# Patient Record
Sex: FEMALE | Race: WHITE | NOT HISPANIC OR LATINO | Employment: OTHER | URBAN - METROPOLITAN AREA
[De-identification: names, ages, dates, MRNs, and addresses within clinical notes are randomized per-mention and may not be internally consistent; named-entity substitution may affect disease eponyms.]

---

## 2017-04-04 ENCOUNTER — OFFICE VISIT (OUTPATIENT)
Dept: MEDICAL GROUP | Facility: PHYSICIAN GROUP | Age: 72
End: 2017-04-04
Payer: MEDICARE

## 2017-04-04 VITALS
TEMPERATURE: 97.7 F | DIASTOLIC BLOOD PRESSURE: 80 MMHG | OXYGEN SATURATION: 94 % | HEART RATE: 62 BPM | WEIGHT: 132 LBS | HEIGHT: 62 IN | RESPIRATION RATE: 18 BRPM | SYSTOLIC BLOOD PRESSURE: 140 MMHG | BODY MASS INDEX: 24.29 KG/M2

## 2017-04-04 DIAGNOSIS — E78.5 DYSLIPIDEMIA: ICD-10-CM

## 2017-04-04 DIAGNOSIS — F17.200 TOBACCO DEPENDENCE: ICD-10-CM

## 2017-04-04 DIAGNOSIS — R73.01 IMPAIRED FASTING BLOOD SUGAR: ICD-10-CM

## 2017-04-04 DIAGNOSIS — M81.0 OSTEOPOROSIS: ICD-10-CM

## 2017-04-04 DIAGNOSIS — E55.9 VITAMIN D DEFICIENCY: ICD-10-CM

## 2017-04-04 PROCEDURE — G8432 DEP SCR NOT DOC, RNG: HCPCS | Performed by: FAMILY MEDICINE

## 2017-04-04 PROCEDURE — 4004F PT TOBACCO SCREEN RCVD TLK: CPT | Performed by: FAMILY MEDICINE

## 2017-04-04 PROCEDURE — 4040F PNEUMOC VAC/ADMIN/RCVD: CPT | Performed by: FAMILY MEDICINE

## 2017-04-04 PROCEDURE — 99214 OFFICE O/P EST MOD 30 MIN: CPT | Performed by: FAMILY MEDICINE

## 2017-04-04 PROCEDURE — G8420 CALC BMI NORM PARAMETERS: HCPCS | Performed by: FAMILY MEDICINE

## 2017-04-04 PROCEDURE — 3014F SCREEN MAMMO DOC REV: CPT | Performed by: FAMILY MEDICINE

## 2017-04-04 PROCEDURE — 1101F PT FALLS ASSESS-DOCD LE1/YR: CPT | Performed by: FAMILY MEDICINE

## 2017-04-04 NOTE — PROGRESS NOTES
"Subjective:      Karla Prince is a 71 y.o. female who presents with Follow-Up            HPI     Patient returns for follow-up of her medical problems.    In terms of her dyslipidemia, she continues to manage this with her own efforts. The last blood work was in September 2016 with triglycerides of 360, HDL 50 and . She was supposed to have follow-up blood work before this visit but she was not able to get this done.    We also follow her for impaired fasting blood sugar and she manages this by watching her diet. The last blood work came back normal fasting blood sugar but her hemoglobin A1c was 5.8 in March 2016.    For her osteoporosis, she takes calcium and vitamin D supplementation only. She refused biphosphonates because of concern for side effects. Her last bone density scan was in 2016.    As mentioned above she takes vitamin D supplementation 2000 international units daily for osteoporosis as well as for vitamin D deficiency. Last vitamin D level was 27 in December 2016.    She continues to smoke about quarter of a pack per day.    Past medical history, past surgical history, family history reviewed-no changes    Social history reviewed-no changes    Allergies reviewed-no changes    Medications reviewed-no changes    ROS     Review of systems as per history of present illness, the rest are negative.       Objective:     /80 mmHg  Pulse 62  Temp(Src) 36.5 °C (97.7 °F)  Resp 18  Ht 1.575 m (5' 2.01\")  Wt 59.875 kg (132 lb)  BMI 24.14 kg/m2  SpO2 94%     Physical Exam     Examined alert, awake, oriented, not in distress    Neck-supple, no lymphadenopathy, no thyromegaly  Lungs-clear to auscultation, no rales, no wheezes  Heart-regular rate and rhythm, no murmur  Extremities-no edema, clubbing, cyanosis            Assessment/Plan:     1. Dyslipidemia  She will do follow-up blood work as previously ordered. She was made aware that smoking cigarettes is a risk factor for CAD. We will calculate " her 10 year cardiovascular disease after we get her lab results and consider statin depending on her risk. Was advised to stop cigarette use to decrease her cardiovascular disease risk.    2. Impaired fasting blood sugar  Advised to watch her diet in terms of carbohydrates and sweets, regular exercises. We will do blood work as previously ordered to follow-up on the blood sugar as well as hemoglobin A1c.    3. Osteoporosis  She continues to refuse biphosphonates. She will continue calcium and vitamin D supplementation. We will check vitamin D level.    4. Vitamin D deficiency  Continue vitamin D supplementation and we will check vitamin D level.    5. Tobacco dependence  Discussed the need to stop cigarette use because of increased risk for cardiovascular disease.     6. Need for Tdap vaccination  Tdap was given.  - TDAP VACCINE =>8YO IM      Please note that this dictation was created using voice recognition software. I have worked with consultants from the vendor as well as technical experts from [facility]  [employer] to optimize the interface. I have made every reasonable attempt to correct obvious errors, but I expect that there are errors of grammar and possibly content I did not discover before finalizing the note.

## 2017-04-04 NOTE — MR AVS SNAPSHOT
"        Karla Prince   2017 1:40 PM   Office Visit   MRN: [id number]    Department:  [facility]   Dept Phone:  [phone]    Description:  Female : 1945   Provider:  Yas Phelps M.D.           Reason for Visit     Follow-Up 6 month follow up       Allergies as of 2017     No Known Allergies      You were diagnosed with     Dyslipidemia   [871077]       Impaired fasting blood sugar   [637371]       Osteoporosis   [6177752]       Vitamin D deficiency   [8192690]       Tobacco dependence   [033428]       Need for Tdap vaccination   [542042]         Vital Signs     Blood Pressure Pulse Temperature Respirations Height Weight    140/80 mmHg 62 36.5 °C (97.7 °F) 18 1.575 m (5' 2.01\") 59.875 kg (132 lb)    Body Mass Index Oxygen Saturation Smoking Status             24.14 kg/m2 94% Current Every Day Smoker         Basic Information     Date Of Birth Sex Race Ethnicity Preferred Language    1945 Female White Non- English      Your appointments     Oct 10, 2017 10:00 AM   Established Patient with Yas Phelps M.D.   [facility] (--)    [address]  [address]  [facility] [id number] [phone]           You will be receiving a confirmation call a few days before your appointment from our automated call confirmation system.              Problem List              ICD-10-CM Priority Class Noted - Resolved    Osteoporosis M81.0   3/4/2016 - Present    Dyslipidemia E78.5   2016 - Present    Impaired fasting blood sugar R73.01   2016 - Present      Health Maintenance        Date Due Completion Dates    IMM DTaP/Tdap/Td Vaccine (1 - Tdap) 1966 ---    PAP SMEAR 1968 ---    COLONOSCOPY 1997 ---    IMM PNEUMOCOCCAL 65+ (ADULT) LOW/MEDIUM RISK SERIES (2 of 2 - PPSV23) 2017, 10/10/2011    MAMMOGRAM 3/3/2017 3/3/2016, 2016    BONE DENSITY 3/3/2021 3/3/2016            Current Immunizations     13-VALENT PCV PREVNAR 2016    Influenza " Vaccine Adult HD 10/3/2016, 10/12/2015    Pneumococcal polysaccharide vaccine (PPSV-23) 10/10/2011    SHINGLES VACCINE 10/12/2009    Tdap Vaccine  Incomplete      Below and/or attached are the medications your provider expects you to take. Review all of your home medications and newly ordered medications with your provider and/or pharmacist. Follow medication instructions as directed by your provider and/or pharmacist. Please keep your medication list with you and share with your provider. Update the information when medications are discontinued, doses are changed, or new medications (including over-the-counter products) are added; and carry medication information at all times in the event of emergency situations     Allergies:  No Known Allergies          Medications  Valid as of: April 04, 2017 -  2:03 PM    Generic Name Brand Name Tablet Size Instructions for use    Acetaminophen   Take  by mouth.        Alendronate Sodium (Tab) FOSAMAX 70 MG Take 1 Tab by mouth every 7 days.        Aspirin (Chew Tab) ASA 81 MG Take 81 mg by mouth every day.        Calcium Citrate   Take  by mouth.        Cholecalciferol (Tab) cholecalciferol 1000 UNIT Take 1,000 Units by mouth every day.        Clobetasol Propionate (Cream) TEMOVATE 0.05 % Apply  to affected area(s) 2 times a day.        Phenazopyridine HCl (Tab) PYRIDIUM 200 MG Take 1 Tab by mouth 3 times a day as needed.        .                 Medicines prescribed today were sent to:     [facility]  [place], [place] - [address]    [address] [facility] [address]    Phone: [phone] Fax: [phone]    Open 24 Hours?: No      Medication refill instructions:       If your prescription bottle indicates you have medication refills left, it is not necessary to call your provider’s office. Please contact your pharmacy and they will refill your medication.    If your prescription bottle indicates you do not have any refills left, you may request refills at any time  through one of the following ways: The online Lezu365 system (except Urgent Care), by calling your provider’s office, or by asking your pharmacy to contact your provider’s office with a refill request. Medication refills are processed only during regular business hours and may not be available until the next business day. Your provider may request additional information or to have a follow-up visit with you prior to refilling your medication.   *Please Note: Medication refills are assigned a new Rx number when refilled electronically. Your pharmacy may indicate that no refills were authorized even though a new prescription for the same medication is available at the pharmacy. Please request the medicine by name with the pharmacy before contacting your provider for a refill.           Lezu365 Access Code: [email or web address]  Expires: 5/4/2017  1:03 PM    Lezu365  A secure, online tool to manage your health information     [employer]’s Lezu365® is a secure, online tool that connects you to your personalized health information from the privacy of your home -- day or night - making it very easy for you to manage your healthcare. Once the activation process is completed, you can even access your medical information using the Lezu365 cherelle, which is available for free in the Apple Cherelle store or Google Play store.     Lezu365 provides the following levels of access (as shown below):   My Chart Features   [facility] Doctor [facility]  Urgent  Care Non-[facility]  Primary Care  Doctor   Email your healthcare team securely and privately 24/7 X X X    Manage appointments: schedule your next appointment; view details of past/upcoming appointments X      Request prescription refills. X      View recent personal medical records, including lab and immunizations X X X X   View health record, including health history, allergies, medications X X X X   Read reports about your outpatient visits, procedures, consult  and ER notes X X X X   See your discharge summary, which is a recap of your hospital and/or ER visit that includes your diagnosis, lab results, and care plan. X X       How to register for Onyu:  1. Go to  [email or web address].  2. Click on the Sign Up Now box, which takes you to the New Member Sign Up page. You will need to provide the following information:  a. Enter your Onyu Access Code exactly as it appears at the top of this page. (You will not need to use this code after you’ve completed the sign-up process. If you do not sign up before the expiration date, you must request a new code.)   b. Enter your date of birth.   c. Enter your home email address.   d. Click Submit, and follow the next screen’s instructions.  3. Create a Onyu ID. This will be your Onyu login ID and cannot be changed, so think of one that is secure and easy to remember.  4. Create a Onyu password. You can change your password at any time.  5. Enter your Password Reset Question and Answer. This can be used at a later time if you forget your password.   6. Enter your e-mail address. This allows you to receive e-mail notifications when new information is available in Onyu.  7. Click Sign Up. You can now view your health information.    For assistance activating your Onyu account, call [phone]        Quit Tobacco Information     Do you want to quit using tobacco?    Quitting tobacco decreases risks of cancer, heart and lung disease, increases life expectancy, improves sense of taste and smell, and increases spending money, among other benefits.    If you are thinking about quitting, we can help.  • [employer] Quit Tobacco Program: 982.355.2162  o Program occurs weekly for four weeks and includes pharmacist consultation on products to support quitting smoking or chewing tobacco. A provider referral is needed for pharmacist consultation.  • Tobacco Users Help Hotline: 4-058-QUIT-NOW (552-3256) or  https://[place].quitlogix.org/  o Free, confidential telephone and online coaching for [place] residents. Sessions are designed on a schedule that is convenient for you. Eligible clients receive free nicotine replacement therapy.  • Nationally: www.smokefree.gov  o Information and professional assistance to support both immediate and long-term needs as you become, and remain, a non-smoker. Smokefree.gov allows you to choose the help that best fits your needs.

## 2017-04-14 ENCOUNTER — HOSPITAL ENCOUNTER (OUTPATIENT)
Dept: LAB | Facility: MEDICAL CENTER | Age: 72
End: 2017-04-14
Attending: FAMILY MEDICINE
Payer: MEDICARE

## 2017-04-14 DIAGNOSIS — M81.0 OSTEOPOROSIS: ICD-10-CM

## 2017-04-14 DIAGNOSIS — R73.01 IMPAIRED FASTING BLOOD SUGAR: ICD-10-CM

## 2017-04-14 DIAGNOSIS — E78.5 DYSLIPIDEMIA: ICD-10-CM

## 2017-04-14 DIAGNOSIS — E55.9 VITAMIN D INSUFFICIENCY: ICD-10-CM

## 2017-04-14 DIAGNOSIS — R03.0 ELEVATED BLOOD PRESSURE READING: ICD-10-CM

## 2017-04-14 LAB
25(OH)D3 SERPL-MCNC: 23 NG/ML (ref 30–100)
ALBUMIN SERPL BCP-MCNC: 4 G/DL (ref 3.2–4.9)
ALBUMIN/GLOB SERPL: 1.5 G/DL
ALP SERPL-CCNC: 56 U/L (ref 30–99)
ALT SERPL-CCNC: 16 U/L (ref 2–50)
ANION GAP SERPL CALC-SCNC: 6 MMOL/L (ref 0–11.9)
AST SERPL-CCNC: 18 U/L (ref 12–45)
BASOPHILS # BLD AUTO: 0.8 % (ref 0–1.8)
BASOPHILS # BLD: 0.06 K/UL (ref 0–0.12)
BILIRUB SERPL-MCNC: 0.6 MG/DL (ref 0.1–1.5)
BUN SERPL-MCNC: 16 MG/DL (ref 8–22)
CALCIUM SERPL-MCNC: 9.1 MG/DL (ref 8.5–10.5)
CHLORIDE SERPL-SCNC: 107 MMOL/L (ref 96–112)
CHOLEST SERPL-MCNC: 202 MG/DL (ref 100–199)
CO2 SERPL-SCNC: 27 MMOL/L (ref 20–33)
CREAT SERPL-MCNC: 0.77 MG/DL (ref 0.5–1.4)
EOSINOPHIL # BLD AUTO: 0.11 K/UL (ref 0–0.51)
EOSINOPHIL NFR BLD: 1.5 % (ref 0–6.9)
ERYTHROCYTE [DISTWIDTH] IN BLOOD BY AUTOMATED COUNT: 44.1 FL (ref 35.9–50)
GFR SERPL CREATININE-BSD FRML MDRD: >60 ML/MIN/1.73 M 2
GLOBULIN SER CALC-MCNC: 2.7 G/DL (ref 1.9–3.5)
GLUCOSE SERPL-MCNC: 92 MG/DL (ref 65–99)
HCT VFR BLD AUTO: 43.9 % (ref 37–47)
HDLC SERPL-MCNC: 55 MG/DL
HGB BLD-MCNC: 14.1 G/DL (ref 12–16)
IMM GRANULOCYTES # BLD AUTO: 0.02 K/UL (ref 0–0.11)
IMM GRANULOCYTES NFR BLD AUTO: 0.3 % (ref 0–0.9)
LDLC SERPL CALC-MCNC: 95 MG/DL
LYMPHOCYTES # BLD AUTO: 2.1 K/UL (ref 1–4.8)
LYMPHOCYTES NFR BLD: 27.7 % (ref 22–41)
MCH RBC QN AUTO: 31.5 PG (ref 27–33)
MCHC RBC AUTO-ENTMCNC: 32.1 G/DL (ref 33.6–35)
MCV RBC AUTO: 98 FL (ref 81.4–97.8)
MONOCYTES # BLD AUTO: 0.52 K/UL (ref 0–0.85)
MONOCYTES NFR BLD AUTO: 6.9 % (ref 0–13.4)
NEUTROPHILS # BLD AUTO: 4.77 K/UL (ref 2–7.15)
NEUTROPHILS NFR BLD: 62.8 % (ref 44–72)
NRBC # BLD AUTO: 0 K/UL
NRBC BLD AUTO-RTO: 0 /100 WBC
PLATELET # BLD AUTO: 210 K/UL (ref 164–446)
PMV BLD AUTO: 12.1 FL (ref 9–12.9)
POTASSIUM SERPL-SCNC: 3.9 MMOL/L (ref 3.6–5.5)
PROT SERPL-MCNC: 6.7 G/DL (ref 6–8.2)
RBC # BLD AUTO: 4.48 M/UL (ref 4.2–5.4)
SODIUM SERPL-SCNC: 140 MMOL/L (ref 135–145)
TRIGL SERPL-MCNC: 258 MG/DL (ref 0–149)
WBC # BLD AUTO: 7.6 K/UL (ref 4.8–10.8)

## 2017-04-14 PROCEDURE — 82306 VITAMIN D 25 HYDROXY: CPT

## 2017-04-14 PROCEDURE — 80061 LIPID PANEL: CPT

## 2017-04-14 PROCEDURE — 85025 COMPLETE CBC W/AUTO DIFF WBC: CPT

## 2017-04-14 PROCEDURE — 80053 COMPREHEN METABOLIC PANEL: CPT

## 2017-04-14 PROCEDURE — 36415 COLL VENOUS BLD VENIPUNCTURE: CPT

## 2017-04-16 DIAGNOSIS — E55.9 VITAMIN D DEFICIENCY: ICD-10-CM

## 2017-04-16 DIAGNOSIS — R73.01 IMPAIRED FASTING BLOOD SUGAR: ICD-10-CM

## 2017-04-16 DIAGNOSIS — E78.5 DYSLIPIDEMIA: ICD-10-CM

## 2017-04-17 ENCOUNTER — TELEPHONE (OUTPATIENT)
Dept: MEDICAL GROUP | Facility: PHYSICIAN GROUP | Age: 72
End: 2017-04-17

## 2017-04-17 NOTE — TELEPHONE ENCOUNTER
1. Caller Name: Karla Prince                      Call Back Number: [phone] (home)     2. Message: Unable to reach pt left vm to call back.     3. Patient approves office to leave a detailed voicemail/MyChart message: N\A

## 2017-04-17 NOTE — TELEPHONE ENCOUNTER
----- Message from Yas Phelps M.D. sent at 4/16/2017  5:06 PM PDT -----  Cholesterol panel came back triglycerides went down from 360-258. We still need them below 150. Continue to watch the sweets and carbohydrates. Exercise regularly.  HDL or good cholesterol is still very good at 55. This needs to be 40 or higher.  LDL or bad cholesterol improved and decreased from 118-95. We need to keep this below 100. Continue to avoid fatty foods, eat more fruits and vegetables and fish.  No diabetes. Liver and kidney functions are normal.  Vitamin D decreased from 27-23. Normal range is . Add 1000 international units of vitamin D to current dose.  No anemia.  We will do follow-up few days before her visit in October. This is a fasting blood work. Orders are in the computer.

## 2017-04-18 NOTE — TELEPHONE ENCOUNTER
1. Caller Name: Karla Prince                      Call Back Number: [phone] (home)     2. Message: Pt notified of the results below.     3. Patient approves office to leave a detailed voicemail/MyChart message: N\A

## 2017-06-13 ENCOUNTER — OFFICE VISIT (OUTPATIENT)
Dept: URGENT CARE | Facility: PHYSICIAN GROUP | Age: 72
End: 2017-06-13
Payer: MEDICARE

## 2017-06-13 VITALS
HEIGHT: 62 IN | DIASTOLIC BLOOD PRESSURE: 72 MMHG | BODY MASS INDEX: 24.29 KG/M2 | TEMPERATURE: 98.3 F | WEIGHT: 132 LBS | OXYGEN SATURATION: 98 % | HEART RATE: 90 BPM | SYSTOLIC BLOOD PRESSURE: 128 MMHG

## 2017-06-13 DIAGNOSIS — H65.06 RECURRENT ACUTE SEROUS OTITIS MEDIA OF BOTH EARS: ICD-10-CM

## 2017-06-13 DIAGNOSIS — Z72.0 TOBACCO ABUSE: ICD-10-CM

## 2017-06-13 PROCEDURE — 99213 OFFICE O/P EST LOW 20 MIN: CPT | Performed by: EMERGENCY MEDICINE

## 2017-06-13 ASSESSMENT — ENCOUNTER SYMPTOMS
HEADACHES: 0
FEVER: 0
VERTIGO: 0
SEIZURES: 0
NERVOUS/ANXIOUS: 0
EYE REDNESS: 0
FOCAL WEAKNESS: 0
SORE THROAT: 0
NAUSEA: 1
INSOMNIA: 0
MEMORY LOSS: 0
COUGH: 0
CHILLS: 0
EYE DISCHARGE: 0
ABDOMINAL PAIN: 0
VOMITING: 0
NECK PAIN: 0

## 2017-06-13 NOTE — MR AVS SNAPSHOT
"        Karla Prince   2017 12:55 PM   Office Visit   MRN: [id number]    Department:  [facility]   Dept Phone:  [phone]    Description:  Female : 1945   Provider:  GALILEO Hodge M.D.           Reason for Visit     Ear Fullness bilat ear fullness x 5 days      Allergies as of 2017     No Known Allergies      You were diagnosed with     Tobacco abuse   [570393]       Recurrent acute serous otitis media of both ears   [768419]         Vital Signs     Blood Pressure Pulse Temperature Height Weight Body Mass Index    128/72 mmHg 90 36.8 °C (98.3 °F) 1.575 m (5' 2\") 59.875 kg (132 lb) 24.14 kg/m2    Oxygen Saturation Smoking Status                98% Current Every Day Smoker          Basic Information     Date Of Birth Sex Race Ethnicity Preferred Language    1945 Female White Non- English      Your appointments     Oct 10, 2017 10:00 AM   Established Patient with Yas Phelps M.D.   [facility] (--)    [address]  [address]  [facility] [id number] [phone]           You will be receiving a confirmation call a few days before your appointment from our automated call confirmation system.              Problem List              ICD-10-CM Priority Class Noted - Resolved    Osteoporosis M81.0   3/4/2016 - Present    Dyslipidemia E78.5   2016 - Present    Impaired fasting blood sugar R73.01   2016 - Present      Health Maintenance        Date Due Completion Dates    PAP SMEAR 1968 ---    COLONOSCOPY 1997 ---    IMM PNEUMOCOCCAL 65+ (ADULT) LOW/MEDIUM RISK SERIES (2 of 2 - PPSV23) 2017, 10/10/2011    MAMMOGRAM 3/3/2017 3/3/2016    BONE DENSITY 3/3/2021 3/3/2016    IMM DTaP/Tdap/Td Vaccine (2 - Td) 10/17/2026 10/17/2016            Current Immunizations     13-VALENT PCV PREVNAR 2016    Influenza Vaccine Adult HD 10/17/2016, 10/3/2016, 10/12/2015    Pneumococcal polysaccharide vaccine (PPSV-23) 10/10/2011    SHINGLES VACCINE " 10/12/2009    Tdap Vaccine 10/17/2016      Below and/or attached are the medications your provider expects you to take. Review all of your home medications and newly ordered medications with your provider and/or pharmacist. Follow medication instructions as directed by your provider and/or pharmacist. Please keep your medication list with you and share with your provider. Update the information when medications are discontinued, doses are changed, or new medications (including over-the-counter products) are added; and carry medication information at all times in the event of emergency situations     Allergies:  No Known Allergies          Medications  Valid as of: June 13, 2017 -  6:47 PM    Generic Name Brand Name Tablet Size Instructions for use    Acetaminophen   Take  by mouth.        Alendronate Sodium (Tab) FOSAMAX 70 MG Take 1 Tab by mouth every 7 days.        Aspirin (Chew Tab) ASA 81 MG Take 81 mg by mouth every day.        Calcium Citrate   Take  by mouth.        Cholecalciferol (Tab) cholecalciferol 1000 UNIT Take 1,000 Units by mouth every day.        Clobetasol Propionate (Cream) TEMOVATE 0.05 % Apply  to affected area(s) 2 times a day.        Phenazopyridine HCl (Tab) PYRIDIUM 200 MG Take 1 Tab by mouth 3 times a day as needed.        .                 Medicines prescribed today were sent to:     [facility] - [place], [place] - [address]    [facility] [address]    Phone: [phone] Fax: [phone]    Open 24 Hours?: No      Medication refill instructions:       If your prescription bottle indicates you have medication refills left, it is not necessary to call your provider’s office. Please contact your pharmacy and they will refill your medication.    If your prescription bottle indicates you do not have any refills left, you may request refills at any time through one of the following ways: The online Orecon system (except Urgent Care), by calling your provider’s office, or by  asking your pharmacy to contact your provider’s office with a refill request. Medication refills are processed only during regular business hours and may not be available until the next business day. Your provider may request additional information or to have a follow-up visit with you prior to refilling your medication.   *Please Note: Medication refills are assigned a new Rx number when refilled electronically. Your pharmacy may indicate that no refills were authorized even though a new prescription for the same medication is available at the pharmacy. Please request the medicine by name with the pharmacy before contacting your provider for a refill.           BestBoy Keyboard Access Code: [email or web address]  Expires: 7/13/2017  6:47 PM    BestBoy Keyboard  A secure, online tool to manage your health information     [employer]’s BestBoy Keyboard® is a secure, online tool that connects you to your personalized health information from the privacy of your home -- day or night - making it very easy for you to manage your healthcare. Once the activation process is completed, you can even access your medical information using the BestBoy Keyboard cherelle, which is available for free in the Apple Cherelle store or Google Play store.     BestBoy Keyboard provides the following levels of access (as shown below):   My Chart Features   [facility] Doctor [facility]  Urgent  Care Non-[facility]  Primary Care  Doctor   Email your healthcare team securely and privately 24/7 X X X    Manage appointments: schedule your next appointment; view details of past/upcoming appointments X      Request prescription refills. X      View recent personal medical records, including lab and immunizations X X X X   View health record, including health history, allergies, medications X X X X   Read reports about your outpatient visits, procedures, consult and ER notes X X X X   See your discharge summary, which is a recap of your hospital and/or ER visit that includes your  diagnosis, lab results, and care plan. X X       How to register for Bunndle:  1. Go to  [email or web address].  2. Click on the Sign Up Now box, which takes you to the New Member Sign Up page. You will need to provide the following information:  a. Enter your Bunndle Access Code exactly as it appears at the top of this page. (You will not need to use this code after you’ve completed the sign-up process. If you do not sign up before the expiration date, you must request a new code.)   b. Enter your date of birth.   c. Enter your home email address.   d. Click Submit, and follow the next screen’s instructions.  3. Create a Bunndle ID. This will be your Bunndle login ID and cannot be changed, so think of one that is secure and easy to remember.  4. Create a FarmLinkt password. You can change your password at any time.  5. Enter your Password Reset Question and Answer. This can be used at a later time if you forget your password.   6. Enter your e-mail address. This allows you to receive e-mail notifications when new information is available in Bunndle.  7. Click Sign Up. You can now view your health information.    For assistance activating your Bunndle account, call [phone]        Quit Tobacco Information     Do you want to quit using tobacco?    Quitting tobacco decreases risks of cancer, heart and lung disease, increases life expectancy, improves sense of taste and smell, and increases spending money, among other benefits.    If you are thinking about quitting, we can help.  • [facility] [place] [facility] Quit Tobacco Program: 243.508.1915  o Program occurs weekly for four weeks and includes pharmacist consultation on products to support quitting smoking or chewing tobacco. A provider referral is needed for pharmacist consultation.  • Tobacco Users Help Hotline: 6-509-QUIT-NOW (069-5500) or https://[place].quitlogix.org/  o Free, confidential telephone and online coaching for [place] residents. Sessions are designed on a  schedule that is convenient for you. Eligible clients receive free nicotine replacement therapy.  • Nationally: www.smokefree.gov  o Information and professional assistance to support both immediate and long-term needs as you become, and remain, a non-smoker. Smokefree.gov allows you to choose the help that best fits your needs.

## 2017-06-13 NOTE — PROGRESS NOTES
Subjective:      Karla Prince is a 71 y.o. female who presents with Ear Fullness            Ear Fullness  This is a new problem. The current episode started in the past 7 days. The problem occurs constantly. The problem has been waxing and waning. Associated symptoms include congestion and nausea. Pertinent negatives include no abdominal pain, chest pain, chills, coughing, fever, headaches (headaches have resolved were present for 1-2 days.), neck pain, rash, sore throat, vertigo or vomiting.   No Known Allergies   Social History     Social History   • Marital Status:      Spouse Name: N/A   • Number of Children: 1   • Years of Education: N/A     Occupational History   • retired - front office neurosurgeon's office      Social History Main Topics   • Smoking status: Current Every Day Smoker -- 0.25 packs/day for 52 years     Types: Cigarettes   • Smokeless tobacco: Never Used      Comment: dec from 1/2 ppd to 1/4 ppd since 2/16   • Alcohol Use: 0.0 oz/week     0 Standard drinks or equivalent per week      Comment: glass of wine with dinner   • Drug Use: No   • Sexual Activity:     Partners: Male     Other Topics Concern   • Not on file     Social History Narrative     Past Medical History   Diagnosis Date   • Osteoporosis 3/16     DEXA scan-3/16   • Osteoporosis    • Dyslipidemia    • Impaired fasting blood sugar      Current Outpatient Prescriptions on File Prior to Visit   Medication Sig Dispense Refill   • vitamin D (CHOLECALCIFEROL) 1000 UNIT Tab Take 1,000 Units by mouth every day.     • alendronate (FOSAMAX) 70 MG Tab Take 1 Tab by mouth every 7 days. 12 Tab 3   • clobetasol (TEMOVATE) 0.05 % Cream Apply  to affected area(s) 2 times a day.     • phenazopyridine (PYRIDIUM) 200 MG TABS Take 1 Tab by mouth 3 times a day as needed. 6 Tab 0   • Calcium Citrate (CITRACAL PO) Take  by mouth.     • aspirin (ASA) 81 MG CHEW chewable tablet Take 81 mg by mouth every day.     • Acetaminophen (TYLENOL PO) Take  by  "mouth.       No current facility-administered medications on file prior to visit.     Family History   Problem Relation Age of Onset   • Dementia Mother    • Stroke Mother    • Diabetes Father    • Hyperlipidemia Sister    • Hyperlipidemia Brother        Review of Systems   Constitutional: Negative for fever and chills.   HENT: Positive for congestion and ear pain. Negative for ear discharge and sore throat.    Eyes: Negative for discharge and redness.   Respiratory: Negative for cough.    Cardiovascular: Negative for chest pain.   Gastrointestinal: Positive for nausea. Negative for vomiting and abdominal pain.   Musculoskeletal: Negative for neck pain.   Skin: Negative for rash.   Neurological: Negative for vertigo, focal weakness, seizures and headaches (headaches have resolved were present for 1-2 days.).   Psychiatric/Behavioral: Negative for memory loss. The patient is not nervous/anxious and does not have insomnia.           Objective:     /72 mmHg  Pulse 90  Temp(Src) 36.8 °C (98.3 °F)  Ht 1.575 m (5' 2\")  Wt 59.875 kg (132 lb)  BMI 24.14 kg/m2  SpO2 98%     Physical Exam   Constitutional: She appears well-developed and well-nourished. No distress.   HENT:   Head: Normocephalic and atraumatic.   Right Ear: External ear normal.   Left Ear: External ear normal.   TMs with clear fluid behind her TMs, no erythema no dullness canals clear. Patient's pharynx is benign without any exudates.   Eyes: Conjunctivae are normal. Right eye exhibits no discharge. Left eye exhibits no discharge.   Neck: Normal range of motion.   Cardiovascular: Normal rate and regular rhythm.    Pulmonary/Chest: Effort normal and breath sounds normal. No stridor.   Abdominal: She exhibits no distension. There is no tenderness.   Musculoskeletal: Normal range of motion.   Neurological: She is alert.   Skin: Skin is warm and dry. She is not diaphoretic. No erythema.   Psychiatric: She has a normal mood and affect.   Vitals " reviewed.              Assessment/Plan:     DX Otalgia (Serous Otitis)          Tobacco abuse      I am recommending the patient initiate/ continue hydration efforts including the use of a vaporizer/humidifier/ netti pot. I also recommend the pt, initiate Mucinex D, Sudafed during the day. If the patient's condition exacerbates with worsening dysphagia, shortness of breath, uncontrolled fever, headache or chest pressure he/she will return immediately to the urgent care or go to  the emergency department for further evaluation.    I recommended the patient cut back on her smoking    GALILEO Hodge

## 2017-07-19 ENCOUNTER — TELEPHONE (OUTPATIENT)
Dept: MEDICAL GROUP | Facility: PHYSICIAN GROUP | Age: 72
End: 2017-07-19

## 2017-07-19 DIAGNOSIS — H65.93 MIDDLE EAR EFFUSION, BILATERAL: ICD-10-CM

## 2017-07-19 NOTE — TELEPHONE ENCOUNTER
Patient called stating she went to  for plugged ears and they suggested Sudafed however this has not been effective.  She is requesting an ENT referral.

## 2017-08-14 ENCOUNTER — HOSPITAL ENCOUNTER (EMERGENCY)
Facility: MEDICAL CENTER | Age: 72
End: 2017-08-14
Attending: EMERGENCY MEDICINE
Payer: MEDICARE

## 2017-08-14 ENCOUNTER — APPOINTMENT (OUTPATIENT)
Dept: RADIOLOGY | Facility: MEDICAL CENTER | Age: 72
End: 2017-08-14
Attending: EMERGENCY MEDICINE
Payer: MEDICARE

## 2017-08-14 VITALS
RESPIRATION RATE: 18 BRPM | TEMPERATURE: 97.9 F | OXYGEN SATURATION: 92 % | WEIGHT: 133.6 LBS | SYSTOLIC BLOOD PRESSURE: 175 MMHG | BODY MASS INDEX: 24.59 KG/M2 | HEIGHT: 62 IN | HEART RATE: 63 BPM | DIASTOLIC BLOOD PRESSURE: 75 MMHG

## 2017-08-14 DIAGNOSIS — M62.830 SPASM OF LUMBAR PARASPINOUS MUSCLE: ICD-10-CM

## 2017-08-14 PROCEDURE — 99283 EMERGENCY DEPT VISIT LOW MDM: CPT

## 2017-08-14 PROCEDURE — 74176 CT ABD & PELVIS W/O CONTRAST: CPT

## 2017-08-14 RX ORDER — CYCLOBENZAPRINE HCL 10 MG
10 TABLET ORAL 3 TIMES DAILY PRN
Qty: 20 TAB | Refills: 0 | Status: SHIPPED | OUTPATIENT
Start: 2017-08-14 | End: 2018-04-12

## 2017-08-14 RX ORDER — IBUPROFEN 600 MG/1
600 TABLET ORAL EVERY 6 HOURS PRN
Qty: 30 TAB | Refills: 0 | Status: SHIPPED | OUTPATIENT
Start: 2017-08-14 | End: 2018-06-13

## 2017-08-14 RX ORDER — CYCLOBENZAPRINE HCL 10 MG
10 TABLET ORAL 3 TIMES DAILY PRN
Qty: 20 TAB | Refills: 0 | Status: SHIPPED | OUTPATIENT
Start: 2017-08-14 | End: 2017-08-14

## 2017-08-14 ASSESSMENT — PAIN SCALES - GENERAL: PAINLEVEL_OUTOF10: 3

## 2017-08-14 NOTE — ED NOTES
"Per er tech, pt amb to er with family with c/o left sided back pain \"few weeks \" with radiation to left side and buttock. Denies dysuria of difficulty with urination, states fell in \"snow hole\" in April and now family states \"bulge\" to area of pain.  "

## 2017-08-14 NOTE — ED NOTES
"Chief Complaint   Patient presents with   • Back Pain     Pt BIB family, c/o left sided back pain that radiate down to lower back, buttock region. Pt states that family noticed \"a buldge\" where back pain is. Pt denies dsyuria and trauma.      /80 mmHg  Pulse 85  Temp(Src) 36.6 °C (97.9 °F)  Resp 18  Ht 1.575 m (5' 2\")  Wt 60.6 kg (133 lb 9.6 oz)  BMI 24.43 kg/m2  SpO2 92%    "

## 2017-08-14 NOTE — ED AVS SNAPSHOT
8/14/2017    Karla Prince  [address] [address]  [facility] [address]    Dear Karla:    [facility] wants to ensure your discharge home is safe and you or your loved ones have had all of your questions answered regarding your care after you leave the hospital.    Below is a list of resources and contact information should you have any questions regarding your hospital stay, follow-up instructions, or active medical symptoms.    Questions or Concerns Regarding… Contact   Medical Questions Related to Your Discharge  (7 days a week, 8am-5pm) Contact a Nurse Care Coordinator   [phone]   Medical Questions Not Related to Your Discharge  (24 hours a day / 7 days a week)  Contact the Nurse Health Line   [phone]    Medications or Discharge Instructions Refer to your discharge packet   or contact your [facility] Primary Care Provider   [phone]   Follow-up Appointment(s) Schedule your appointment via [employer]   or contact Scheduling [phone]   Billing Review your statement via [employer]  or contact Billing [phone]   Medical Records Review your records via [employer]   or contact Medical Records [phone]     You may receive a telephone call within two days of discharge. This call is to make certain you understand your discharge instructions and have the opportunity to have any questions answered. You can also easily access your medical information, test results and upcoming appointments via the [employer] free online health management tool. You can learn more and sign up at [employer]. For assistance setting up your [employer] account, please call [phone].    Once again, we want to ensure your discharge home is safe and that you have a clear understanding of any next steps in your care. If you have any questions or concerns, please do not hesitate to contact us, we are here for you. Thank you for choosing [facility] for your healthcare needs.    Sincerely,    Your [facility] Healthcare Team

## 2017-08-14 NOTE — ED AVS SNAPSHOT
Home Care Instructions                                                                                                                Karla Prince   MRN: [id number]    Department:  [facility], Emergency Dept   Date of Visit:  8/14/2017            [facility], Emergency Dept    [address]    [place] [address]    Phone:  [phone]      You were seen by     Stefan Concepcion M.D.      Your Diagnosis Was     Spasm of lumbar paraspinous muscle     M62.830       Follow-up Information     1. Follow up with Yas Phelps M.D. In 1 day.    Specialty:  Family Medicine    Contact information    [address]  [place] [address] [phone]        Medication Information     Review all of your home medications and newly ordered medications with your primary doctor and/or pharmacist as soon as possible. Follow medication instructions as directed by your doctor and/or pharmacist.     Please keep your complete medication list with you and share with your physician. Update the information when medications are discontinued, doses are changed, or new medications (including over-the-counter products) are added; and carry medication information at all times in the event of emergency situations.               Medication List      START taking these medications        Instructions    Morning Afternoon Evening Bedtime    cyclobenzaprine 10 MG Tabs   Commonly known as:  FLEXERIL        Take 1 Tab by mouth 3 times a day as needed.   Dose:  10 mg                        ibuprofen 600 MG Tabs   Commonly known as:  MOTRIN        Take 1 Tab by mouth every 6 hours as needed.   Dose:  600 mg                          ASK your doctor about these medications        Instructions    Morning Afternoon Evening Bedtime    alendronate 70 MG Tabs   Commonly known as:  FOSAMAX        Take 1 Tab by mouth every 7 days.   Dose:  70 mg                        aspirin 81 MG Chew chewable tablet      Commonly known as:  ASA        Take 81 mg by mouth every day.   Dose:  81 mg                        CITRACAL PO        Take  by mouth.                        clobetasol 0.05 % Crea   Commonly known as:  TEMOVATE        Apply  to affected area(s) 2 times a day.                        phenazopyridine 200 MG Tabs   Commonly known as:  PYRIDIUM        Take 1 Tab by mouth 3 times a day as needed.   Dose:  200 mg                        TYLENOL PO        Take  by mouth.                        vitamin D 1000 UNIT Tabs   Commonly known as:  cholecalciferol        Take 1,000 Units by mouth every day.   Dose:  1000 Units                             Where to Get Your Medications      These medications were sent to [facility] - [place], [place] - [address] [facility] [address] [facility]  [address] [place] [address]     Phone:  [phone]    - cyclobenzaprine 10 MG Tabs  - ibuprofen 600 MG Tabs            Procedures and tests performed during your visit     CT-RENAL COLIC EVALUATION(A/P W/O)        Discharge Instructions       Return immediately to the Emergency Department if you experience continuing or worsening discomfort in your back, any numbness/weakness/tingling, abdominal pain, fever, chest pain, difficulty breathing or any other new or worsening symptoms.      Muscle Cramps and Spasms  Muscle cramps and spasms occur when a muscle or muscles tighten and you have no control over this tightening (involuntary muscle contraction). They are a common problem and can develop in any muscle. The most common place is in the calf muscles of the leg. Both muscle cramps and muscle spasms are involuntary muscle contractions, but they also have differences:   · Muscle cramps are sporadic and painful. They may last a few seconds to a quarter of an hour. Muscle cramps are often more forceful and last longer than muscle spasms.  · Muscle spasms may or may not be painful. They may also last just a few seconds or much longer.  CAUSES   It is uncommon for  cramps or spasms to be due to a serious underlying problem. In many cases, the cause of cramps or spasms is unknown. Some common causes are:   · Overexertion.    · Overuse from repetitive motions (doing the same thing over and over).    · Remaining in a certain position for a long period of time.    · Improper preparation, form, or technique while performing a sport or activity.    · Dehydration.    · Injury.    · Side effects of some medicines.    · Abnormally low levels of the salts and ions in your blood (electrolytes), especially potassium and calcium. This could happen if you are taking water pills (diuretics) or you are pregnant.    Some underlying medical problems can make it more likely to develop cramps or spasms. These include, but are not limited to:   · Diabetes.    · Parkinson disease.    · Hormone disorders, such as thyroid problems.    · Alcohol abuse.    · Diseases specific to muscles, joints, and bones.    · Blood vessel disease where not enough blood is getting to the muscles.    HOME CARE INSTRUCTIONS   · Stay well hydrated. Drink enough water and fluids to keep your urine clear or pale yellow.  · It may be helpful to massage, stretch, and relax the affected muscle.  · For tight or tense muscles, use a warm towel, heating pad, or hot shower water directed to the affected area.  · If you are sore or have pain after a cramp or spasm, applying ice to the affected area may relieve discomfort.  ¨ Put ice in a plastic bag.  ¨ Place a towel between your skin and the bag.  ¨ Leave the ice on for 15-20 minutes, 03-04 times a day.  · Medicines used to treat a known cause of cramps or spasms may help reduce their frequency or severity. Only take over-the-counter or prescription medicines as directed by your caregiver.  SEEK MEDICAL CARE IF:   Your cramps or spasms get more severe, more frequent, or do not improve over time.   MAKE SURE YOU:   · Understand these instructions.  · Will watch your  condition.  · Will get help right away if you are not doing well or get worse.     This information is not intended to replace advice given to you by your health care provider. Make sure you discuss any questions you have with your health care provider.     Document Released: 06/09/2003 Document Revised: 04/14/2014 Document Reviewed: 12/04/2013  [employer] Interactive Patient Education ©2016 [employer]            Patient Information     Patient Information    Following emergency treatment: all patient requiring follow-up care must return either to a private physician or a clinic if your condition worsens before you are able to obtain further medical attention, please return to the emergency room.     Billing Information    At [facility], we work to make the billing process streamlined for our patients.  Our Representatives are here to answer any questions you may have regarding your hospital bill.  If you have insurance coverage and have supplied your insurance information to us, we will submit a claim to your insurer on your behalf.  Should you have any questions regarding your bill, we can be reached online or by phone as follows:  Online: You are able pay your bills online or live chat with our representatives about any billing questions you may have. We are here to help Monday - Friday from 8:00am to 7:30pm and 9:00am - 12:00pm on Saturdays.  Please visit [email or web address]  for more information.   Phone:  [phone] or [phone]    Please note that your emergency physician, surgeon, pathologist, radiologist, anesthesiologist, and other specialists are not employed by [facility] and will therefore bill separately for their services.  Please contact them directly for any questions concerning their bills at the numbers below:     Emergency Physician Services:  [phone]  [facility]:  [phone]  [facility]:  [phone]  [facility]:  [phone]    1. Your final bill may vary from the amount quoted upon discharge if all procedures are not complete at that time, or if your doctor has additional procedures of which we are not aware. You will receive an additional bill if you return to the Emergency Department at [facility] for suture removal regardless of the facility of which the sutures were placed.     2. Please arrange for settlement of this account at the emergency registration.    3. All self-pay accounts are due in full at the time of treatment.  If you are unable to meet this obligation then payment is expected within 4-5 days.     4. If you have had radiology studies (CT, X-ray, Ultrasound, MRI), you have received a preliminary result during your emergency department visit. Please contact the radiology department [phone] to receive a copy of your final result. Please discuss the Final result with your primary physician or with the follow up physician provided.     Crisis Hotline:  [place] Crisis Hotline:  8-746-BTBAZMJ or 1-370.571.8622  [place] Crisis Hotline:    1-968.452.7645 or 166-413-5163         ED Discharge Follow Up Questions    1. In order to provide you with very good care, we would like to follow up with a phone call in the next few days.  May we have your permission to contact you?     YES /  NO    2. What is the best phone number to call you? (       )_____-__________    3. What is the best time to call you?      Morning  /  Afternoon  /  Evening                   Patient Signature:  ____________________________________________________________    Date:  ____________________________________________________________      Your appointments     Oct 10, 2017 10:00 AM   Established Patient with Yas Phelps M.D.   [facility] (--)    [address]  [address]  [place] [address]   [phone]           You will be receiving a confirmation call a few days before your appointment  from our automated call confirmation system.

## 2017-08-14 NOTE — ED PROVIDER NOTES
"ED Provider Note    CHIEF COMPLAINT  Chief Complaint   Patient presents with   • Back Pain     Pt BIB family, c/o left sided back pain that radiate down to lower back, buttock region. Pt states that family noticed \"a buldge\" where back pain is. Pt denies dsyuria and trauma.        HPI  Karla Prince is a 72 y.o. female who presents for evaluation of left-sided low back pain that has been present for a couple weeks, she describes intermittent sharp pains with some radiation toward the lower back. No weakness or numbness or tingling. No dysuria hematuria, no recent trauma. She did have an injury about 4 months ago but had no complaints at that time. No abdominal pain, no chest pain, no upper back pain. No other complaints.    REVIEW OF SYSTEMS  Negative for fever, rash, chest pain, dyspnea, abdominal pain, weakness, numbness.    PAST MEDICAL HISTORY   has a past medical history of Osteoporosis (3/16); Osteoporosis; Dyslipidemia; and Impaired fasting blood sugar.    SOCIAL HISTORY  Social History     Social History Main Topics   • Smoking status: Current Every Day Smoker -- 0.25 packs/day for 52 years     Types: Cigarettes   • Smokeless tobacco: Never Used      Comment: dec from 1/2 ppd to 1/4 ppd since 2/16   • Alcohol Use: 0.0 oz/week     0 Standard drinks or equivalent per week      Comment: glass of wine with dinner   • Drug Use: No   • Sexual Activity:     Partners: Male       SURGICAL HISTORY   has past surgical history that includes hysterectomy, total abdominal.    CURRENT MEDICATIONS  I personally reviewed the medication list in the charting documentation.     ALLERGIES  No Known Allergies    PHYSICAL EXAM  VITAL SIGNS: /80 mmHg  Pulse 85  Temp(Src) 36.6 °C (97.9 °F)  Resp 18  Ht 1.575 m (5' 2\")  Wt 60.6 kg (133 lb 9.6 oz)  BMI 24.43 kg/m2  SpO2 92%  Constitutional: Alert in no apparent distress.  HENT: No signs of trauma.   Eyes: Conjunctiva normal, Non-icteric.   Chest: Normal nonlabored " respirations  Skin: No erythema, No rash.   Musculoskeletal: Examination of the left paraspinal musculature reveals a firm palpable deformity with minimal tenderness, no midline tenderness, no obvious midline deformities.  Neurologic: Alert, No focal deficits noted.   Psychiatric: Affect normal, Judgment normal.    DIAGNOSTIC STUDIES / PROCEDURES    RADIOLOGY  CT-RENAL COLIC EVALUATION(A/P W/O)   Final Result      1.  Negative for renal or ureteral calculi. No left flank mass or hernia      2.  Prior hysterectomy      3.  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 (See chart for details)    Encounter Summary: This is a 72 y.o. female with basically atraumatic low back pain and no focal neurologic complaints or findings on exam, although on exam she does have abnormality of her paraspinal musculature which might very well represent extreme spasm although clinically it seems unlikely. No midline findings. We'll obtain a CT scan of her abdomen and pelvis without IV contrast to further characterize this region and then reevaluate ----- CT scan does not reveal any obvious etiologies of the patient's findings, do at this point suspect it is a muscle spasm, Flexeril will be prescribed as well as ibuprofen, I recommended heat as well as follow-up with the primary care physician for discussion about PT and massage. Strict return instructions discussed      DISPOSITION: Discharge Home      FINAL IMPRESSION  1. Spasm of lumbar paraspinous muscle        This dictation was created using voice recognition software. The accuracy of the dictation is limited to the abilities of the software. I expect there may be some errors of grammar and possibly content. The nursing notes were reviewed and certain aspects of this information were incorporated into this note.    Electronically signed by: Stefna Concepcion, 8/14/2017 2:11  PM

## 2017-08-14 NOTE — ED AVS SNAPSHOT
Prevently Access Code: [email or web address]  Expires: 9/13/2017  3:39 PM    Prevently  A secure, online tool to manage your health information     [employer]’s Prevently® is a secure, online tool that connects you to your personalized health information from the privacy of your home -- day or night - making it very easy for you to manage your healthcare. Once the activation process is completed, you can even access your medical information using the Prevently cherelle, which is available for free in the Apple Cherelle store or Google Play store.     Prevently provides the following levels of access (as shown below):   My Chart Features   [facility] Primary Care Doctor [facility]  Specialists [facility]  Urgent  Care Non-[facility]  Primary Care  Doctor   Email your healthcare team securely and privately 24/7 X X X X   Manage appointments: schedule your next appointment; view details of past/upcoming appointments X      Request prescription refills. X      View recent personal medical records, including lab and immunizations X X X X   View health record, including health history, allergies, medications X X X X   Read reports about your outpatient visits, procedures, consult and ER notes X X X X   See your discharge summary, which is a recap of your hospital and/or ER visit that includes your diagnosis, lab results, and care plan. X X       How to register for Prevently:  1. Go to  [email or web address].  2. Click on the Sign Up Now box, which takes you to the New Member Sign Up page. You will need to provide the following information:  a. Enter your Prevently Access Code exactly as it appears at the top of this page. (You will not need to use this code after you’ve completed the sign-up process. If you do not sign up before the expiration date, you must request a new code.)   b. Enter your date of birth.   c. Enter your home email address.   d. Click Submit, and follow the next screen’s instructions.  3. Create a Prevently ID. This will be your Prevently  login ID and cannot be changed, so think of one that is secure and easy to remember.  4. Create a [employer] password. You can change your password at any time.  5. Enter your Password Reset Question and Answer. This can be used at a later time if you forget your password.   6. Enter your e-mail address. This allows you to receive e-mail notifications when new information is available in [employer].  7. Click Sign Up. You can now view your health information.    For assistance activating your [employer] account, call [phone]

## 2017-08-14 NOTE — DISCHARGE INSTRUCTIONS
Return immediately to the Emergency Department if you experience continuing or worsening discomfort in your back, any numbness/weakness/tingling, abdominal pain, fever, chest pain, difficulty breathing or any other new or worsening symptoms.      Muscle Cramps and Spasms  Muscle cramps and spasms occur when a muscle or muscles tighten and you have no control over this tightening (involuntary muscle contraction). They are a common problem and can develop in any muscle. The most common place is in the calf muscles of the leg. Both muscle cramps and muscle spasms are involuntary muscle contractions, but they also have differences:   · Muscle cramps are sporadic and painful. They may last a few seconds to a quarter of an hour. Muscle cramps are often more forceful and last longer than muscle spasms.  · Muscle spasms may or may not be painful. They may also last just a few seconds or much longer.  CAUSES   It is uncommon for cramps or spasms to be due to a serious underlying problem. In many cases, the cause of cramps or spasms is unknown. Some common causes are:   · Overexertion.    · Overuse from repetitive motions (doing the same thing over and over).    · Remaining in a certain position for a long period of time.    · Improper preparation, form, or technique while performing a sport or activity.    · Dehydration.    · Injury.    · Side effects of some medicines.    · Abnormally low levels of the salts and ions in your blood (electrolytes), especially potassium and calcium. This could happen if you are taking water pills (diuretics) or you are pregnant.    Some underlying medical problems can make it more likely to develop cramps or spasms. These include, but are not limited to:   · Diabetes.    · Parkinson disease.    · Hormone disorders, such as thyroid problems.    · Alcohol abuse.    · Diseases specific to muscles, joints, and bones.    · Blood vessel disease where not enough blood is getting to the muscles.     HOME CARE INSTRUCTIONS   · Stay well hydrated. Drink enough water and fluids to keep your urine clear or pale yellow.  · It may be helpful to massage, stretch, and relax the affected muscle.  · For tight or tense muscles, use a warm towel, heating pad, or hot shower water directed to the affected area.  · If you are sore or have pain after a cramp or spasm, applying ice to the affected area may relieve discomfort.  ¨ Put ice in a plastic bag.  ¨ Place a towel between your skin and the bag.  ¨ Leave the ice on for 15-20 minutes, 03-04 times a day.  · Medicines used to treat a known cause of cramps or spasms may help reduce their frequency or severity. Only take over-the-counter or prescription medicines as directed by your caregiver.  SEEK MEDICAL CARE IF:   Your cramps or spasms get more severe, more frequent, or do not improve over time.   MAKE SURE YOU:   · Understand these instructions.  · Will watch your condition.  · Will get help right away if you are not doing well or get worse.     This information is not intended to replace advice given to you by your health care provider. Make sure you discuss any questions you have with your health care provider.     Document Released: 06/09/2003 Document Revised: 04/14/2014 Document Reviewed: 12/04/2013  [employer] Interactive Patient Education ©2016 [employer]

## 2017-08-17 ENCOUNTER — TELEPHONE (OUTPATIENT)
Dept: HEALTH INFORMATION MANAGEMENT | Facility: OTHER | Age: 72
End: 2017-08-17

## 2017-08-17 ENCOUNTER — PATIENT OUTREACH (OUTPATIENT)
Dept: HEALTH INFORMATION MANAGEMENT | Facility: OTHER | Age: 72
End: 2017-08-17

## 2017-08-17 NOTE — TELEPHONE ENCOUNTER
Patient is over the recommended age and is showing overdue for PAP SMEAR in Health Maintenance. Pt stated she no longer gets pap smears    Please reply to this message as to whether these tests are appropriate and I will update the Health Maintenance Topic or contact the patient to schedule.

## 2017-08-17 NOTE — PROGRESS NOTES
Attempt #:1    WebIZ Checked & Epic Updated: yes  HealthConnect Verified: no  Verify PCP: yes    Communication Preference Obtained: yes     Review Care Team: yes    Annual Wellness Visit Scheduling  1. Scheduling Status:Scheduled        Care Gap Scheduling (Attempt to Schedule EACH Overdue Care Gap!)     Health Maintenance Due   Topic Date Due   • Annual Wellness Visit  SCHEDULED   • PAP SMEAR  65+   • COLONOSCOPY  WILL DISCUSS WITH PCP   • MAMMOGRAM  SCHEDULED         MyChart Activation: declined  MyChart Cherelle: no  Virtual Visits: no  Opt In to Text Messages: no

## 2017-10-09 ENCOUNTER — TELEPHONE (OUTPATIENT)
Dept: MEDICAL GROUP | Facility: PHYSICIAN GROUP | Age: 72
End: 2017-10-09

## 2017-10-09 NOTE — TELEPHONE ENCOUNTER
ESTABLISHED PATIENT PRE-VISIT PLANNING     Note: Patient will not be contacted if there is no indication to call.     1.  Reviewed notes from the last few office visits within the medical group: Yes    2.  If any orders were placed at last visit or intended to be done for this visit (i.e. 6 mos follow-up), do we have Results/Consult Notes?        •  Labs - Labs were not ordered at last office visit.   Note: If patient appointment is for lab review and patient did not complete labs,                check with provider if OK to reschedule patient until labs completed.       •  Imaging - Imaging was not ordered at last office visit.       •  Referrals - No referrals were ordered at last office visit.    3. Is this appointment scheduled as a Hospital Follow-Up? No    4.  Immunizations were updated in [employer] using WebIZ?: Yes       •  Web Iz Recommendations: FLU, PNEUMOVAX (PPSV23) and TD    5.  Patient is due for the following Health Maintenance Topics:   Health Maintenance Due   Topic Date Due   • Annual Wellness Visit  1945   • COLONOSCOPY  08/14/1997   • IMM PNEUMOCOCCAL 65+ (ADULT) LOW/MEDIUM RISK SERIES (2 of 2 - PPSV23) 02/23/2017   • MAMMOGRAM  03/03/2017   • IMM INFLUENZA (1) 09/01/2017       - Patient has completed FLU, PNEUMOVAX (PPSV23) and TD Immunization(s) per WebIZ. Chart has been updated.      6.  Patient was NOT informed to arrive 15 min prior to their scheduled appointment and bring in their medication bottles.

## 2017-10-10 ENCOUNTER — OFFICE VISIT (OUTPATIENT)
Dept: MEDICAL GROUP | Facility: PHYSICIAN GROUP | Age: 72
End: 2017-10-10
Payer: MEDICARE

## 2017-10-10 VITALS
OXYGEN SATURATION: 97 % | TEMPERATURE: 98.2 F | SYSTOLIC BLOOD PRESSURE: 160 MMHG | BODY MASS INDEX: 24.38 KG/M2 | HEART RATE: 68 BPM | DIASTOLIC BLOOD PRESSURE: 90 MMHG | HEIGHT: 62 IN | WEIGHT: 132.5 LBS

## 2017-10-10 DIAGNOSIS — Z23 NEED FOR IMMUNIZATION AGAINST INFLUENZA: ICD-10-CM

## 2017-10-10 DIAGNOSIS — R03.0 ELEVATED BLOOD PRESSURE READING: ICD-10-CM

## 2017-10-10 DIAGNOSIS — Z12.11 SCREEN FOR COLON CANCER: ICD-10-CM

## 2017-10-10 DIAGNOSIS — R22.2 PALPABLE MASS OF LOWER BACK: ICD-10-CM

## 2017-10-10 PROCEDURE — G0008 ADMIN INFLUENZA VIRUS VAC: HCPCS | Performed by: FAMILY MEDICINE

## 2017-10-10 PROCEDURE — 90662 IIV NO PRSV INCREASED AG IM: CPT | Performed by: FAMILY MEDICINE

## 2017-10-10 PROCEDURE — 99213 OFFICE O/P EST LOW 20 MIN: CPT | Mod: 25 | Performed by: FAMILY MEDICINE

## 2017-10-10 ASSESSMENT — PATIENT HEALTH QUESTIONNAIRE - PHQ9: CLINICAL INTERPRETATION OF PHQ2 SCORE: 0

## 2017-10-11 ENCOUNTER — HOSPITAL ENCOUNTER (OUTPATIENT)
Dept: RADIOLOGY | Facility: MEDICAL CENTER | Age: 72
End: 2017-10-11
Attending: FAMILY MEDICINE
Payer: MEDICARE

## 2017-10-11 DIAGNOSIS — R22.2 PALPABLE MASS OF LOWER BACK: ICD-10-CM

## 2017-10-11 PROCEDURE — 76705 ECHO EXAM OF ABDOMEN: CPT

## 2017-10-11 NOTE — PROGRESS NOTES
"Subjective:      Karla Prince is a 72 y.o. female who presents with Other (lump on back) and Other (spot on side of nose)            HPI     Patient is here for follow-up. She was seen at [facility] ER on 8/14/17 for left-sided back pain radiating to the buttock and a bulge in the same area of the pain. She said she has noticed this bulge since April 2017 after she sustained a fall into a snow hole.    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 Patient was managed conservatively for possible spasm of the muscle in the left lumbar region and was given cyclobenzaprine and ibuprofen.    She said the pain has resolved but the ball just still there. She does not have any problems with range of movement of the back even when she bends over to touch her toes. She denies any problems with urination or bowel movement.    She has no prior history of hypertension but her blood pressure is elevated today. She denied any headache, dizziness, lightheadedness, chest pain, palpitations, shortness of breath or leg edema.    Past medical history, past surgical history, family history reviewed-no changes    Social history reviewed-no changes    Allergies reviewed-no changes    Medications reviewed-no changes    ROS     Review of systems as per history of present illness, the rest are negative.       Objective:     /90   Pulse 68   Temp 36.8 °C (98.2 °F)   Ht 1.575 m (5' 2\")   Wt 60.1 kg (132 lb 7.9 oz)   SpO2 97%   BMI 24.23 kg/m²      Physical Exam     Examined alert, awake, oriented, not in distress    Neck-supple, no lymphadenopathy, no thyromegaly  Lungs-clear to auscultation, no rales, no wheezes  Heart-regular rate and rhythm, no murmur  Extremities-no edema, clubbing, cyanosis  Back exam-13 x 8 cm very firm and hard bulge left lumbar region which may be hypertrophic paraspinal " muscle. This is nontender without any evidence of skin changes.          I have reviewed the ER records.      Assessment/Plan:     1. Palpable mass of lower back  I will get an ultrasound to further evaluate this area to make sure were not dealing with mass/tumor that would correspond to the palpable abnormality. Further recommendation will depend on results.  - US-ABDOMEN LIMITED; Future    2. Elevated blood pressure reading  No prior history of hypertension. She will start monitoring her blood pressure at home and she will let me know if persistently elevated 150/90 or higher.    3. Screen for colon cancer  No prior colonoscopy. She does not want to do it at this time but she agrees to doing FIT.  - OCCULT BLOOD FECES IMMUNOASSAY; Future    4. Need for immunization against influenza  High-dose flu shot was given.  - INFLUENZA VACCINE, HIGH DOSE (65+ ONLY)    Keep appointment in November for her annual physical exam.      Please note that this dictation was created using voice recognition software. I have worked with consultants from the vendor as well as technical experts from [facility]  [employer] to optimize the interface. I have made every reasonable attempt to correct obvious errors, but I expect that there are errors of grammar and possibly content I did not discover before finalizing the note.

## 2017-10-26 ENCOUNTER — TELEPHONE (OUTPATIENT)
Dept: MEDICAL GROUP | Facility: PHYSICIAN GROUP | Age: 72
End: 2017-10-26

## 2017-10-26 NOTE — TELEPHONE ENCOUNTER
Left message for patient to call back regarding pre-visit planning. Please transfer call to Yolande at [phone].

## 2017-10-30 NOTE — TELEPHONE ENCOUNTER
Pt called and wanted to reschedule appointment till April of 2018, so I rescheduled till 04/11/18 10:40 with me and 1:00 with Dr Phelps.

## 2017-11-06 ENCOUNTER — HOSPITAL ENCOUNTER (OUTPATIENT)
Dept: RADIOLOGY | Facility: MEDICAL CENTER | Age: 72
End: 2017-11-06
Attending: FAMILY MEDICINE
Payer: MEDICARE

## 2017-11-06 DIAGNOSIS — Z12.31 SCREENING MAMMOGRAM, ENCOUNTER FOR: ICD-10-CM

## 2017-11-06 PROCEDURE — G0202 SCR MAMMO BI INCL CAD: HCPCS

## 2017-11-07 ENCOUNTER — TELEPHONE (OUTPATIENT)
Dept: MEDICAL GROUP | Facility: PHYSICIAN GROUP | Age: 72
End: 2017-11-07

## 2017-11-07 NOTE — TELEPHONE ENCOUNTER
Spoke with patient and let them know Yas Phelps M.D.'s message.  Patient declined sono cine scan and will continue with yearly mammogram.

## 2017-11-07 NOTE — TELEPHONE ENCOUNTER
----- Message from Yas Phelps M.D. sent at 11/7/2017  1:47 PM PST -----  Your mammogram came back no evidence of malignancy. You however have dense breasts which may miss small masses. You have an option of doing ultrasound for dense breasts called sono [employer]. This is not covered by your insurance and can cost around $125. If you want to do this test please let me know so I can order it. Otherwise we will just do the yearly screening mammogram.

## 2018-04-04 ENCOUNTER — TELEPHONE (OUTPATIENT)
Dept: MEDICAL GROUP | Facility: PHYSICIAN GROUP | Age: 73
End: 2018-04-04

## 2018-04-04 NOTE — TELEPHONE ENCOUNTER
ANNUAL WELLNESS VISIT PRE-VISIT PLANNING WITHOUT OUTREACH    1.  Reviewed note from last office visit with PCP: YES    2.  If any orders were placed at last visit, do we have Results/Consult Notes?        •  Labs - Labs ordered, NOT completed. Patient advised to complete prior to next appointment.  Note: If patient appointment is for lab review and patient did not complete labs, check with provider if OK to reschedule patient until labs completed.       •  Imaging - Imaging was not ordered at last office visit.       •  Referrals - No referrals were ordered at last office visit.    3.  Immunizations were updated in [facility] using WebIZ?: Yes       •  WebIZ Recommendations: PNEUMOVAX (PPSV23)        •  Is patient due for Tdap? NO       •  Is patient due for Shingles? YES. Patient was notified of copay/out of pocket cost.     4.  Patient is due for the following Health Maintenance Topics:   Health Maintenance Due   Topic Date Due   • Annual Wellness Visit  1945   • COLONOSCOPY  08/14/1997   • IMM PNEUMOCOCCAL 65+ (ADULT) LOW/MEDIUM RISK SERIES (2 of 2 - PPSV23) 02/23/2017       - Patient has completed FLU, PNEUMOVAX (PPSV23), PREVNAR (PCV13) , TDAP and ZOSTAVAX (Shingles) Immunization(s) per WebIZ. Chart has been updated.    5.  Reviewed/Updated the following with patient:       •   Preferred Pharmacy? YES       •   Preferred Lab? YES       •   Preferred Communication? YES       •   Allergies? YES       •   Medications? YES. Was Abstract Encounter opened and chart updated? YES       •   Social History? YES. Was Abstract Encounter opened and chart updated? YES       •   Family History (document living status of immediate family members and if + hx of  cancer, diabetes, hypertension, hyperlipidemia, heart attack, stroke) YES. Was Abstract Encounter opened and chart updated? YES    6.  Care Team Updated:       •   DME Company (gait device, O2, CPAP, etc.): NO       •   Other Specialists (eye doctor, derm, GYN,  cardiology, endo, etc): YES    7.  Patient has the following Care Path diagnoses on Problem List:  NONE    8.  MDX printed and highlighted for Provider? YES    9.  Patient was advised: “This is a free wellness visit. The provider will screen for medical conditions to help you stay healthy. If you have other concerns to address you may be asked to discuss these at a separate visit or there may be an additional fee.”     10.  Patient was informed to arrive 15 min prior to their scheduled appointment and bring in their medication bottles.

## 2018-04-11 ENCOUNTER — OFFICE VISIT (OUTPATIENT)
Dept: MEDICAL GROUP | Facility: PHYSICIAN GROUP | Age: 73
End: 2018-04-11
Payer: MEDICARE

## 2018-04-11 VITALS
BODY MASS INDEX: 24.6 KG/M2 | TEMPERATURE: 97.8 F | RESPIRATION RATE: 16 BRPM | HEART RATE: 59 BPM | OXYGEN SATURATION: 97 % | WEIGHT: 130.29 LBS | DIASTOLIC BLOOD PRESSURE: 90 MMHG | SYSTOLIC BLOOD PRESSURE: 151 MMHG | HEIGHT: 61 IN

## 2018-04-11 DIAGNOSIS — E78.5 DYSLIPIDEMIA: ICD-10-CM

## 2018-04-11 DIAGNOSIS — M81.0 OSTEOPOROSIS OF LOWER LEG: ICD-10-CM

## 2018-04-11 DIAGNOSIS — R73.01 IMPAIRED FASTING BLOOD SUGAR: ICD-10-CM

## 2018-04-11 DIAGNOSIS — Z12.11 SCREEN FOR COLON CANCER: ICD-10-CM

## 2018-04-11 DIAGNOSIS — Z00.00 MEDICARE ANNUAL WELLNESS VISIT, INITIAL: ICD-10-CM

## 2018-04-11 DIAGNOSIS — E55.9 VITAMIN D DEFICIENCY: ICD-10-CM

## 2018-04-11 DIAGNOSIS — R03.0 ELEVATED BLOOD PRESSURE READING: ICD-10-CM

## 2018-04-11 PROCEDURE — G0438 PPPS, INITIAL VISIT: HCPCS | Performed by: FAMILY MEDICINE

## 2018-04-11 ASSESSMENT — ACTIVITIES OF DAILY LIVING (ADL): BATHING_REQUIRES_ASSISTANCE: 0

## 2018-04-11 ASSESSMENT — PATIENT HEALTH QUESTIONNAIRE - PHQ9: CLINICAL INTERPRETATION OF PHQ2 SCORE: 0

## 2018-04-11 NOTE — PROGRESS NOTES
Chief Complaint   Patient presents with   • Annual Wellness Visit         HPI:  Karla is a 72 y.o. here for Medicare Annual Wellness Visit        Patient Active Problem List    Diagnosis Date Noted   • Dyslipidemia 05/31/2016   • Impaired fasting blood sugar 05/31/2016   • Osteoporosis 03/04/2016       Current Outpatient Prescriptions   Medication Sig Dispense Refill   • vitamin D (CHOLECALCIFEROL) 1000 UNIT Tab Take 1,000 Units by mouth every day.     • Calcium Citrate (CITRACAL PO) Take  by mouth.     • aspirin (ASA) 81 MG CHEW chewable tablet Take 81 mg by mouth every day.     • ibuprofen (MOTRIN) 600 MG Tab Take 1 Tab by mouth every 6 hours as needed. 30 Tab 0   • cyclobenzaprine (FLEXERIL) 10 MG Tab Take 1 Tab by mouth 3 times a day as needed. 20 Tab 0   • alendronate (FOSAMAX) 70 MG Tab Take 1 Tab by mouth every 7 days. 12 Tab 3   • clobetasol (TEMOVATE) 0.05 % Cream Apply  to affected area(s) 2 times a day.     • phenazopyridine (PYRIDIUM) 200 MG TABS Take 1 Tab by mouth 3 times a day as needed. 6 Tab 0   • Acetaminophen (TYLENOL PO) Take  by mouth.       No current facility-administered medications for this visit.         Patient is taking medications as noted in medication list.  Current supplements as per medication list.     Allergies: Patient has no known allergies.    Current social contact/activities: walks lunch and dinner with friends, casino   Patient's perception of their health: good    Is patient current with immunizations? NO  PPSV-23    She  reports that she has been smoking Cigarettes.  She has a 13.00 pack-year smoking history. She has never used smokeless tobacco. She reports that she drinks alcohol. She reports that she does not use drugs.  Ready to quit: Not Answered  Counseling given: Not Answered        DPA/Advanced directive: Patient has Advanced Directive on file.     ROS:    Gait: Uses no assistive device   Ostomy: no   Other tubes: no   Amputations: no   Chronic oxygen use no   Last  eye exam 11/17   Wears hearing aids: no   : Denies any urinary leakage during the last 6 months      Screening:        Little interest or pleasure in doing things?  0 - not at all  Feeling down, depressed, or hopeless? 0 - not at all  Patient Health Questionnaire Score: 0    If depressive symptoms identified deferred to follow up visit unless specifically addressed in assessment and plan.    Interpretation of PHQ-9 Total Score   Score Severity   1-4 No Depression   5-9 Mild Depression   10-14 Moderate Depression   15-19 Moderately Severe Depression   20-27 Severe Depression    Screening for Cognitive Impairment    Three Minute Recall (apple, watch, tru)  3/3 Banana, [place], fence  Draw clock face with all 12 numbers set to the hand to show 10 minutes past 11 o'clock  1 Time 10:10   5/5  If cognitive concerns identified, deferred for follow up unless specifically addressed in assessment and plan.    Fall Risk Assessment    Has the patient had two or more falls in the last year or any fall with injury in the last year?  No  If fall risk identified, deferred for follow up unless specifically addressed in assessment and plan.    Safety Assessment    Throw rugs on floor.  Yes  Handrails on all stairs.  Yes  Good lighting in all hallways.  Yes  Difficulty hearing.  No  Patient counseled about all safety risks that were identified.    Functional Assessment ADLs    Are there any barriers preventing you from cooking for yourself or meeting nutritional needs?  No.    Are there any barriers preventing you from driving safely or obtaining transportation?  No.    Are there any barriers preventing you from using a telephone or calling for help?  No.    Are there any barriers preventing you from shopping?  No.    Are there any barriers preventing you from taking care of your own finances?  No.    Are there any barriers preventing you from managing your medications?  No.    Are there any barriers preventing you from showering,  "bathing or dressing yourself?  No.    Are you currently engaging any exercise or physical activity?  Yes.  walk    Health Maintenance Summary                Annual Wellness Visit Overdue 1945     COLONOSCOPY Overdue 8/14/1997     IMM PNEUMOCOCCAL 65+ (ADULT) LOW/MEDIUM RISK SERIES Overdue 2/23/2017      Done 2/23/2016 Imm Admin: Pneumococcal Conjugate Vaccine (Prevnar/PCV-13)     Patient has more history with this topic...    MAMMOGRAM Next Due 11/6/2018      Done 11/6/2017 MA-MAMMO SCREENING BILAT W/TOMOSYNTHESIS W/CAD     Patient has more history with this topic...    BONE DENSITY Next Due 3/3/2021      Done 3/3/2016 DS-BONE DENSITY STUDY (DEXA)    IMM DTaP/Tdap/Td Vaccine Next Due 10/17/2026      Done 10/17/2016 Imm Admin: Tdap Vaccine          Patient Care Team:  Yas Phelps M.D. as PCP - General (Family Medicine)  John Richards M.D. as Consulting Physician (Ophthalmology)  [facility] as Consulting Physician    Social History   Substance Use Topics   • Smoking status: Current Every Day Smoker     Packs/day: 0.25     Years: 52.00     Types: Cigarettes   • Smokeless tobacco: Never Used      Comment: dec from 1/2 ppd to 1/4 ppd since 2/16   • Alcohol use 0.0 oz/week      Comment: glass of wine with dinner     Family History   Problem Relation Age of Onset   • Dementia Mother    • Diabetes Father    • Hyperlipidemia Sister    • Hyperlipidemia Brother      She  has a past medical history of Dyslipidemia; Impaired fasting blood sugar; Osteoporosis (3/16); and Osteoporosis.   Past Surgical History:   Procedure Laterality Date   • HYSTERECTOMY, TOTAL ABDOMINAL      w/ BSO due to endometriosis           Exam:     Blood pressure 138/88, pulse (!) 59, temperature 36.6 °C (97.8 °F), resp. rate 16, height 1.537 m (5' 0.5\"), weight 59.1 kg (130 lb 4.7 oz), SpO2 97 %. Body mass index is 25.03 kg/m².    Hearing good.    Dentition fair  Alert, oriented in no acute distress.  Eye contact is good, " speech goal directed, affect calm  Skin:                 Warm, no rashes in visible areas    Head:               Atraumatic, normocephalic    Eyes:               Pupils equal, round and reactive to light, extraocular muscles movement                           intact, clear conjunctivae    Ears:               Tympanic membranes intact without evidence of infection    Nose:              No nasal discharge, no obstruction    Mouth:             No oral lesions    Throat:            Tonsils not enlarged, no exudates    Neck:              Trachea midline, supple, no lymphadenopathy, no thyromegaly    Lungs:             Clear to auscultation, no rales, no wheezes, no rhonchi    Heart:              Regular rate and rhythm, no murmur    Abdomen:       Good bowel sounds, soft, nontender, no hepatosplenomegaly, no masses    Extremities:    No edema, no clubbing, no cyanosis    Psych:            Alert and oriented x3, normal affect    Neuro:            Cranial nerves intact, Motor strength 5/5 upper and lower extremities,                                 DTRs 2+, sensation intact to light touch, negative Romberg    Assessment and Plan. The following treatment and monitoring plan is recommended:       1. Medicare annual wellness visit, initial  Up-to-date with all immunizations. Discussed new shingles vaccine shingrix. We will give it on her follow-up visit when it is already available in our facility. Up-to-date with mammogram. She has not had a colonoscopy. In the past she refused colonoscopy but this time she agrees to be referred to the GI specialist. Referral placed.     2. Dyslipidemia  This is being managed with her own efforts only. Her last blood work was in April 2017 that came back all at target except triglycerides were high at 258. We will do follow-up lipid panel.  - LIPID PROFILE; Future  - COMP METABOLIC PANEL; Future  - HEMOGLOBIN A1C; Future  - CBC WITH DIFFERENTIAL; Future  - VITAMIN D,25 HYDROXY; Future    3.  Impaired fasting blood sugar  She will continue to manage this by watching her diet, exercise and keeping a healthy weight.  - LIPID PROFILE; Future  - COMP METABOLIC PANEL; Future  - HEMOGLOBIN A1C; Future  - CBC WITH DIFFERENTIAL; Future  - VITAMIN D,25 HYDROXY; Future    4. Osteoporosis of lower leg  She has refused biphosphonate because of fear of side effects. She is on calcium and vitamin D supplementation. The last bone density scan was in 2016. We will do an updated bone density scan.  - LIPID PROFILE; Future  - COMP METABOLIC PANEL; Future  - HEMOGLOBIN A1C; Future  - CBC WITH DIFFERENTIAL; Future  - VITAMIN D,25 HYDROXY; Future  - DS-BONE DENSITY STUDY (DEXA); Future    5. Vitamin D deficiency  Continue vitamin D supplementation and we will check vitamin D level.  - LIPID PROFILE; Future  - COMP METABOLIC PANEL; Future  - HEMOGLOBIN A1C; Future  - CBC WITH DIFFERENTIAL; Future  - VITAMIN D,25 HYDROXY; Future    6. Elevated blood pressure reading  We have seen on an off elevation of the blood pressure. On her last visit 6 months ago he was already elevated. She said she has been monitoring her blood pressures at home and have been running from 137-142/79-80. She thinks she has whitecoat hypertension. She will monitor blood pressure at home and keep record and bring it on follow-up. Advised to watch the salt intake, exercise regularly and keep healthy weight. She will bring her blood pressure machine next visit so we can compare with our machines reading. I will see her in 2 months.    7. Screen for colon cancer  Referral placed for colonoscopy.  - REFERRAL TO GI FOR COLONOSCOPY      Services suggested: No services needed at this time  Health Care Screening: Age-appropriate preventive services recommended by USPTF and ACIP covered by Medicare were discussed today. Services ordered if indicated and agreed upon by the patient.  Referrals offered: PT/OT/Nutrition counseling/Behavioral Health/Smoking cessation  as per orders if indicated.    Discussion today about general wellness and lifestyle habits:    · Prevent falls and reduce trip hazards; Cautioned about securing or removing rugs.  · Have a working fire alarm and carbon monoxide detector;   · Engage in regular physical activity and social activities       Follow-up: Follow-up in 2 months.      Please note that this dictation was created using voice recognition software. I have worked with consultants from the vendor as well as technical experts from [facility]  [employer] to optimize the interface. I have made every reasonable attempt to correct obvious errors, but I expect that there are errors of grammar and possibly content I did not discover before finalizing the note.      Addendum:    I saw this patient on 4/11/2018 and due to technical difficulty issues I was not shown as author of the note - Yas Phelps MD

## 2018-04-13 ENCOUNTER — HOSPITAL ENCOUNTER (OUTPATIENT)
Dept: LAB | Facility: MEDICAL CENTER | Age: 73
End: 2018-04-13
Attending: FAMILY MEDICINE
Payer: MEDICARE

## 2018-04-13 DIAGNOSIS — R73.01 IMPAIRED FASTING BLOOD SUGAR: ICD-10-CM

## 2018-04-13 DIAGNOSIS — M81.0 OSTEOPOROSIS OF LOWER LEG: ICD-10-CM

## 2018-04-13 DIAGNOSIS — E55.9 VITAMIN D DEFICIENCY: ICD-10-CM

## 2018-04-13 DIAGNOSIS — E78.5 DYSLIPIDEMIA: ICD-10-CM

## 2018-04-13 LAB
25(OH)D3 SERPL-MCNC: 26 NG/ML (ref 30–100)
ALBUMIN SERPL BCP-MCNC: 4.4 G/DL (ref 3.2–4.9)
ALBUMIN/GLOB SERPL: 1.4 G/DL
ALP SERPL-CCNC: 63 U/L (ref 30–99)
ALT SERPL-CCNC: 18 U/L (ref 2–50)
ANION GAP SERPL CALC-SCNC: 11 MMOL/L (ref 0–11.9)
AST SERPL-CCNC: 21 U/L (ref 12–45)
BASOPHILS # BLD AUTO: 0.8 % (ref 0–1.8)
BASOPHILS # BLD: 0.06 K/UL (ref 0–0.12)
BILIRUB SERPL-MCNC: 0.8 MG/DL (ref 0.1–1.5)
BUN SERPL-MCNC: 15 MG/DL (ref 8–22)
CALCIUM SERPL-MCNC: 9.8 MG/DL (ref 8.5–10.5)
CHLORIDE SERPL-SCNC: 102 MMOL/L (ref 96–112)
CHOLEST SERPL-MCNC: 226 MG/DL (ref 100–199)
CO2 SERPL-SCNC: 28 MMOL/L (ref 20–33)
CREAT SERPL-MCNC: 0.86 MG/DL (ref 0.5–1.4)
EOSINOPHIL # BLD AUTO: 0.15 K/UL (ref 0–0.51)
EOSINOPHIL NFR BLD: 1.9 % (ref 0–6.9)
ERYTHROCYTE [DISTWIDTH] IN BLOOD BY AUTOMATED COUNT: 45.8 FL (ref 35.9–50)
EST. AVERAGE GLUCOSE BLD GHB EST-MCNC: 108 MG/DL
GLOBULIN SER CALC-MCNC: 3.2 G/DL (ref 1.9–3.5)
GLUCOSE SERPL-MCNC: 86 MG/DL (ref 65–99)
HBA1C MFR BLD: 5.4 % (ref 0–5.6)
HCT VFR BLD AUTO: 47.8 % (ref 37–47)
HDLC SERPL-MCNC: 56 MG/DL
HGB BLD-MCNC: 15.5 G/DL (ref 12–16)
IMM GRANULOCYTES # BLD AUTO: 0.01 K/UL (ref 0–0.11)
IMM GRANULOCYTES NFR BLD AUTO: 0.1 % (ref 0–0.9)
LDLC SERPL CALC-MCNC: 91 MG/DL
LYMPHOCYTES # BLD AUTO: 2.1 K/UL (ref 1–4.8)
LYMPHOCYTES NFR BLD: 27.2 % (ref 22–41)
MCH RBC QN AUTO: 32.4 PG (ref 27–33)
MCHC RBC AUTO-ENTMCNC: 32.4 G/DL (ref 33.6–35)
MCV RBC AUTO: 100 FL (ref 81.4–97.8)
MONOCYTES # BLD AUTO: 0.59 K/UL (ref 0–0.85)
MONOCYTES NFR BLD AUTO: 7.7 % (ref 0–13.4)
NEUTROPHILS # BLD AUTO: 4.8 K/UL (ref 2–7.15)
NEUTROPHILS NFR BLD: 62.3 % (ref 44–72)
NRBC # BLD AUTO: 0 K/UL
NRBC BLD-RTO: 0 /100 WBC
PLATELET # BLD AUTO: 217 K/UL (ref 164–446)
PMV BLD AUTO: 11.7 FL (ref 9–12.9)
POTASSIUM SERPL-SCNC: 3.9 MMOL/L (ref 3.6–5.5)
PROT SERPL-MCNC: 7.6 G/DL (ref 6–8.2)
RBC # BLD AUTO: 4.78 M/UL (ref 4.2–5.4)
SODIUM SERPL-SCNC: 141 MMOL/L (ref 135–145)
TRIGL SERPL-MCNC: 393 MG/DL (ref 0–149)
WBC # BLD AUTO: 7.7 K/UL (ref 4.8–10.8)

## 2018-04-13 PROCEDURE — 80053 COMPREHEN METABOLIC PANEL: CPT

## 2018-04-13 PROCEDURE — 36415 COLL VENOUS BLD VENIPUNCTURE: CPT

## 2018-04-13 PROCEDURE — 82306 VITAMIN D 25 HYDROXY: CPT

## 2018-04-13 PROCEDURE — 85025 COMPLETE CBC W/AUTO DIFF WBC: CPT

## 2018-04-13 PROCEDURE — 80061 LIPID PANEL: CPT

## 2018-04-13 PROCEDURE — 83036 HEMOGLOBIN GLYCOSYLATED A1C: CPT

## 2018-04-16 ENCOUNTER — TELEPHONE (OUTPATIENT)
Dept: MEDICAL GROUP | Facility: PHYSICIAN GROUP | Age: 73
End: 2018-04-16

## 2018-04-16 NOTE — TELEPHONE ENCOUNTER
----- Message from Yas Phelps M.D. sent at 4/16/2018  6:50 AM PDT -----  Blood sugar normal. Liver and kidney functions are normal.  No anemia. Vitamin D improved from 23-26. Normal range of . Therefore this is not yet at the right level. Add another thousand units of vitamin D to the current vitamin D dose.  Triglycerides increased from 258-393. They need to be below 150. They are from carbs and sweets so she needs to avoid sweets and decrease the carbs. HDL at the right level of 56. This needs to be 40 or higher. LDL 91. This needs to be below 100. I will discuss with her next visit possible treatment with cholesterol medication.

## 2018-04-19 ENCOUNTER — HOSPITAL ENCOUNTER (OUTPATIENT)
Dept: RADIOLOGY | Facility: MEDICAL CENTER | Age: 73
End: 2018-04-19
Attending: FAMILY MEDICINE
Payer: MEDICARE

## 2018-04-19 DIAGNOSIS — M81.0 OSTEOPOROSIS OF LOWER LEG: ICD-10-CM

## 2018-04-19 PROCEDURE — 77080 DXA BONE DENSITY AXIAL: CPT

## 2018-04-20 ENCOUNTER — TELEPHONE (OUTPATIENT)
Dept: MEDICAL GROUP | Facility: PHYSICIAN GROUP | Age: 73
End: 2018-04-20

## 2018-04-20 NOTE — TELEPHONE ENCOUNTER
----- Message from Yas Phelps M.D. sent at 4/20/2018 10:39 AM PDT -----  Bone density scan came back there is improvement of the score in the hip. The osteoporosis of the hip is now gone and you have osteopenia which is mild thinning of the bone. The bone density of the lumbar spine has decreased but not yet consistent with osteoporosis. Continue calcium and vitamin D supplementation and do weightbearing exercises regularly to strengthen the bones.

## 2018-06-04 ENCOUNTER — APPOINTMENT (OUTPATIENT)
Dept: OTHER | Facility: IMAGING CENTER | Age: 73
End: 2018-06-04

## 2018-06-13 ENCOUNTER — OFFICE VISIT (OUTPATIENT)
Dept: MEDICAL GROUP | Facility: PHYSICIAN GROUP | Age: 73
End: 2018-06-13
Payer: MEDICARE

## 2018-06-13 VITALS
OXYGEN SATURATION: 96 % | TEMPERATURE: 97.9 F | BODY MASS INDEX: 22.3 KG/M2 | HEIGHT: 65 IN | HEART RATE: 68 BPM | SYSTOLIC BLOOD PRESSURE: 164 MMHG | WEIGHT: 133.82 LBS | DIASTOLIC BLOOD PRESSURE: 80 MMHG

## 2018-06-13 DIAGNOSIS — I10 ESSENTIAL HYPERTENSION: ICD-10-CM

## 2018-06-13 DIAGNOSIS — E55.9 VITAMIN D DEFICIENCY: ICD-10-CM

## 2018-06-13 DIAGNOSIS — E78.5 DYSLIPIDEMIA: ICD-10-CM

## 2018-06-13 DIAGNOSIS — R73.01 IMPAIRED FASTING BLOOD SUGAR: ICD-10-CM

## 2018-06-13 PROCEDURE — 99214 OFFICE O/P EST MOD 30 MIN: CPT | Performed by: FAMILY MEDICINE

## 2018-06-14 NOTE — PROGRESS NOTES
"Subjective:      Karla Prince is a 72 y.o. female who presents with Hypertension            HPI     Patient returns for follow-up of her medical problems.    In terms of her hypertension, we have her manage this with her own efforts. She has been monitoring her blood pressures at home and they have been running most of the time below 150/90 and only a few occasions that they were in the 150s to 160s over high 80s.    For her vitamin D deficiency, she continues take vitamin D supplementation regularly and we had her increase the dose to 3000 international units daily because it was not yet adequately replaced at 26 when we had her blood work in April 2018.    She continues to manage her dyslipidemia with her own efforts. The last lipid panel came back triglycerides were still elevated and higher than before in the 300s, HDL was 56 and LDL was 91.    We also follow her for impaired fasting blood sugar. In the last 3 to blood work the blood sugar has been normal. The most recent fasting blood sugar was 86 and the hemoglobin A1c was 5.4.    Past medical history, past surgical history, family history reviewed-no changes    Social history reviewed-no changes    Allergies reviewed-no changes    Medications reviewed-no changes    ROS     As per history of present illness, the rest are negative.       Objective:     BP (!) 164/80   Pulse 68   Temp 36.6 °C (97.9 °F)   Ht 1.651 m (5' 5\")   Wt 60.7 kg (133 lb 13.1 oz)   SpO2 96%   BMI 22.27 kg/m²      Physical Exam     Examined alert, awake, oriented, not in distress    Neck-supple, no lymphadenopathy, no thyromegaly  Lungs-clear to auscultation, no rales, no wheezes  Heart-regular rate and rhythm, no murmur  Extremities-no edema, clubbing, cyanosis          Assessment/Plan:     1. Essential hypertension  Home blood pressure readings are running good compared to our office reading. She wants to continue managing this with her own efforts without medication. Advised to " watch the salt intake, exercise regularly and keep a healthy weight. I will reevaluate when she returns for follow-up in 2 months.    2. Vitamin D deficiency  Continue taking vitamin D supplementation 3000 international units daily    3. Dyslipidemia  She will work harder on watching the cards, sweets, regular exercises and keeping a healthy weight.    4. Impaired fasting blood sugar  Continue to avoid sweets and decreased carbs.      Please note that this dictation was created using voice recognition software. I have worked with consultants from the vendor as well as technical experts from [employer] to optimize the interface. I have made every reasonable attempt to correct obvious errors, but I expect that there are errors of grammar and possibly content I did not discover before finalizing the note.

## 2018-08-21 ENCOUNTER — TELEPHONE (OUTPATIENT)
Dept: MEDICAL GROUP | Facility: PHYSICIAN GROUP | Age: 73
End: 2018-08-21

## 2018-08-21 NOTE — TELEPHONE ENCOUNTER
ESTABLISHED PATIENT PRE-VISIT PLANNING     Note: Patient will not be contacted if there is no indication to call.     1.  Reviewed notes from the last few office visits within the medical group: Yes    2.  If any orders were placed at last visit or intended to be done for this visit (i.e. 6 mos follow-up), do we have Results/Consult Notes?        •  Labs - Labs were not ordered at last office visit.   Note: If patient appointment is for lab review and patient did not complete labs, check with provider if OK to reschedule patient until labs completed.       •  Imaging - Imaging was not ordered at last office visit.       •  Referrals - Referral ordered, patient was seen and consult notes are in chart. Care Teams updated  YES.    3. Is this appointment scheduled as a Hospital Follow-Up? No    4.  Immunizations were updated in Epic using WebIZ?: Epic matches WebIZ       •  Web Iz Recommendations: FLU, PNEUMOVAX (PPSV23), TD and ZOSTAVAX (Shingles)    5.  Patient is due for the following Health Maintenance Topics:   Health Maintenance Due   Topic Date Due   • IMM ZOSTER VACCINES (2 of 3) 12/07/2009   • IMM PNEUMOCOCCAL 65+ (ADULT) LOW/MEDIUM RISK SERIES (2 of 2 - PPSV23) 02/23/2017   • IMM INFLUENZA (1) 09/01/2018       - Patient has completed FLU, PNEUMOVAX (PPSV23), PREVNAR (PCV13) , TDAP and SHINGRIX (Shingles) Immunization(s) per WebIZ. Chart has been updated.    6.  MDX printed for Provider? NO    7.  Patient was NOT informed to arrive 15 min prior to their scheduled appointment and bring in their medication bottles.

## 2018-08-22 ENCOUNTER — OFFICE VISIT (OUTPATIENT)
Dept: MEDICAL GROUP | Facility: PHYSICIAN GROUP | Age: 73
End: 2018-08-22
Payer: MEDICARE

## 2018-08-22 VITALS
HEIGHT: 65 IN | WEIGHT: 130.73 LBS | TEMPERATURE: 97.4 F | HEART RATE: 82 BPM | OXYGEN SATURATION: 96 % | SYSTOLIC BLOOD PRESSURE: 150 MMHG | BODY MASS INDEX: 21.78 KG/M2 | DIASTOLIC BLOOD PRESSURE: 90 MMHG

## 2018-08-22 DIAGNOSIS — R03.0 WHITE COAT SYNDROME WITHOUT HYPERTENSION: ICD-10-CM

## 2018-08-22 PROCEDURE — 99212 OFFICE O/P EST SF 10 MIN: CPT | Performed by: FAMILY MEDICINE

## 2018-08-23 NOTE — PROGRESS NOTES
"Subjective:      Karla Prince is a 73 y.o. female who presents with Hypertension            HPI     Patient returns for follow-up of her blood pressure.    Office blood pressure reading tends to run higher than at home.  We have already compared her machines reading with our machine and her machine is accurate.  She brought her blood pressure log and they have been running from 129 -147/65-85.  This morning at home the blood pressure was high at 156/89 and 168/91.  She denies any headache, dizziness, lightheadedness, chest pain, palpitations, shortness of breath, leg edema.  She would like to avoid medication if not necessary.    Past medical history, past surgical history, family history reviewed-no changes    Social history reviewed-no changes    Allergies reviewed-no changes    Medications reviewed-no changes    ROS   As per HPI, the rest are negative.         Objective:     /90   Pulse 82   Temp 36.3 °C (97.4 °F)   Ht 1.651 m (5' 5\")   Wt 59.3 kg (130 lb 11.7 oz)   SpO2 96%   BMI 21.76 kg/m²      Physical Exam      Examined alert, awake, oriented, not in distress    Neck-supple, no lymphadenopathy, no thyromegaly  Lungs-clear to auscultation, no rales, no wheezes  Heart-regular rate and rhythm, no murmur  Extremities-no edema, clubbing, cyanosis          Assessment/Plan:     1. White coat syndrome without hypertension  Home blood pressure readings are running good and under control but high in the office.  Most likely she has whitecoat hypertension.  Today in the morning blood pressure at home was elevated and the same as all reading in the office.  She will monitor blood pressure at home and if they are persistently elevated 150/90 or higher she will let me know because she may need to start medication.  Advised to avoid salty foods, exercise regularly and keep a healthy weight.  If blood pressure runs good below 150/90 consistently she will continue to check her blood pressure about once or twice a " week and keep a record.      Please note that this dictation was created using voice recognition software. I have worked with consultants from the vendor as well as technical experts from [facility] to optimize the interface. I have made every reasonable attempt to correct obvious errors, but I expect that there are errors of grammar and possibly content I did not discover before finalizing the note.

## 2018-10-08 ENCOUNTER — OFFICE VISIT (OUTPATIENT)
Dept: URGENT CARE | Facility: PHYSICIAN GROUP | Age: 73
End: 2018-10-08
Payer: MEDICARE

## 2018-10-08 VITALS
HEIGHT: 61 IN | TEMPERATURE: 97.7 F | OXYGEN SATURATION: 98 % | SYSTOLIC BLOOD PRESSURE: 142 MMHG | WEIGHT: 130 LBS | BODY MASS INDEX: 24.55 KG/M2 | DIASTOLIC BLOOD PRESSURE: 100 MMHG | HEART RATE: 68 BPM | RESPIRATION RATE: 14 BRPM

## 2018-10-08 DIAGNOSIS — L01.00 IMPETIGO: ICD-10-CM

## 2018-10-08 PROCEDURE — 99214 OFFICE O/P EST MOD 30 MIN: CPT | Performed by: FAMILY MEDICINE

## 2018-10-08 RX ORDER — AMOXICILLIN AND CLAVULANATE POTASSIUM 875; 125 MG/1; MG/1
1 TABLET, FILM COATED ORAL 2 TIMES DAILY
Qty: 14 TAB | Refills: 0 | Status: SHIPPED | OUTPATIENT
Start: 2018-10-08 | End: 2018-10-15

## 2018-10-08 ASSESSMENT — ENCOUNTER SYMPTOMS
NAUSEA: 0
FEVER: 0
HEADACHES: 0
CHILLS: 0

## 2018-10-08 NOTE — PROGRESS NOTES
"Subjective:   Karla Prince is a 73 y.o. female who presents for Oral Swelling ([id number] )        Oral Pain   This is a new problem. The current episode started in the past 7 days. The problem occurs constantly. The problem has been gradually worsening. Associated symptoms include a rash. Pertinent negatives include no chills, fever, headaches or nausea.     Review of Systems   Constitutional: Negative for chills and fever.   Gastrointestinal: Negative for nausea.   Skin: Positive for rash.   Neurological: Negative for headaches.     No Known Allergies   Objective:   /100 (BP Location: Left arm, Patient Position: Sitting, BP Cuff Size: Adult)   Pulse 68   Temp 36.5 °C (97.7 °F) (Temporal)   Resp 14   Ht 1.549 m (5' 1\")   Wt 59 kg (130 lb)   SpO2 98%   BMI 24.56 kg/m²   Physical Exam   Constitutional: She is oriented to person, place, and time. She appears well-developed and well-nourished. No distress.   HENT:   Head: Normocephalic and atraumatic.   Mouth/Throat: Oropharynx is clear and moist. Oral lesions present. No lacerations.   Eyes: Pupils are equal, round, and reactive to light. Conjunctivae and EOM are normal.   Cardiovascular: Normal rate and regular rhythm.    No murmur heard.  Pulmonary/Chest: Effort normal and breath sounds normal. No respiratory distress.   Abdominal: Soft. She exhibits no distension. There is no tenderness.   Neurological: She is alert and oriented to person, place, and time. She has normal reflexes. No sensory deficit.   Skin: Skin is warm and dry.   Psychiatric: She has a normal mood and affect.         Assessment/Plan:   Assessment    1. Impetigo  - amoxicillin-clavulanate (AUGMENTIN) 875-125 MG Tab; Take 1 Tab by mouth 2 times a day for 7 days.  Dispense: 14 Tab; Refill: 0    Differential diagnosis, natural history, supportive care, and indications for immediate follow-up discussed.       "

## 2018-10-08 NOTE — PATIENT INSTRUCTIONS
Impetigo, Adult  Impetigo is an infection of the skin. It commonly occurs in young children, but it can also occur in adults. The infection causes itchy blisters and sores that produce brownish-yellow fluid. As the fluid dries, it forms a thick, honey-colored crust. These skin changes usually occur on the face but can also affect other areas of the body. Impetigo usually goes away in 7-10 days with treatment.  CAUSES  Impetigo is caused by two types of bacteria. It may be caused by staphylococci or streptococci bacteria. These bacteria cause impetigo when they get under the surface of the skin. This often happens after some damage to the skin, such as damage from:  · Cuts, scrapes, or scratches.  · Insect bites, especially when you scratch the area of a bite.  · Chickenpox or other illnesses that cause open skin sores.  · Nail biting or chewing.  Impetigo is contagious and can spread easily from one person to another. This may occur through close skin contact or by sharing towels, clothing, or other items with a person who has the infection.  RISK FACTORS  Some things that can increase the risk of getting this infection include:  · Playing sports that include skin-to-skin contact with others.  · Having a skin condition with open sores.  · Having many skin cuts or scrapes.  · Living in an area that has high humidity levels.  · Having poor hygiene.  · Having high levels of staphylococci in your nose.  SIGNS AND SYMPTOMS  Impetigo usually starts out as small blisters, often on the face. The blisters then break open and turn into tiny sores (lesions) with a yellow crust. In some cases, the blisters cause itching or burning. With scratching, irritation, or lack of treatment, these small lesions may get larger. Scratching can also cause impetigo to spread to other parts of the body. The bacteria can get under the fingernails and spread when you touch another area of your skin.  Other possible symptoms include:  · Larger  blisters.  · Pus.  · Swollen lymph glands.  DIAGNOSIS  This condition is usually diagnosed during a physical exam. A skin sample or sample of fluid from a blister may be taken for lab tests that involve growing bacteria (culture test). This can help confirm the diagnosis or help determine the best treatment.  TREATMENT  Mild impetigo can be treated with prescription antibiotic cream. Oral antibiotic medicine may be used in more severe cases. Medicines for itching may also be used.  HOME CARE INSTRUCTIONS  · Take medicines only as directed by your health care provider.  · To help prevent impetigo from spreading to other body areas:  ¨ Keep your fingernails short and clean.  ¨ Do not scratch the blisters or sores.  ¨ Cover infected areas, if necessary, to keep from scratching.  · Gently wash the infected areas with antibiotic soap and water.  · Soak crusted areas in warm, soapy water using antibiotic soap.  ¨ Gently rub the areas to remove crusts. Do not scrub.  · Wash your hands often to avoid spreading this infection.  · Stay home until you have used an antibiotic cream for 48 hours (2 days) or an oral antibiotic medicine for 24 hours (1 day). You should only return to work and activities with other people if your skin shows significant improvement.  PREVENTION   To keep the infection from spreading:  · Stay home until you have used an antibiotic cream for 48 hours or an oral antibiotic for 24 hours.  · Wash your hands often.  · Do not engage in skin-to-skin contact with other people while you have still have blisters.  · Do not share towels, washcloths, or bedding with others while you have the infection.  SEEK MEDICAL CARE IF:  · You develop more blisters or sores despite treatment.  · Other family members get sores.  · Your skin sores are not improving after 48 hours of treatment.  · You have a fever.  SEEK IMMEDIATE MEDICAL CARE IF:  · You see spreading redness or swelling of the skin around your sores.  · You  see red streaks coming from your sores.  · You develop a sore throat.  This information is not intended to replace advice given to you by your health care provider. Make sure you discuss any questions you have with your health care provider.  Document Released: 01/08/2016 Document Reviewed: 01/08/2016  [employer] Interactive Patient Education © 2017 [employer]

## 2019-03-06 ENCOUNTER — OFFICE VISIT (OUTPATIENT)
Dept: MEDICAL GROUP | Facility: PHYSICIAN GROUP | Age: 74
End: 2019-03-06
Payer: MEDICARE

## 2019-03-06 VITALS
SYSTOLIC BLOOD PRESSURE: 150 MMHG | DIASTOLIC BLOOD PRESSURE: 100 MMHG | HEIGHT: 65 IN | BODY MASS INDEX: 21.82 KG/M2 | OXYGEN SATURATION: 96 % | WEIGHT: 130.95 LBS | TEMPERATURE: 97.6 F | HEART RATE: 68 BPM

## 2019-03-06 DIAGNOSIS — R03.0 WHITE COAT SYNDROME WITHOUT HYPERTENSION: ICD-10-CM

## 2019-03-06 DIAGNOSIS — E78.5 DYSLIPIDEMIA: ICD-10-CM

## 2019-03-06 DIAGNOSIS — R73.01 IMPAIRED FASTING BLOOD SUGAR: ICD-10-CM

## 2019-03-06 DIAGNOSIS — M85.89 OSTEOPENIA OF MULTIPLE SITES: ICD-10-CM

## 2019-03-06 DIAGNOSIS — E55.9 VITAMIN D DEFICIENCY: ICD-10-CM

## 2019-03-06 DIAGNOSIS — Z12.39 SCREENING FOR BREAST CANCER: ICD-10-CM

## 2019-03-06 PROCEDURE — 8041 PR SCP AHA: Performed by: FAMILY MEDICINE

## 2019-03-06 PROCEDURE — 99214 OFFICE O/P EST MOD 30 MIN: CPT | Performed by: FAMILY MEDICINE

## 2019-03-06 ASSESSMENT — PATIENT HEALTH QUESTIONNAIRE - PHQ9: CLINICAL INTERPRETATION OF PHQ2 SCORE: 0

## 2019-03-06 NOTE — PROGRESS NOTES
Subjective:      Karla Prince is a 73 y.o. female who presents with Hypertension (SCO AHA)        HPI:    White coat syndrome without hypertension  Patient here today for follow up of her blood pressure, not currently being managed with any medications. She reports that blood pressures measured at home have all been measured 130-140s/70-80s. Patient's blood pressure has a tendency to be elevated the day before and day of an office evaluation. Today blood pressure is 150/100, however, this is most likely due to traveling across town with traffic to this office evaluation. She denies any headache, dizziness, lightheadedness, chest pain, palpitations, shortness of breath, leg edema.  She would like to avoid medication if not necessary.    Vitamin D deficiency  Osteopenia of multiple sites  Patient is still currently taking Vitamin D and calcium supplements for osteopenia of the lumbar spine and the hip noted on her last bone scan. Last Vitamin D level was measured in 2018 and was below desired levels.  As bone density scan was in April 2018.    Dyslipidemia  We follow her for dyslipidemia specifically elevated triglycerides from 200s-300s and elevated LDL.  She was able to get the LDL improved down to goal with her own efforts but not the triglycerides.  The last lipid panel was in April 2018 that came back the triglycerides were still high at 393, HDL 56 and LDL improved at 91.    We follow her for impaired fasting blood sugar.  So far her blood sugars have been normal since 2016 with her own efforts.  The last hemoglobin A1c was 5.4 in April 2018.    Screening for breast cancer  She is due for her next mammogram, last one completed 11/6/17.         Past medical history, past surgical history, family history reviewed-no changes    Social history reviewed-no changes    Allergies reviewed-no changes    Medications reviewed-no changes      ROS:  As per the HPI as shown above, the rest are negative.    Annual Health  "Assessment Questions:    1.  Are you currently engaging in any exercise or physical activity? No    2.  How would you describe your mood or emotional well-being today? anxious    3.  Have you had any falls in the last year? No    4.  Have you noticed any problems with your balance or had difficulty walking? No    5.  In the last six months have you experienced any leakage of urine? No    6. DPA/Advanced Directive: Patient has Advanced Directive on file.      Objective:     /100 (BP Location: Left arm, Patient Position: Sitting, BP Cuff Size: Adult)   Pulse 68   Temp 36.4 °C (97.6 °F) (Temporal)   Ht 1.651 m (5' 5\")   Wt 59.4 kg (130 lb 15.3 oz)   SpO2 96%   BMI 21.79 kg/m²     Physical Exam  Examined alert, awake, oriented, not in distress    Neck-supple, no lymphadenopathy, no thyromegaly  Lungs-clear to auscultation, no rales, no wheezes  Heart-regular rate and rhythm, no murmur  Extremities-no edema, clubbing, cyanosis    No visits with results within 1 Month(s) from this visit.   Latest known visit with results is:   Hospital Outpatient Visit on 04/13/2018   Component Date Value Ref Range Status   • Cholesterol,Tot 04/13/2018 226* 100 - 199 mg/dL Final   • Triglycerides 04/13/2018 393* 0 - 149 mg/dL Final   • HDL 04/13/2018 56  >=40 mg/dL Final   • LDL 04/13/2018 91  <100 mg/dL Final   • Sodium 04/13/2018 141  135 - 145 mmol/L Final   • Potassium 04/13/2018 3.9  3.6 - 5.5 mmol/L Final   • Chloride 04/13/2018 102  96 - 112 mmol/L Final   • Co2 04/13/2018 28  20 - 33 mmol/L Final   • Anion Gap 04/13/2018 11.0  0.0 - 11.9 Final   • Glucose 04/13/2018 86  65 - 99 mg/dL Final   • Bun 04/13/2018 15  8 - 22 mg/dL Final   • Creatinine 04/13/2018 0.86  0.50 - 1.40 mg/dL Final   • Calcium 04/13/2018 9.8  8.5 - 10.5 mg/dL Final   • AST(SGOT) 04/13/2018 21  12 - 45 U/L Final   • ALT(SGPT) 04/13/2018 18  2 - 50 U/L Final   • Alkaline Phosphatase 04/13/2018 63  30 - 99 U/L Final   • Total Bilirubin 04/13/2018 0.8  " 0.1 - 1.5 mg/dL Final   • Albumin 04/13/2018 4.4  3.2 - 4.9 g/dL Final   • Total Protein 04/13/2018 7.6  6.0 - 8.2 g/dL Final   • Globulin 04/13/2018 3.2  1.9 - 3.5 g/dL Final   • A-G Ratio 04/13/2018 1.4  g/dL Final   • Glycohemoglobin 04/13/2018 5.4  0.0 - 5.6 % Final   • Est Avg Glucose 04/13/2018 108  mg/dL Final   • WBC 04/13/2018 7.7  4.8 - 10.8 K/uL Final   • RBC 04/13/2018 4.78  4.20 - 5.40 M/uL Final   • Hemoglobin 04/13/2018 15.5  12.0 - 16.0 g/dL Final   • Hematocrit 04/13/2018 47.8* 37.0 - 47.0 % Final   • MCV 04/13/2018 100.0* 81.4 - 97.8 fL Final   • MCH 04/13/2018 32.4  27.0 - 33.0 pg Final   • MCHC 04/13/2018 32.4* 33.6 - 35.0 g/dL Final   • RDW 04/13/2018 45.8  35.9 - 50.0 fL Final   • Platelet Count 04/13/2018 217  164 - 446 K/uL Final   • MPV 04/13/2018 11.7  9.0 - 12.9 fL Final   • Neutrophils-Polys 04/13/2018 62.30  44.00 - 72.00 % Final   • Lymphocytes 04/13/2018 27.20  22.00 - 41.00 % Final   • Monocytes 04/13/2018 7.70  0.00 - 13.40 % Final   • Eosinophils 04/13/2018 1.90  0.00 - 6.90 % Final   • Basophils 04/13/2018 0.80  0.00 - 1.80 % Final   • Immature Granulocytes 04/13/2018 0.10  0.00 - 0.90 % Final   • Nucleated RBC 04/13/2018 0.00  /100 WBC Final   • Neutrophils (Absolute) 04/13/2018 4.80  2.00 - 7.15 K/uL Final   • Lymphs (Absolute) 04/13/2018 2.10  1.00 - 4.80 K/uL Final   • Monos (Absolute) 04/13/2018 0.59  0.00 - 0.85 K/uL Final   • Eos (Absolute) 04/13/2018 0.15  0.00 - 0.51 K/uL Final   • Baso (Absolute) 04/13/2018 0.06  0.00 - 0.12 K/uL Final   • Immature Granulocytes (abs) 04/13/2018 0.01  0.00 - 0.11 K/uL Final   • NRBC (Absolute) 04/13/2018 0.00  K/uL Final   • 25-Hydroxy   Vitamin D 25 04/13/2018 26* 30 - 100 ng/mL Final   • GFR If  04/13/2018 >60  >60 mL/min/1.73 m 2 Final   • GFR If Non  04/13/2018 >60  >60 mL/min/1.73 m 2 Final           Assessment/Plan:   1. White coat syndrome without hypertension  Home blood pressure readings are running  good and under control but high in the office.  Most likely she has whitecoat hypertension.  Today in the morning blood pressure at home was elevated and the same as the reading in the office.  She will monitor blood pressure at home and if they are persistently elevated 150/90 or higher she will let me know and we will discuss the need for medication.  Advised to avoid salty foods, exercise regularly and keep a healthy weight.  - Lipid Profile; Future  - Comp Metabolic Panel; Future  - CBC WITH DIFFERENTIAL; Future  - VITAMIN D,25 HYDROXY; Future  - HEMOGLOBIN A1C; Future    2. Dyslipidemia  Continuing to monitor this with repeat blood work ordered for next month. Patient will continue to monitor her diet, with avoidance of sweets, decreasing the carbs, avoidance of fatty foods, regular exercise and keeping a healthy weight.    - Lipid Profile; Future  - Comp Metabolic Panel; Future  - CBC WITH DIFFERENTIAL; Future  - VITAMIN D,25 HYDROXY; Future  - HEMOGLOBIN A1C; Future    3. Impaired fasting blood sugar  Will continue to monitor this with lab work ordered for next month.  She will continue to manage this with her own efforts.  - Lipid Profile; Future  - Comp Metabolic Panel; Future  - CBC WITH DIFFERENTIAL; Future  - VITAMIN D,25 HYDROXY; Future  - HEMOGLOBIN A1C; Future    4. Vitamin D deficiency  Continue supplementation. Will re-evaluate Vitamin D levels with lab work next month.  - Lipid Profile; Future  - Comp Metabolic Panel; Future  - CBC WITH DIFFERENTIAL; Future  - VITAMIN D,25 HYDROXY; Future  - HEMOGLOBIN A1C; Future    5. Osteopenia of multiple sites  Continue Vitamin D and calcium supplements.  Advised regular exercise especially weightbearing exercises.  - Lipid Profile; Future  - Comp Metabolic Panel; Future  - CBC WITH DIFFERENTIAL; Future  - VITAMIN D,25 HYDROXY; Future  - HEMOGLOBIN A1C; Future    6. Screening for breast cancer  Patient will schedule screening mammogram.  - MA-SCREEN MAMMO  W/CAD-BILAT; Future      I, Celia Morrissey (Scribe), am scribing for, and in the presence of, Yas Phelps MD     Electronically signed by: Celia Morrissey (Scribe), 3/6/2019    I, Yas Phelps MD personally performed the services described in this documentation, as scribed by Celia Morrissey in my presence, and it is both accurate and complete.

## 2019-03-15 ENCOUNTER — HOSPITAL ENCOUNTER (OUTPATIENT)
Dept: RADIOLOGY | Facility: MEDICAL CENTER | Age: 74
End: 2019-03-15
Attending: FAMILY MEDICINE
Payer: MEDICARE

## 2019-03-15 DIAGNOSIS — Z12.39 SCREENING FOR BREAST CANCER: ICD-10-CM

## 2019-03-15 PROCEDURE — 77063 BREAST TOMOSYNTHESIS BI: CPT

## 2019-04-03 ENCOUNTER — HOSPITAL ENCOUNTER (OUTPATIENT)
Dept: LAB | Facility: MEDICAL CENTER | Age: 74
End: 2019-04-03
Attending: FAMILY MEDICINE
Payer: MEDICARE

## 2019-04-03 DIAGNOSIS — E78.5 DYSLIPIDEMIA: ICD-10-CM

## 2019-04-03 DIAGNOSIS — M85.89 OSTEOPENIA OF MULTIPLE SITES: ICD-10-CM

## 2019-04-03 DIAGNOSIS — E55.9 VITAMIN D DEFICIENCY: ICD-10-CM

## 2019-04-03 DIAGNOSIS — R03.0 WHITE COAT SYNDROME WITHOUT HYPERTENSION: ICD-10-CM

## 2019-04-03 DIAGNOSIS — R73.01 IMPAIRED FASTING BLOOD SUGAR: ICD-10-CM

## 2019-04-03 LAB
25(OH)D3 SERPL-MCNC: 18 NG/ML (ref 30–100)
ALBUMIN SERPL BCP-MCNC: 4.3 G/DL (ref 3.2–4.9)
ALBUMIN/GLOB SERPL: 1.5 G/DL
ALP SERPL-CCNC: 63 U/L (ref 30–99)
ALT SERPL-CCNC: 24 U/L (ref 2–50)
ANION GAP SERPL CALC-SCNC: 8 MMOL/L (ref 0–11.9)
AST SERPL-CCNC: 21 U/L (ref 12–45)
BASOPHILS # BLD AUTO: 1.1 % (ref 0–1.8)
BASOPHILS # BLD: 0.09 K/UL (ref 0–0.12)
BILIRUB SERPL-MCNC: 0.6 MG/DL (ref 0.1–1.5)
BUN SERPL-MCNC: 16 MG/DL (ref 8–22)
CALCIUM SERPL-MCNC: 9.6 MG/DL (ref 8.5–10.5)
CHLORIDE SERPL-SCNC: 103 MMOL/L (ref 96–112)
CHOLEST SERPL-MCNC: 225 MG/DL (ref 100–199)
CO2 SERPL-SCNC: 28 MMOL/L (ref 20–33)
CREAT SERPL-MCNC: 0.75 MG/DL (ref 0.5–1.4)
EOSINOPHIL # BLD AUTO: 0.17 K/UL (ref 0–0.51)
EOSINOPHIL NFR BLD: 2.1 % (ref 0–6.9)
ERYTHROCYTE [DISTWIDTH] IN BLOOD BY AUTOMATED COUNT: 45.1 FL (ref 35.9–50)
EST. AVERAGE GLUCOSE BLD GHB EST-MCNC: 117 MG/DL
GLOBULIN SER CALC-MCNC: 2.8 G/DL (ref 1.9–3.5)
GLUCOSE SERPL-MCNC: 96 MG/DL (ref 65–99)
HBA1C MFR BLD: 5.7 % (ref 0–5.6)
HCT VFR BLD AUTO: 48.3 % (ref 37–47)
HDLC SERPL-MCNC: 60 MG/DL
HGB BLD-MCNC: 15.3 G/DL (ref 12–16)
IMM GRANULOCYTES # BLD AUTO: 0.02 K/UL (ref 0–0.11)
IMM GRANULOCYTES NFR BLD AUTO: 0.2 % (ref 0–0.9)
LDLC SERPL CALC-MCNC: ABNORMAL MG/DL
LYMPHOCYTES # BLD AUTO: 2.33 K/UL (ref 1–4.8)
LYMPHOCYTES NFR BLD: 28.6 % (ref 22–41)
MCH RBC QN AUTO: 32 PG (ref 27–33)
MCHC RBC AUTO-ENTMCNC: 31.7 G/DL (ref 33.6–35)
MCV RBC AUTO: 101 FL (ref 81.4–97.8)
MONOCYTES # BLD AUTO: 0.56 K/UL (ref 0–0.85)
MONOCYTES NFR BLD AUTO: 6.9 % (ref 0–13.4)
NEUTROPHILS # BLD AUTO: 4.98 K/UL (ref 2–7.15)
NEUTROPHILS NFR BLD: 61.1 % (ref 44–72)
NRBC # BLD AUTO: 0 K/UL
NRBC BLD-RTO: 0 /100 WBC
PLATELET # BLD AUTO: 209 K/UL (ref 164–446)
PMV BLD AUTO: 11.9 FL (ref 9–12.9)
POTASSIUM SERPL-SCNC: 4.4 MMOL/L (ref 3.6–5.5)
PROT SERPL-MCNC: 7.1 G/DL (ref 6–8.2)
RBC # BLD AUTO: 4.78 M/UL (ref 4.2–5.4)
SODIUM SERPL-SCNC: 139 MMOL/L (ref 135–145)
TRIGL SERPL-MCNC: 402 MG/DL (ref 0–149)
WBC # BLD AUTO: 8.2 K/UL (ref 4.8–10.8)

## 2019-04-03 PROCEDURE — 85025 COMPLETE CBC W/AUTO DIFF WBC: CPT

## 2019-04-03 PROCEDURE — 80061 LIPID PANEL: CPT

## 2019-04-03 PROCEDURE — 36415 COLL VENOUS BLD VENIPUNCTURE: CPT

## 2019-04-03 PROCEDURE — 83036 HEMOGLOBIN GLYCOSYLATED A1C: CPT

## 2019-04-03 PROCEDURE — 82306 VITAMIN D 25 HYDROXY: CPT

## 2019-04-03 PROCEDURE — 80053 COMPREHEN METABOLIC PANEL: CPT

## 2019-04-03 RX ORDER — ERGOCALCIFEROL 1.25 MG/1
50000 CAPSULE ORAL
Qty: 12 CAP | Refills: 0 | Status: SHIPPED | OUTPATIENT
Start: 2019-04-03 | End: 2020-03-16

## 2019-04-04 DIAGNOSIS — E78.1 HYPERTRIGLYCERIDEMIA: ICD-10-CM

## 2019-04-04 RX ORDER — FENOFIBRATE 48 MG/1
48 TABLET, COATED ORAL DAILY
Qty: 90 TAB | Refills: 1 | Status: SHIPPED | OUTPATIENT
Start: 2019-04-04 | End: 2020-03-16

## 2019-09-16 ENCOUNTER — APPOINTMENT (OUTPATIENT)
Dept: RADIOLOGY | Facility: MEDICAL CENTER | Age: 74
End: 2019-09-16
Attending: EMERGENCY MEDICINE
Payer: MEDICARE

## 2019-09-16 ENCOUNTER — HOSPITAL ENCOUNTER (EMERGENCY)
Facility: MEDICAL CENTER | Age: 74
End: 2019-09-16
Attending: EMERGENCY MEDICINE
Payer: MEDICARE

## 2019-09-16 ENCOUNTER — OFFICE VISIT (OUTPATIENT)
Dept: URGENT CARE | Facility: PHYSICIAN GROUP | Age: 74
End: 2019-09-16
Payer: MEDICARE

## 2019-09-16 VITALS
RESPIRATION RATE: 16 BRPM | OXYGEN SATURATION: 96 % | HEIGHT: 62 IN | TEMPERATURE: 98.9 F | SYSTOLIC BLOOD PRESSURE: 127 MMHG | BODY MASS INDEX: 23.74 KG/M2 | HEART RATE: 63 BPM | DIASTOLIC BLOOD PRESSURE: 64 MMHG | WEIGHT: 129 LBS

## 2019-09-16 VITALS
BODY MASS INDEX: 25.52 KG/M2 | DIASTOLIC BLOOD PRESSURE: 90 MMHG | WEIGHT: 130 LBS | HEIGHT: 60 IN | OXYGEN SATURATION: 97 % | TEMPERATURE: 97.6 F | HEART RATE: 53 BPM | SYSTOLIC BLOOD PRESSURE: 180 MMHG

## 2019-09-16 DIAGNOSIS — I16.1 HYPERTENSIVE EMERGENCY: ICD-10-CM

## 2019-09-16 DIAGNOSIS — N39.0 ACUTE URINARY TRACT INFECTION: ICD-10-CM

## 2019-09-16 DIAGNOSIS — R11.0 NAUSEA: ICD-10-CM

## 2019-09-16 DIAGNOSIS — I48.92 ATRIAL FLUTTER, UNSPECIFIED TYPE (HCC): ICD-10-CM

## 2019-09-16 LAB
ALBUMIN SERPL BCP-MCNC: 4.6 G/DL (ref 3.2–4.9)
ALBUMIN/GLOB SERPL: 1.5 G/DL
ALP SERPL-CCNC: 56 U/L (ref 30–99)
ALT SERPL-CCNC: 14 U/L (ref 2–50)
ANION GAP SERPL CALC-SCNC: 10 MMOL/L (ref 0–11.9)
APPEARANCE UR: ABNORMAL
AST SERPL-CCNC: 23 U/L (ref 12–45)
BACTERIA #/AREA URNS HPF: NEGATIVE /HPF
BASOPHILS # BLD AUTO: 0.9 % (ref 0–1.8)
BASOPHILS # BLD: 0.09 K/UL (ref 0–0.12)
BILIRUB SERPL-MCNC: 0.5 MG/DL (ref 0.1–1.5)
BILIRUB UR QL STRIP.AUTO: NEGATIVE
BUN SERPL-MCNC: 15 MG/DL (ref 8–22)
CALCIUM SERPL-MCNC: 10 MG/DL (ref 8.5–10.5)
CHLORIDE SERPL-SCNC: 100 MMOL/L (ref 96–112)
CO2 SERPL-SCNC: 28 MMOL/L (ref 20–33)
COLOR UR: YELLOW
CREAT SERPL-MCNC: 0.93 MG/DL (ref 0.5–1.4)
EKG IMPRESSION: NORMAL
EKG IMPRESSION: NORMAL
EOSINOPHIL # BLD AUTO: 0.05 K/UL (ref 0–0.51)
EOSINOPHIL NFR BLD: 0.5 % (ref 0–6.9)
EPI CELLS #/AREA URNS HPF: ABNORMAL /HPF
ERYTHROCYTE [DISTWIDTH] IN BLOOD BY AUTOMATED COUNT: 45.6 FL (ref 35.9–50)
GLOBULIN SER CALC-MCNC: 3.1 G/DL (ref 1.9–3.5)
GLUCOSE SERPL-MCNC: 132 MG/DL (ref 65–99)
GLUCOSE UR STRIP.AUTO-MCNC: NEGATIVE MG/DL
HCT VFR BLD AUTO: 47.8 % (ref 37–47)
HGB BLD-MCNC: 15.3 G/DL (ref 12–16)
HYALINE CASTS #/AREA URNS LPF: ABNORMAL /LPF
IMM GRANULOCYTES # BLD AUTO: 0.04 K/UL (ref 0–0.11)
IMM GRANULOCYTES NFR BLD AUTO: 0.4 % (ref 0–0.9)
KETONES UR STRIP.AUTO-MCNC: NEGATIVE MG/DL
LEUKOCYTE ESTERASE UR QL STRIP.AUTO: ABNORMAL
LYMPHOCYTES # BLD AUTO: 1.89 K/UL (ref 1–4.8)
LYMPHOCYTES NFR BLD: 19.4 % (ref 22–41)
MCH RBC QN AUTO: 31.2 PG (ref 27–33)
MCHC RBC AUTO-ENTMCNC: 32 G/DL (ref 33.6–35)
MCV RBC AUTO: 97.6 FL (ref 81.4–97.8)
MICRO URNS: ABNORMAL
MONOCYTES # BLD AUTO: 0.59 K/UL (ref 0–0.85)
MONOCYTES NFR BLD AUTO: 6 % (ref 0–13.4)
NEUTROPHILS # BLD AUTO: 7.1 K/UL (ref 2–7.15)
NEUTROPHILS NFR BLD: 72.8 % (ref 44–72)
NITRITE UR QL STRIP.AUTO: NEGATIVE
NRBC # BLD AUTO: 0 K/UL
NRBC BLD-RTO: 0 /100 WBC
PH UR STRIP.AUTO: 7 [PH] (ref 5–8)
PLATELET # BLD AUTO: 229 K/UL (ref 164–446)
PMV BLD AUTO: 11.9 FL (ref 9–12.9)
POTASSIUM SERPL-SCNC: 3.8 MMOL/L (ref 3.6–5.5)
PROT SERPL-MCNC: 7.7 G/DL (ref 6–8.2)
PROT UR QL STRIP: NEGATIVE MG/DL
RBC # BLD AUTO: 4.9 M/UL (ref 4.2–5.4)
RBC # URNS HPF: ABNORMAL /HPF
RBC UR QL AUTO: NEGATIVE
SODIUM SERPL-SCNC: 138 MMOL/L (ref 135–145)
SP GR UR STRIP.AUTO: 1.01
TROPONIN T SERPL-MCNC: 9 NG/L (ref 6–19)
UROBILINOGEN UR STRIP.AUTO-MCNC: 0.2 MG/DL
WBC # BLD AUTO: 9.8 K/UL (ref 4.8–10.8)
WBC #/AREA URNS HPF: ABNORMAL /HPF

## 2019-09-16 PROCEDURE — 81001 URINALYSIS AUTO W/SCOPE: CPT

## 2019-09-16 PROCEDURE — 71045 X-RAY EXAM CHEST 1 VIEW: CPT

## 2019-09-16 PROCEDURE — 87086 URINE CULTURE/COLONY COUNT: CPT

## 2019-09-16 PROCEDURE — 36415 COLL VENOUS BLD VENIPUNCTURE: CPT

## 2019-09-16 PROCEDURE — 93005 ELECTROCARDIOGRAM TRACING: CPT | Performed by: EMERGENCY MEDICINE

## 2019-09-16 PROCEDURE — 84484 ASSAY OF TROPONIN QUANT: CPT

## 2019-09-16 PROCEDURE — 85025 COMPLETE CBC W/AUTO DIFF WBC: CPT

## 2019-09-16 PROCEDURE — 93005 ELECTROCARDIOGRAM TRACING: CPT

## 2019-09-16 PROCEDURE — 80053 COMPREHEN METABOLIC PANEL: CPT

## 2019-09-16 PROCEDURE — 99284 EMERGENCY DEPT VISIT MOD MDM: CPT

## 2019-09-16 PROCEDURE — 87077 CULTURE AEROBIC IDENTIFY: CPT

## 2019-09-16 PROCEDURE — 99214 OFFICE O/P EST MOD 30 MIN: CPT | Performed by: FAMILY MEDICINE

## 2019-09-16 RX ORDER — NITROFURANTOIN 25; 75 MG/1; MG/1
100 CAPSULE ORAL 2 TIMES DAILY
Qty: 10 CAP | Refills: 0 | Status: SHIPPED | OUTPATIENT
Start: 2019-09-16 | End: 2019-09-21

## 2019-09-16 NOTE — ED PROVIDER NOTES
ED Provider Note    Scribed for Michael Sales M.D. by Michael Sales. 9/16/2019,  4:28 PM.    CHIEF COMPLAINT  Chief Complaint   Patient presents with   • Dizziness     acute onset at lunch at 1300   • Nausea       HPI  Karla Prince is a 74 y.o. female who presents to the Emergency Department after being seen at urgent care because of a 60 to 90-minute episode of dizziness and nausea which started at lunch, around 1 PM.  It is now 4:30 PM.  She denies any loss of consciousness, but says that her skin was clammy and she felt nauseated and had some transient blurry vision.  She denies chest pain or shortness of breath or any numbness or weakness in her extremities.  She is nearly completely back to baseline now, reporting only some very slight lightheadedness.  She was seen in urgent care, where apparently they were concerned about her EKG, and called EMS for transfer.  She has no previous EKGs at urgent care, so He said that there were EKG changes, though her EKG here shows inferior Q waves.  We also have no previous EKGs for comparison.  She denies having chest pain or shortness of breath or any exertional symptoms today or at any time in the recent past.  She has not had fevers or chills or vomiting.  She was feeling nauseated.  She denies any dysuria.  She has not had any recent illness including constipation or diarrhea or abdominal pain.  She takes no daily medications other than calcium for osteoporosis.  She has been noted in the past to have dyslipidemia and impaired fasting blood sugar.  She is not known to be a diabetic.  At the bedside now, she is calm and cooperative, and says that she feels completely back to normal as we talked more.      REVIEW OF SYSTEMS  See HPI for further details. All other systems are negative.     PAST MEDICAL HISTORY   has a past medical history of Dyslipidemia, Impaired fasting blood sugar, Osteoporosis (3/16), and Osteoporosis.    SOCIAL HISTORY  Social History  "    Tobacco Use   • Smoking status: Former Smoker     Packs/day: 0.25     Years: 52.00     Pack years: 13.00     Types: Cigarettes   • Smokeless tobacco: Never Used   • Tobacco comment: dec from 1/2 ppd to 1/4 ppd since 2/16   Substance and Sexual Activity   • Alcohol use: Not Currently     Alcohol/week: 0.0 oz     Comment: glass of wine with dinner   • Drug use: No   • Sexual activity: Yes     Partners: Male     Social History     Substance and Sexual Activity   Drug Use No       SURGICAL HISTORY   has a past surgical history that includes hysterectomy, total abdominal and colonoscopy (05/09/2018).    CURRENT MEDICATIONS  Home Medications     Reviewed by Peace Steen R.N. (Registered Nurse) on 09/16/19 at 1554  Med List Status: <None>   Medication Last Dose Status   Calcium Citrate (CITRACAL PO)  Active   fenofibrate (TRICOR) 48 MG Tab  Active   vitamin D (CHOLECALCIFEROL) 1000 UNIT Tab  Active   vitamin D, Ergocalciferol, (DRISDOL) 02364 units Cap capsule  Active                ALLERGIES  No Known Allergies    PHYSICAL EXAM  VITAL SIGNS: /64   Pulse 63   Temp 37.2 °C (98.9 °F) (Temporal)   Resp 16   Ht 1.575 m (5' 2\")   Wt 58.5 kg (129 lb)   SpO2 96%   BMI 23.59 kg/m²   Pulse ox interpretation: I interpret this pulse ox as normal.  Constitutional: Alert in no apparent distress.  HENT: No signs of trauma, Bilateral external ears normal, Nose normal.   Eyes: Conjunctiva normal, Non-icteric.   Neck: Normal range of motion, Supple, No stridor.   Lymphatic: No lymphadenopathy noted.   Cardiovascular: Regular rate and rhythm, no murmurs.   Thorax & Lungs: Normal breath sounds, No respiratory distress, No wheezing, No chest tenderness.   Abdomen: Bowel sounds normal, Soft, No tenderness, No masses, No pulsatile masses. No peritoneal signs.  Skin: Warm, Dry, No erythema, No rash.   Back: No midline bony tenderness.   Extremities: Intact distal pulses, No edema, No cyanosis.  Musculoskeletal: Good range of " motion in all major joints. No or major deformities noted.   Neurologic: Alert , Normal motor function, Normal sensory function, No focal deficits noted.   Psychiatric: Affect normal, Judgment normal, Mood normal.     DIAGNOSTIC STUDIES / PROCEDURES    EKG  Interpreted by me    Rhythm:  Normal sinus rhythm   Rate: 64  Borderline intraventricular conduction delay  Abnormal inferior Q waves  No previous ECG available for comparison    EKG #2:   Interpreted by me    Rhythm:  Normal sinus rhythm   Rate: 61  Nonspecific intraventricular conduction delay  Abnormal inferior Q waves  Compared to ECG 09/16/2019 16:05:17  No significant changes      LABS  Labs Reviewed   CBC WITH DIFFERENTIAL - Abnormal; Notable for the following components:       Result Value    Hematocrit 47.8 (*)     MCHC 32.0 (*)     Neutrophils-Polys 72.80 (*)     Lymphocytes 19.40 (*)     All other components within normal limits   COMP METABOLIC PANEL - Abnormal; Notable for the following components:    Glucose 132 (*)     All other components within normal limits   URINALYSIS,CULTURE IF INDICATED - Abnormal; Notable for the following components:    Character Cloudy (*)     Leukocyte Esterase Moderate (*)     All other components within normal limits   ESTIMATED GFR - Abnormal; Notable for the following components:    GFR If Non  59 (*)     All other components within normal limits   URINE MICROSCOPIC (W/UA) - Abnormal; Notable for the following components:    WBC 10-20 (*)     All other components within normal limits   TROPONIN   URINE CULTURE(NEW)   TROPONIN     All labs reviewed by me.    RADIOLOGY  DX-CHEST-PORTABLE (1 VIEW)   Final Result      No acute cardiopulmonary abnormality.        The radiologist's interpretation of all radiological studies have been reviewed by me.    COURSE & MEDICAL DECISION MAKING  Nursing notes, VS, PMSFHx reviewed in chart.     4:28 PM Patient seen and examined at bedside. Differential diagnosis  includes but is not limited to vasovagal episode, acute coronary syndrome, hyper or hypoglycemia, electrolyte abnormality, urinary tract infection.  Given the fact that her symptoms started after eating, and she reports that years ago, she had a fainting episode that was very similar, with sudden onset of similar symptoms while eating a high carbohydrate meal, I think her differential favors vasovagal episode, though this is obviously a diagnosis of exclusion.  I have ordered screening laboratory tests, cardiac monitoring, and serial EKGs for evaluation.     I returned to the bedside after the patient's labs and serial EKGs were resulted.  She has remained asymptomatic and very pleasant.  We discussed the most likely cause of her symptoms was a vasovagal episode, though there is some evidence of urinary tract infection.  Despite her lack of dysuria, she has had abdominal symptoms, some near syncope, and I cannot guarantee that an emergent urinary tract infection is not contributing.  Thus, I think it is appropriate to treat her with a course of Macrobid.  We discussed supportive care at home for the next few days until she is certain she is feeling back to normal.  We also discussed very close return precautions for emergency department reevaluation.     The patient will return for new or worsening symptoms and is stable at the time of discharge.    The patient is referred to a primary physician for blood pressure management, diabetic screening, and for all other preventative health concerns.    DISPOSITION:  Patient will be discharged home in stable condition.    FOLLOW UP:  Yas Phelps M.D.  [address]  [place] [address] [phone]    Schedule an appointment as soon as possible for a visit       [facility], Emergency Dept  [address] [facility] [id number] [phone]    If symptoms worsen      OUTPATIENT MEDICATIONS:  Discharge Medication List as of 9/16/2019  5:48 PM       START taking these medications    Details   nitrofurantoin monohyd macro (MACROBID) 100 MG Cap Take 1 Cap by mouth 2 times a day for 5 days., Disp-10 Cap, R-0, Normal               FINAL IMPRESSION  1. Nausea    2. Acute urinary tract infection

## 2019-09-16 NOTE — ED TRIAGE NOTES
Pt BIB EMS from   Chief Complaint   Patient presents with   • Dizziness     acute onset at lunch at 1300   • Nausea   Pt reports 60-90 min episode.  No syncope, pt was clammy and nauseous with blurry vision.  Denies CP or stroke like symptoms  Pt noted that her BP was high during this episode.  Pt is almost symptom free at this time, slight lightheadedness    [facility] felt there were EKG changes and called EMS for transfer.

## 2019-09-16 NOTE — PROGRESS NOTES
Chief Complaint   Patient presents with   • Dizziness     xthis morning, nausea, lightheaded           HPI:      Pt states that she was eating lunch and felt acute onset of lightheadedness, dizziness, nausea, but no emesis.   She checked BP at home and it was 180/107.  She was brought to  by .   Denies chest pain or sob.   She has a hx of HTN, but trying to control it through diet alone.   No hx DM     States sx improved with laying supine.           Past Medical History:   Diagnosis Date   • Dyslipidemia    • Impaired fasting blood sugar    • Osteoporosis 3/16    DEXA scan-3/16   • Osteoporosis      Family History   Problem Relation Age of Onset   • Dementia Mother    • Diabetes Father    • Hyperlipidemia Sister    • Hyperlipidemia Brother      Social History     Tobacco Use   • Smoking status: Former Smoker     Packs/day: 0.25     Years: 52.00     Pack years: 13.00     Types: Cigarettes   • Smokeless tobacco: Never Used   • Tobacco comment: dec from 1/2 ppd to 1/4 ppd since 2/16   Substance Use Topics   • Alcohol use: Not Currently     Alcohol/week: 0.0 oz     Comment: glass of wine with dinner   • Drug use: No             Review of Systems   Constitutional: Negative for fever, chills and malaise/fatigue.   Eyes: Negative for vision changes, d/c.    Respiratory: Negative for cough and sputum production.    Cardiovascular: Negative for chest pain and palpitations.   Gastrointestinal: Negative for  vomiting, abdominal pain, diarrhea and constipation.   Genitourinary: Negative for dysuria, urgency and frequency.   Skin: Negative for rash or  itching.   Neurological: Negative for disorientation or tingling.   Psychiatric/Behavioral: Negative for depression.   Denies anxiety.   Hematologic/lymphatic - denies bruising or excessive bleeding  All other systems reviewed and are negative.      OBJECTIVE  BP (!) 180/90   Pulse (!) 53   Temp 36.4 °C (97.6 °F) (Temporal)   Ht 1.524 m (5')   Wt 59 kg (130 lb)    SpO2 97%   HEENT - PERRLA, EOMI  Neuro - alert and oriented x3. CN 2-12 grossly intact.  Lungs - CTA. No wheezes, rhonchi or rales.  Heart - regular rate and rhythm without murmur.  Abdomen - soft and non-tender, bowel sounds active x4.  Musculoskeletal - No lower extremity edema noted.  Alan's sign negative bilaterally      EKG interpretation - A-flutter.   Minimal ST depression in lat leads.      A/P:    Hypertensive emergency    EKG shows possible atrial flutter and ST depression in lateral leads.     She will require a higher level of care    Will transport [facility] ED via ambulance

## 2019-09-17 NOTE — DISCHARGE INSTRUCTIONS
Your evaluation here was very reassuring, although there is some evidence of urinary tract infection.  This can cause some nausea and lightheadedness and may be contributing to your symptoms.  It is also possible that your symptoms were related to the active eating itself.  Although you did not pass out, we have included some information about vasovagal episodes.  Please fill your prescription for antibiotics and take them as directed.  Schedule follow-up with your primary care doctor, and return here for worsening or severe symptoms that cannot be managed by your regular doctor.

## 2019-09-18 LAB
BACTERIA UR CULT: ABNORMAL
BACTERIA UR CULT: ABNORMAL
SIGNIFICANT IND 70042: ABNORMAL
SITE SITE: ABNORMAL
SOURCE SOURCE: ABNORMAL

## 2019-09-19 NOTE — ED NOTES
"ED Positive Culture Follow-up/Notification Note:    Date: 9/18/19     Patient seen in the ED on 9/16/2019 for dizziness and nausea.   1. Nausea    2. Acute urinary tract infection       Discharge Medication List as of 9/16/2019  5:48 PM      START taking these medications    Details   nitrofurantoin monohyd macro (MACROBID) 100 MG Cap Take 1 Cap by mouth 2 times a day for 5 days., Disp-10 Cap, R-0, Normal             Allergies: Patient has no known allergies.     Vitals:    09/16/19 1551 09/16/19 1601 09/16/19 1630 09/16/19 1731   BP: 150/68 (!) 138/36 146/53 127/64   Pulse: 64 63 61 63   Resp: 16      Temp: 37.2 °C (98.9 °F)      TempSrc: Temporal      SpO2:  99% 97% 96%   Weight: 58.5 kg (129 lb)      Height: 1.575 m (5' 2\")          Final cultures:   Results     Procedure Component Value Units Date/Time    URINE CULTURE(NEW) [[id number]]  (Abnormal) Collected:  09/16/19 1720    Order Status:  Completed Specimen:  Urine Updated:  09/18/19 0737     Significant Indicator POS     Source UR     Site -     Culture Result Mixed skin geremias ,000 cfu/mL      Streptococcus agalactiae (Group B)  ,000 cfu/mL      URINALYSIS,CULTURE IF INDICATED [[id number]]  (Abnormal) Collected:  09/16/19 1720    Order Status:  Completed Specimen:  Urine Updated:  09/16/19 1743     Color Yellow     Character Cloudy     Specific Gravity 1.008     Ph 7.0     Glucose Negative mg/dL      Ketones Negative mg/dL      Protein Negative mg/dL      Bilirubin Negative     Urobilinogen, Urine 0.2     Nitrite Negative     Leukocyte Esterase Moderate     Occult Blood Negative     Micro Urine Req Microscopic          Plan:   Group B Strep in the urine is a common urogenital colonizer and not likely a true urinary pathogen. Nitrofurantoin will provide good empiric coverage against urinary pathogens. No changes based on C&S data.       Mariola Barksdale    "

## 2020-01-27 ENCOUNTER — PATIENT OUTREACH (OUTPATIENT)
Dept: HEALTH INFORMATION MANAGEMENT | Facility: OTHER | Age: 75
End: 2020-01-27

## 2020-01-27 NOTE — PROGRESS NOTES
Patients  called because he wanted to speak to someone in [facility] to change their plan. I informed him the plan should take effect on February 1st, 2020.

## 2020-03-12 ENCOUNTER — TELEPHONE (OUTPATIENT)
Dept: MEDICAL GROUP | Facility: PHYSICIAN GROUP | Age: 75
End: 2020-03-12

## 2020-03-12 NOTE — TELEPHONE ENCOUNTER
ESTABLISHED PATIENT PRE-VISIT PLANNING     Patient was NOT contacted to complete PVP.      1.  Reviewed notes from the last few office visits within the medical group: Yes    2.  If any orders were placed at last visit or intended to be done for this visit (i.e. 6 mos follow-up), do we have Results/Consult Notes?        •  Labs - Labs ordered, NOT completed. Patient advised to complete prior to next appointment.         •  Imaging - Imaging ordered, completed and results are in chart.       •  Referrals - No referrals were ordered at last office visit.    3. Is this appointment scheduled as a Hospital Follow-Up? No    4.  Immunizations were updated in Epic using WebIZ?: Epic matches WebIZ       •  Web Iz Recommendations: PNEUMOVAX (PPSV23), TD, VARICELLA (Chicken Pox)  and SHINGRIX (Shingles)    5.  Patient is due for the following Health Maintenance Topics:   Health Maintenance Due   Topic Date Due   • HEPATITIS C SCREENING  1945   • IMM ZOSTER VACCINES (2 of 3) 12/07/2009   • IMM PNEUMOCOCCAL VACCINE: 65+ Years (2 of 2 - PPSV23) 02/23/2017   • Annual Wellness Visit  04/12/2019   • IMM INFLUENZA (1) 09/01/2019   • MAMMOGRAM  03/15/2020       - Patient plans to schedule appointment for Annual Wellness Visit (AWV) and Immunizations: PNEUMOVAX (PPSV23).    6. Orders for overdue Health Maintenance topics pended in Pre-Charting? NO    7.  AHA (MDX) form printed for Provider? YES    8.  Patient was NOT informed to arrive 15 min prior to their scheduled appointment and bring in their medication bottles.

## 2020-03-16 ENCOUNTER — OFFICE VISIT (OUTPATIENT)
Dept: MEDICAL GROUP | Facility: PHYSICIAN GROUP | Age: 75
End: 2020-03-16
Payer: MEDICARE

## 2020-03-16 VITALS
TEMPERATURE: 97.3 F | HEIGHT: 60 IN | DIASTOLIC BLOOD PRESSURE: 80 MMHG | WEIGHT: 132.72 LBS | OXYGEN SATURATION: 97 % | SYSTOLIC BLOOD PRESSURE: 166 MMHG | HEART RATE: 74 BPM | BODY MASS INDEX: 26.06 KG/M2

## 2020-03-16 DIAGNOSIS — M72.0 DUPUYTREN'S DISEASE OF PALM OF LEFT HAND: ICD-10-CM

## 2020-03-16 DIAGNOSIS — R73.01 IMPAIRED FASTING BLOOD SUGAR: ICD-10-CM

## 2020-03-16 DIAGNOSIS — E78.5 DYSLIPIDEMIA: ICD-10-CM

## 2020-03-16 DIAGNOSIS — M85.89 OSTEOPENIA OF MULTIPLE SITES: ICD-10-CM

## 2020-03-16 DIAGNOSIS — Z11.59 NEED FOR HEPATITIS C SCREENING TEST: ICD-10-CM

## 2020-03-16 DIAGNOSIS — E55.9 VITAMIN D DEFICIENCY: ICD-10-CM

## 2020-03-16 DIAGNOSIS — R03.0 WHITE COAT SYNDROME WITHOUT HYPERTENSION: ICD-10-CM

## 2020-03-16 PROCEDURE — 8041 PR SCP AHA: Performed by: FAMILY MEDICINE

## 2020-03-16 PROCEDURE — 99214 OFFICE O/P EST MOD 30 MIN: CPT | Performed by: FAMILY MEDICINE

## 2020-03-16 ASSESSMENT — PATIENT HEALTH QUESTIONNAIRE - PHQ9: CLINICAL INTERPRETATION OF PHQ2 SCORE: 0

## 2020-03-16 ASSESSMENT — FIBROSIS 4 INDEX: FIB4 SCORE: 1.93

## 2020-03-16 NOTE — PROGRESS NOTES
Subjective:      Karla Prince is a 74 y.o. female who presents with Hyperlipidemia        HPI:    The patient is here for follow up on her chronic medical problems and for AHA.    White coat syndrome without hypertension  Patient is here today for follow up of her blood pressure. She reports she monitors her blood pressure at home, and readings were measured to be 140/76 two days ago, and 141/77 yesterday. Today, blood pressure is 170/90. Upon recheck, blood pressure was 166/80. She denies any headache, dizziness, lightheadedness, chest pain, palpitations, shortness of breath, leg edema.  She would like to avoid medication if not necessary.    Dyslipidemia  Patient has been followed for elevated TG and elevated LDL. Last blood work was completed 4/3/19 which demonstrate elevated total cholesterol of 225, and elevated TG of 402, LDL not measured because of elevated triglycerides above 400.    Impaired fasting blood sugar  Most recent blood work in 9/16/19 demonstrates fasting blood glucose of 132. She continues to manage this through her own efforts.     Vitamin D deficiency  Blood work completed on 4/3/19 demonstrates level of 18.  At that time we treated her with high-dose vitamin D treatment for 3 months which she finished.  We advised her to take over-the-counter vitamin D 4000 international units daily as a maintenance dose after finishing the high-dose vitamin D treatment.  She said she is only taking vitamin D 1000 international units daily since then.      Osteopenia of multiple sites  Last DEXA scan in 2018 demonstrates osteopenia of hip and lumbar spine. Patient continues to take calcium ad vitamin D supplementation. She will have repeat DEXA scan next year.    Dupuytren's disease of palm of left hand.  Patient states she has had thick and hard spot on the palm of her left hand with full range of movement of the joints of the hands and no discomfort or pain.    Need for hepatitis C screening test  Patient  qualifies for hepatitis C screening based on CDC guidelines.    Of note, she was seen at Urgent Care in September of 2019 for dizziness and nausea and was sent to the ED for presumed atrial flutter. In the ED, they found no atrial flutter on EKG and she was in sinus rhythm. At the time, she was found to have UTI and was treated appropriately with resolution of her symptoms.    Annual Health Assessment Questions:     1.  Are you currently engaging in any exercise or physical activity? Yes     2.  How would you describe your mood or emotional well-being today? good     3.  Have you had any falls in the last year? No     4.  Have you noticed any problems with your balance or had difficulty walking? No     5.  In the last six months have you experienced any leakage of urine? No     6. DPA/Advanced Directive: Patient has Advanced Directive on file.     I reviewed the following    Past medical history, past surgical history, family history reviewed-no changes    Social history reviewed-no changes    Allergies reviewed-no changes    Medications reviewed-no changes     ROS:  As per the HPI as shown above, the rest are negative.       Objective:     BP (!) 166/80   Pulse 74   Temp 36.3 °C (97.3 °F) (Temporal)   Ht 1.524 m (5')   Wt 60.2 kg (132 lb 11.5 oz)   SpO2 97%   BMI 25.92 kg/m²     Physical Exam  Examined alert, awake, oriented, not in distress    Neck-supple, no lymphadenopathy, no thyromegaly  Lungs-clear to auscultation, no rales, no wheezes  Heart-regular rate and rhythm, no murmur  Extremities-Palmar tendon of the L middle finger is very prominent, thick and hard. But there is no limited ROM of left middle finger. No edema, clubbing, cyanosis     Labs:  Admission on 09/16/2019, Discharged on 09/16/2019   Component Date Value Ref Range Status   • Report 09/16/2019    Final                    Value:[facility].    Test Date:  2019-09-16  Pt Name:    ZAFAR TOMMY                  Department: ER  MRN:        [id number]                      Room:       [facility]  Gender:     Female                       Technician: [id number]  :        1945                   Requested By:ER TRIAGE PROTOCOL  Order #:    [id number]                    Reading MD: ART RICH MD    Measurements  Intervals                                Axis  Rate:       64                           P:          59  MI:         147                          QRS:        53  QRSD:       112                          T:          36  QT:         427  QTc:        441    Interpretive Statements  Sinus rhythm  Borderline intraventricular conduction delay  Abnormal inferior Q waves  No previous ECG available for comparison    Electronically Signed On 2019 16:31:00 PDT by ART RICH MD     • WBC 2019 9.8  4.8 - 10.8 K/uL Final   • RBC 2019 4.90  4.20 - 5.40 M/uL Final   • Hemoglobin 2019 15.3  12.0 - 16.0 g/dL Final   • Hematocrit 2019 47.8* 37.0 - 47.0 % Final   • MCV 2019 97.6  81.4 - 97.8 fL Final   • MCH 2019 31.2  27.0 - 33.0 pg Final   • MCHC 2019 32.0* 33.6 - 35.0 g/dL Final   • RDW 2019 45.6  35.9 - 50.0 fL Final   • Platelet Count 2019 229  164 - 446 K/uL Final   • MPV 2019 11.9  9.0 - 12.9 fL Final   • Neutrophils-Polys 2019 72.80* 44.00 - 72.00 % Final   • Lymphocytes 2019 19.40* 22.00 - 41.00 % Final   • Monocytes 2019 6.00  0.00 - 13.40 % Final   • Eosinophils 2019 0.50  0.00 - 6.90 % Final   • Basophils 2019 0.90  0.00 - 1.80 % Final   • Immature Granulocytes 2019 0.40  0.00 - 0.90 % Final   • Nucleated RBC 2019 0.00  /100 WBC Final   • Neutrophils (Absolute) 2019 7.10  2.00 - 7.15 K/uL Final    Includes immature neutrophils, if present.   • Lymphs (Absolute) 2019 1.89  1.00 - 4.80 K/uL Final   • Monos (Absolute) 2019 0.59  0.00 - 0.85 K/uL Final   • Eos (Absolute) 2019 0.05  0.00 - 0.51  K/uL Final   • Baso (Absolute) 2019 0.09  0.00 - 0.12 K/uL Final   • Immature Granulocytes (abs) 2019 0.04  0.00 - 0.11 K/uL Final   • NRBC (Absolute) 2019 0.00  K/uL Final   • Sodium 2019 138  135 - 145 mmol/L Final   • Potassium 2019 3.8  3.6 - 5.5 mmol/L Final    Specimen slightly hemolyzed.   • Chloride 2019 100  96 - 112 mmol/L Final   • Co2 2019 28  20 - 33 mmol/L Final   • Anion Gap 2019 10.0  0.0 - 11.9 Final   • Glucose 2019 132* 65 - 99 mg/dL Final   • Bun 2019 15  8 - 22 mg/dL Final   • Creatinine 2019 0.93  0.50 - 1.40 mg/dL Final   • Calcium 2019 10.0  8.5 - 10.5 mg/dL Final   • AST(SGOT) 2019 23  12 - 45 U/L Final   • ALT(SGPT) 2019 14  2 - 50 U/L Final   • Alkaline Phosphatase 2019 56  30 - 99 U/L Final   • Total Bilirubin 2019 0.5  0.1 - 1.5 mg/dL Final   • Albumin 2019 4.6  3.2 - 4.9 g/dL Final   • Total Protein 2019 7.7  6.0 - 8.2 g/dL Final   • Globulin 2019 3.1  1.9 - 3.5 g/dL Final   • A-G Ratio 2019 1.5  g/dL Final   • Troponin T 2019 9  6 - 19 ng/L Final    Comment: As of 2019 at 0900, the Troponin I test has been replaced with the  Ultra High Sensitivity (Gen 5) Troponin T test.  Please note new analyte,  methodology, and reference range.  The Ultra High Sensitivity Troponin T test has a reference range for  positive troponins that follows the recommendation of the American College  of Cardiology (ACC) committee in conjunction with the 99th percentile  reference population. Normal range: 6-19 ng/L     • Report 2019    Final                    Value:[facility].    Test Date:  2019  Pt Name:    ZAFAR SANDERS                 Department: ER  MRN:        [id number]                      Room:       RD 02  Gender:     Female                       Technician: [id number]  :        1945                   Requested By:ART  ARIAS RICH  Order #:    [id number]                    Reading MD: ART RICH MD    Measurements  Intervals                                Axis  Rate:       61                           P:          51  VA:         143                          QRS:        40  QRSD:       115                          T:          28  QT:         427  QTc:        430    Interpretive Statements  Sinus rhythm  Nonspecific intraventricular conduction delay  Abnormal inferior Q waves  Compared to ECG 09/16/2019 16:05:17  No significant changes    Electronically Signed On 9- 17:43:37 PDT by ART RICH MD     • Color 09/16/2019 Yellow   Final   • Character 09/16/2019 Cloudy*  Final   • Specific Gravity 09/16/2019 1.008  <1.035 Final   • Ph 09/16/2019 7.0  5.0 - 8.0 Final   • Glucose 09/16/2019 Negative  Negative mg/dL Final   • Ketones 09/16/2019 Negative  Negative mg/dL Final   • Protein 09/16/2019 Negative  Negative mg/dL Final   • Bilirubin 09/16/2019 Negative  Negative Final   • Urobilinogen, Urine 09/16/2019 0.2  Negative Final   • Nitrite 09/16/2019 Negative  Negative Final   • Leukocyte Esterase 09/16/2019 Moderate* Negative Final   • Occult Blood 09/16/2019 Negative  Negative Final   • Micro Urine Req 09/16/2019 Microscopic   Final   • GFR If  09/16/2019 >60  >60 mL/min/1.73 m 2 Final   • GFR If Non  09/16/2019 59* >60 mL/min/1.73 m 2 Final   • WBC 09/16/2019 10-20* /hpf Final    Comment: Female  <12 Yr 0-2  >12 Yr 0-5  Male   None     • RBC 09/16/2019 0-2  /hpf Final    Comment: Female  >12 Yr 0-2  Male   None     • Bacteria 09/16/2019 Negative  None /hpf Final   • Epithelial Cells 09/16/2019 Few  /hpf Final   • Hyaline Cast 09/16/2019 0-2  /lpf Final   • Significant Indicator 09/16/2019 POS*  Final   • Source 09/16/2019 UR   Final   • Site 09/16/2019 -   Final   • Culture Result 09/16/2019 Mixed skin geremias ,000 cfu/mL*  Final   • Culture Result 09/16/2019 *  Final                     Value:Streptococcus agalactiae (Group B)  ,000 cfu/mL     Hospital Outpatient Visit on 04/03/2019   Component Date Value Ref Range Status   • Cholesterol,Tot 04/03/2019 225* 100 - 199 mg/dL Final   • Triglycerides 04/03/2019 402* 0 - 149 mg/dL Final   • HDL 04/03/2019 60  >=40 mg/dL Final   • LDL 04/03/2019 see below  <100 mg/dL Final    Comment: The calculated LDL value is invalid due to the triglyceride  value of >400 mg/dL.     • Sodium 04/03/2019 139  135 - 145 mmol/L Final   • Potassium 04/03/2019 4.4  3.6 - 5.5 mmol/L Final   • Chloride 04/03/2019 103  96 - 112 mmol/L Final   • Co2 04/03/2019 28  20 - 33 mmol/L Final   • Anion Gap 04/03/2019 8.0  0.0 - 11.9 Final   • Glucose 04/03/2019 96  65 - 99 mg/dL Final   • Bun 04/03/2019 16  8 - 22 mg/dL Final   • Creatinine 04/03/2019 0.75  0.50 - 1.40 mg/dL Final   • Calcium 04/03/2019 9.6  8.5 - 10.5 mg/dL Final   • AST(SGOT) 04/03/2019 21  12 - 45 U/L Final   • ALT(SGPT) 04/03/2019 24  2 - 50 U/L Final   • Alkaline Phosphatase 04/03/2019 63  30 - 99 U/L Final   • Total Bilirubin 04/03/2019 0.6  0.1 - 1.5 mg/dL Final   • Albumin 04/03/2019 4.3  3.2 - 4.9 g/dL Final   • Total Protein 04/03/2019 7.1  6.0 - 8.2 g/dL Final   • Globulin 04/03/2019 2.8  1.9 - 3.5 g/dL Final   • A-G Ratio 04/03/2019 1.5  g/dL Final   • WBC 04/03/2019 8.2  4.8 - 10.8 K/uL Final   • RBC 04/03/2019 4.78  4.20 - 5.40 M/uL Final   • Hemoglobin 04/03/2019 15.3  12.0 - 16.0 g/dL Final   • Hematocrit 04/03/2019 48.3* 37.0 - 47.0 % Final   • MCV 04/03/2019 101.0* 81.4 - 97.8 fL Final   • MCH 04/03/2019 32.0  27.0 - 33.0 pg Final   • MCHC 04/03/2019 31.7* 33.6 - 35.0 g/dL Final   • RDW 04/03/2019 45.1  35.9 - 50.0 fL Final   • Platelet Count 04/03/2019 209  164 - 446 K/uL Final   • MPV 04/03/2019 11.9  9.0 - 12.9 fL Final   • Neutrophils-Polys 04/03/2019 61.10  44.00 - 72.00 % Final   • Lymphocytes 04/03/2019 28.60  22.00 - 41.00 % Final   • Monocytes 04/03/2019 6.90  0.00 - 13.40 %  Final   • Eosinophils 04/03/2019 2.10  0.00 - 6.90 % Final   • Basophils 04/03/2019 1.10  0.00 - 1.80 % Final   • Immature Granulocytes 04/03/2019 0.20  0.00 - 0.90 % Final   • Nucleated RBC 04/03/2019 0.00  /100 WBC Final   • Neutrophils (Absolute) 04/03/2019 4.98  2.00 - 7.15 K/uL Final    Includes immature neutrophils, if present.   • Lymphs (Absolute) 04/03/2019 2.33  1.00 - 4.80 K/uL Final   • Monos (Absolute) 04/03/2019 0.56  0.00 - 0.85 K/uL Final   • Eos (Absolute) 04/03/2019 0.17  0.00 - 0.51 K/uL Final   • Baso (Absolute) 04/03/2019 0.09  0.00 - 0.12 K/uL Final   • Immature Granulocytes (abs) 04/03/2019 0.02  0.00 - 0.11 K/uL Final   • NRBC (Absolute) 04/03/2019 0.00  K/uL Final   • 25-Hydroxy   Vitamin D 25 04/03/2019 18* 30 - 100 ng/mL Final    Comment: Adult Ranges:   <20 ng/mL - Deficiency  20-29 ng/mL - Insufficiency   ng/mL - Sufficiency  The [employer] Centaur Vitamin D Assay is standardized to the  [facility] reference measurement procedures, a  reference method for the Vitamin D Standardization Program  (VDSP).  The VDSP aligns patient results among 25 (OH)  Vitamin D methods.     • Glycohemoglobin 04/03/2019 5.7* 0.0 - 5.6 % Final    Comment: Increased risk for diabetes:  5.7 -6.4%  Diabetes:  >6.4%  Glycemic control for adults with diabetes:  <7.0%  The above interpretations are per ADA guidelines.  Diagnosis  of diabetes mellitus on the basis of elevated Hemoglobin A1c  should be confirmed by repeating the Hb A1c test.     • Est Avg Glucose 04/03/2019 117  mg/dL Final    Comment: The eAG calculation is based on the A1c-Derived Daily Glucose  (ADAG) study.  See the ADA's website for additional information.     • GFR If  04/03/2019 >60  >60 mL/min/1.73 m 2 Final   • GFR If Non  04/03/2019 >60  >60 mL/min/1.73 m 2 Final             Assessment/Plan:     1. White coat syndrome without hypertension  Patient states blood pressure readings at home are measured to  be 140/76 two days ago, and 141/77 yesterday at home, but are high in the office.  This is consistent with white coat hypertension.  We do not need to put her on medication.  She will continue to monitor blood pressure readings at home, and notify the office if they are persistently elevated and warrant the need for medications. Advised to avoid salty foods, exercise regularly and keep a healthy weight.  - Lipid Profile; Future  - Comp Metabolic Panel; Future  - HEMOGLOBIN A1C; Future  - CBC WITH DIFFERENTIAL; Future  - VITAMIN D,25 HYDROXY; Future  - HEP C VIRUS ANTIBODY; Future      2. Dyslipidemia  The last lipid panel in April 2019 came back total cholesterol 225, triglycerides 402, HDL 60 and LDL not measured because of triglycerides above 400.  Patient is managing this with her own efforts only.  We will do updated blood work as soon as possible.  Advised to work harder on avoidance of sweets, decreasing the carbs to improve the triglycerides, avoid fatty foods to improve the LDL cholesterol.  I will inform her of results and consider treatment depending on results.  - Lipid Profile; Future  - Comp Metabolic Panel; Future  - HEMOGLOBIN A1C; Future  - CBC WITH DIFFERENTIAL; Future  - VITAMIN D,25 HYDROXY; Future  - HEP C VIRUS ANTIBODY; Future    3. Impaired fasting blood sugar  Blood glucose levels are 132. A1C 5.7 in 4/3/19. This remains in the prediabetic range, so we do not need to start her on any medications at this time. She will continue to manage this through her own efforts. I advised her to make diet changes to improve her glucose levels. Advised her to avoid sweets, decrease her carbohydrate intake, exercise regularly and keep a healthy weight.  We will do updated blood work as soon as possible.  - Lipid Profile; Future  - Comp Metabolic Panel; Future  - HEMOGLOBIN A1C; Future  - CBC WITH DIFFERENTIAL; Future  - VITAMIN D,25 HYDROXY; Future  - HEP C VIRUS ANTIBODY; Future    4. Vitamin D  deficiency  She was treated with high-dose vitamin D treatment for 3 months last April 2019 which she finished.  She is currently on over-the-counter vitamin D supplementation 1000 international units daily.  We will do updated blood work to check vitamin D level and we will treat depending on results.  - Lipid Profile; Future  - Comp Metabolic Panel; Future  - HEMOGLOBIN A1C; Future  - CBC WITH DIFFERENTIAL; Future  - VITAMIN D,25 HYDROXY; Future  - HEP C VIRUS ANTIBODY; Future    5. Osteopenia of multiple sites         Last DEXA scan in 2018 demonstrates osteopenia of hip and lumbar spine. She is advised         to continue to take calcium and vitamin D supplementation. She will have repeat DEXA               scan next year.  - Lipid Profile; Future  - Comp Metabolic Panel; Future  - HEMOGLOBIN A1C; Future  - CBC WITH DIFFERENTIAL; Future  - VITAMIN D,25 HYDROXY; Future  - HEP C VIRUS ANTIBODY; Future    6. Dupuytren's disease of palm of left hand          On exam she has hardening and thickening of the tendon of the left middle finger                         consistent with Dupuytren's disease.  There is no evidence of contracture at this time.                Discussed referral to hand surgeon.  Patient declined referral as she is asymptomatic.                Advised to do range of movement exercises to avoid contractures/flexion deformity.  She          will let me know if she starts having problems with the range of movement..      7. Need for hepatitis C screening test  Patient will complete Hepatitis C screening.  - Lipid Profile; Future  - Comp Metabolic Panel; Future  - HEMOGLOBIN A1C; Future  - CBC WITH DIFFERENTIAL; Future  - VITAMIN D,25 HYDROXY; Future  - HEP C VIRUS ANTIBODY; Future          Juliane MCKEON (Brittanie), am scribing for, and in the presence of, Yas Phelps MD     Electronically signed by: Juliane Heath), 3/16/2020    Yas MCKEON MD personally performed the services  described in this documentation, as scribed by Juliane Oliveira in my presence, and it is both accurate and complete.

## 2020-03-18 ENCOUNTER — HOSPITAL ENCOUNTER (OUTPATIENT)
Dept: LAB | Facility: MEDICAL CENTER | Age: 75
End: 2020-03-18
Attending: FAMILY MEDICINE
Payer: MEDICARE

## 2020-03-18 DIAGNOSIS — R03.0 WHITE COAT SYNDROME WITHOUT HYPERTENSION: ICD-10-CM

## 2020-03-18 DIAGNOSIS — R73.01 IMPAIRED FASTING BLOOD SUGAR: ICD-10-CM

## 2020-03-18 DIAGNOSIS — E55.9 VITAMIN D DEFICIENCY: ICD-10-CM

## 2020-03-18 DIAGNOSIS — M85.89 OSTEOPENIA OF MULTIPLE SITES: ICD-10-CM

## 2020-03-18 DIAGNOSIS — E78.5 DYSLIPIDEMIA: ICD-10-CM

## 2020-03-18 DIAGNOSIS — Z11.59 NEED FOR HEPATITIS C SCREENING TEST: ICD-10-CM

## 2020-03-18 LAB
25(OH)D3 SERPL-MCNC: 41 NG/ML (ref 30–100)
ALBUMIN SERPL BCP-MCNC: 4.4 G/DL (ref 3.2–4.9)
ALBUMIN/GLOB SERPL: 1.6 G/DL
ALP SERPL-CCNC: 53 U/L (ref 30–99)
ALT SERPL-CCNC: 17 U/L (ref 2–50)
ANION GAP SERPL CALC-SCNC: 9 MMOL/L (ref 7–16)
AST SERPL-CCNC: 18 U/L (ref 12–45)
BASOPHILS # BLD AUTO: 1 % (ref 0–1.8)
BASOPHILS # BLD: 0.07 K/UL (ref 0–0.12)
BILIRUB SERPL-MCNC: 0.4 MG/DL (ref 0.1–1.5)
BUN SERPL-MCNC: 18 MG/DL (ref 8–22)
CALCIUM SERPL-MCNC: 9.2 MG/DL (ref 8.5–10.5)
CHLORIDE SERPL-SCNC: 101 MMOL/L (ref 96–112)
CHOLEST SERPL-MCNC: 256 MG/DL (ref 100–199)
CO2 SERPL-SCNC: 26 MMOL/L (ref 20–33)
CREAT SERPL-MCNC: 0.8 MG/DL (ref 0.5–1.4)
EOSINOPHIL # BLD AUTO: 0.18 K/UL (ref 0–0.51)
EOSINOPHIL NFR BLD: 2.5 % (ref 0–6.9)
ERYTHROCYTE [DISTWIDTH] IN BLOOD BY AUTOMATED COUNT: 43.8 FL (ref 35.9–50)
EST. AVERAGE GLUCOSE BLD GHB EST-MCNC: 108 MG/DL
FASTING STATUS PATIENT QL REPORTED: NORMAL
GLOBULIN SER CALC-MCNC: 2.8 G/DL (ref 1.9–3.5)
GLUCOSE SERPL-MCNC: 87 MG/DL (ref 65–99)
HBA1C MFR BLD: 5.4 % (ref 0–5.6)
HCT VFR BLD AUTO: 43.6 % (ref 37–47)
HCV AB SER QL: NORMAL
HDLC SERPL-MCNC: 53 MG/DL
HGB BLD-MCNC: 14.8 G/DL (ref 12–16)
IMM GRANULOCYTES # BLD AUTO: 0.02 K/UL (ref 0–0.11)
IMM GRANULOCYTES NFR BLD AUTO: 0.3 % (ref 0–0.9)
LDLC SERPL CALC-MCNC: 130 MG/DL
LYMPHOCYTES # BLD AUTO: 2.26 K/UL (ref 1–4.8)
LYMPHOCYTES NFR BLD: 31.8 % (ref 22–41)
MCH RBC QN AUTO: 33.2 PG (ref 27–33)
MCHC RBC AUTO-ENTMCNC: 33.9 G/DL (ref 33.6–35)
MCV RBC AUTO: 97.8 FL (ref 81.4–97.8)
MONOCYTES # BLD AUTO: 0.54 K/UL (ref 0–0.85)
MONOCYTES NFR BLD AUTO: 7.6 % (ref 0–13.4)
NEUTROPHILS # BLD AUTO: 4.04 K/UL (ref 2–7.15)
NEUTROPHILS NFR BLD: 56.8 % (ref 44–72)
NRBC # BLD AUTO: 0 K/UL
NRBC BLD-RTO: 0 /100 WBC
PLATELET # BLD AUTO: 260 K/UL (ref 164–446)
PMV BLD AUTO: 11.2 FL (ref 9–12.9)
POTASSIUM SERPL-SCNC: 4.5 MMOL/L (ref 3.6–5.5)
PROT SERPL-MCNC: 7.2 G/DL (ref 6–8.2)
RBC # BLD AUTO: 4.46 M/UL (ref 4.2–5.4)
SODIUM SERPL-SCNC: 136 MMOL/L (ref 135–145)
TRIGL SERPL-MCNC: 364 MG/DL (ref 0–149)
WBC # BLD AUTO: 7.1 K/UL (ref 4.8–10.8)

## 2020-03-18 PROCEDURE — 80061 LIPID PANEL: CPT

## 2020-03-18 PROCEDURE — 86803 HEPATITIS C AB TEST: CPT

## 2020-03-18 PROCEDURE — 83036 HEMOGLOBIN GLYCOSYLATED A1C: CPT

## 2020-03-18 PROCEDURE — 82306 VITAMIN D 25 HYDROXY: CPT

## 2020-03-18 PROCEDURE — 36415 COLL VENOUS BLD VENIPUNCTURE: CPT

## 2020-03-18 PROCEDURE — 80053 COMPREHEN METABOLIC PANEL: CPT

## 2020-03-18 PROCEDURE — 85025 COMPLETE CBC W/AUTO DIFF WBC: CPT

## 2020-04-08 ENCOUNTER — PATIENT OUTREACH (OUTPATIENT)
Dept: HEALTH INFORMATION MANAGEMENT | Facility: OTHER | Age: 75
End: 2020-04-08

## 2020-04-08 NOTE — PROGRESS NOTES
1. [facility] Verified: yes    2. Verify PCP: yes    3. Review and add  to Care Team: yes    4. Reviewed/Updated the following with patient:       •   Communication Preference Obtained? YES  • MyChart Activation: already active       •   E-Mail Address Obtained? YES       •   Appointment Day and Time Preferences? YES       •   Preferred Pharmacy? YES       •   Preferred Lab? YES    5. Care Gap Scheduling (Attempt to Schedule EACH Overdue Care Gap!)    Patient prefers to discuss Annual Wellness Visit (AWV), Immunizations: PNEUMOVAX (PPSV23) and Mammogram with PCP.

## 2021-01-15 DIAGNOSIS — Z23 NEED FOR VACCINATION: ICD-10-CM

## 2021-02-03 ENCOUNTER — OFFICE VISIT (OUTPATIENT)
Dept: MEDICAL GROUP | Facility: PHYSICIAN GROUP | Age: 76
End: 2021-02-03
Payer: MEDICARE

## 2021-02-03 ENCOUNTER — HOSPITAL ENCOUNTER (OUTPATIENT)
Facility: MEDICAL CENTER | Age: 76
End: 2021-02-03
Attending: FAMILY MEDICINE
Payer: MEDICARE

## 2021-02-03 VITALS
HEIGHT: 60 IN | BODY MASS INDEX: 24.32 KG/M2 | HEART RATE: 90 BPM | WEIGHT: 123.9 LBS | OXYGEN SATURATION: 95 % | DIASTOLIC BLOOD PRESSURE: 110 MMHG | TEMPERATURE: 96.9 F | SYSTOLIC BLOOD PRESSURE: 150 MMHG

## 2021-02-03 DIAGNOSIS — N39.0 URINARY TRACT INFECTION WITHOUT HEMATURIA, SITE UNSPECIFIED: ICD-10-CM

## 2021-02-03 LAB
APPEARANCE UR: NORMAL
BILIRUB UR STRIP-MCNC: NORMAL MG/DL
COLOR UR AUTO: NORMAL
GLUCOSE UR STRIP.AUTO-MCNC: 100 MG/DL
KETONES UR STRIP.AUTO-MCNC: NORMAL MG/DL
LEUKOCYTE ESTERASE UR QL STRIP.AUTO: NORMAL
NITRITE UR QL STRIP.AUTO: POSITIVE
PH UR STRIP.AUTO: 6 [PH] (ref 5–8)
PROT UR QL STRIP: NORMAL MG/DL
RBC UR QL AUTO: NORMAL
SP GR UR STRIP.AUTO: 1.02
UROBILINOGEN UR STRIP-MCNC: 1 MG/DL

## 2021-02-03 PROCEDURE — 87086 URINE CULTURE/COLONY COUNT: CPT

## 2021-02-03 PROCEDURE — 99000 SPECIMEN HANDLING OFFICE-LAB: CPT | Performed by: FAMILY MEDICINE

## 2021-02-03 PROCEDURE — 81002 URINALYSIS NONAUTO W/O SCOPE: CPT | Performed by: FAMILY MEDICINE

## 2021-02-03 PROCEDURE — 99214 OFFICE O/P EST MOD 30 MIN: CPT | Performed by: FAMILY MEDICINE

## 2021-02-03 RX ORDER — NITROFURANTOIN 25; 75 MG/1; MG/1
100 CAPSULE ORAL 2 TIMES DAILY
Qty: 10 CAP | Refills: 0 | Status: SHIPPED | OUTPATIENT
Start: 2021-02-03 | End: 2021-07-30

## 2021-02-03 ASSESSMENT — FIBROSIS 4 INDEX: FIB4 SCORE: 1.23

## 2021-02-03 ASSESSMENT — PATIENT HEALTH QUESTIONNAIRE - PHQ9: CLINICAL INTERPRETATION OF PHQ2 SCORE: 0

## 2021-02-04 NOTE — PROGRESS NOTES
Subjective:      Karla Prince is a 75 y.o. female who presents with UTI            HPI     Patient is here because of suprapubic pressure and dysuria of 7 to 10 days duration.  Denies any blood in the urine.  Denies any flank pain.  Denies any fever or chills.  She said she has been drinking cranberry juice and has started taking Azo tablets over-the-counter couple of days ago.  Denies any vomiting but she said she has some nausea.    She had UTI in September 2019 confirmed by culture and treated appropriately with antibiotics.    Past medical history, past surgical history, family history reviewed-no changes    Social history reviewed-no changes    Allergies reviewed-no changes    Medications reviewed-no changes    ROS     Per HPI, the rest are negative.       Objective:     /110 (BP Location: Left arm, Patient Position: Sitting, BP Cuff Size: Adult)   Pulse 90   Temp 36.1 °C (96.9 °F) (Temporal)   Ht 1.524 m (5')   Wt 56.2 kg (123 lb 14.4 oz)   SpO2 95%   BMI 24.20 kg/m²      Physical Exam     Examined alert, awake, oriented, not in distress    Neck-supple, no lymphadenopathy, no thyromegaly  Lungs-clear to auscultation, no rales, no wheezes  Heart-regular rate and rhythm, no murmur  Abdomen-good bowel sounds, soft, nontender, no hepatosplenomegaly, no masses, no CVA tenderness  Extremities-no edema, clubbing, cyanosis       Urine dipstick    Results for KARLA PRINCE (MRN [id number]) as of 2/4/2021 14:03   Ref. Range 2/3/2021 17:24   POC Color Latest Ref Range: Negative  Lt Orange   POC Appearance Latest Ref Range: Negative  Clr   POC Specific Gravity Latest Ref Range: <1.005 - >1.030  1.020   POC Urine PH Latest Ref Range: 5.0 - 8.0  6.0   POC Glucose Latest Ref Range: Negative mg/dL 100   POC Ketones Latest Ref Range: Negative mg/dL neg   POC Protein Latest Ref Range: Negative mg/dL trace   POC Nitrites Latest Ref Range: Negative  positive   POC Leukocyte Esterase Latest Ref Range: Negative  trace    POC Blood Latest Ref Range: Negative  small   POC Bilirubin Latest Ref Range: Negative mg/dL neg   POC Urobiligen Latest Ref Range: Negative (0.2) mg/dL 1.0        Assessment/Plan:        1. Urinary tract infection without hematuria, site unspecified  Symptoms are consistent with UTI and urine dipstick also consistent with UTI.  I will go ahead and treat with nitrofurantoin while waiting for the culture results.  Advise increase p.o. fluids.  - POCT Urinalysis  - URINE CULTURE(NEW); Future  - nitrofurantoin (MACROBID) 100 MG Cap; Take 1 Cap by mouth 2 times a day.  Dispense: 10 Cap; Refill: 0    Keep appointment as scheduled.      Please note that this dictation was created using voice recognition software. I have worked with consultants from the vendor as well as technical experts from [facility] to optimize the interface. I have made every reasonable attempt to correct obvious errors, but I expect that there are errors of grammar and possibly content I did not discover before finalizing the note.

## 2021-02-06 LAB
BACTERIA UR CULT: NORMAL
SIGNIFICANT IND 70042: NORMAL
SITE SITE: NORMAL
SOURCE SOURCE: NORMAL

## 2021-02-16 ENCOUNTER — IMMUNIZATION (OUTPATIENT)
Dept: FAMILY PLANNING/WOMEN'S HEALTH CLINIC | Facility: IMMUNIZATION CENTER | Age: 76
End: 2021-02-16
Attending: INTERNAL MEDICINE
Payer: MEDICARE

## 2021-02-16 DIAGNOSIS — Z23 ENCOUNTER FOR VACCINATION: Primary | ICD-10-CM

## 2021-02-16 DIAGNOSIS — Z23 NEED FOR VACCINATION: ICD-10-CM

## 2021-02-16 PROCEDURE — 91300 PFIZER SARS-COV-2 VACCINE: CPT

## 2021-02-16 PROCEDURE — 0001A PFIZER SARS-COV-2 VACCINE: CPT

## 2021-03-10 ENCOUNTER — IMMUNIZATION (OUTPATIENT)
Dept: FAMILY PLANNING/WOMEN'S HEALTH CLINIC | Facility: IMMUNIZATION CENTER | Age: 76
End: 2021-03-10
Attending: INTERNAL MEDICINE
Payer: MEDICARE

## 2021-03-10 DIAGNOSIS — Z23 ENCOUNTER FOR VACCINATION: Primary | ICD-10-CM

## 2021-03-10 PROCEDURE — 91300 PFIZER SARS-COV-2 VACCINE: CPT | Performed by: INTERNAL MEDICINE

## 2021-03-10 PROCEDURE — 0002A PFIZER SARS-COV-2 VACCINE: CPT | Performed by: INTERNAL MEDICINE

## 2021-03-24 ENCOUNTER — HOSPITAL ENCOUNTER (OUTPATIENT)
Dept: LAB | Facility: MEDICAL CENTER | Age: 76
End: 2021-03-24
Attending: FAMILY MEDICINE
Payer: MEDICARE

## 2021-03-24 ENCOUNTER — OFFICE VISIT (OUTPATIENT)
Dept: MEDICAL GROUP | Facility: PHYSICIAN GROUP | Age: 76
End: 2021-03-24
Payer: MEDICARE

## 2021-03-24 VITALS
WEIGHT: 119.49 LBS | HEIGHT: 60 IN | TEMPERATURE: 97.1 F | OXYGEN SATURATION: 98 % | HEART RATE: 66 BPM | DIASTOLIC BLOOD PRESSURE: 80 MMHG | SYSTOLIC BLOOD PRESSURE: 160 MMHG | BODY MASS INDEX: 23.46 KG/M2

## 2021-03-24 DIAGNOSIS — Z23 NEED FOR 23-POLYVALENT PNEUMOCOCCAL POLYSACCHARIDE VACCINE: ICD-10-CM

## 2021-03-24 DIAGNOSIS — E78.5 DYSLIPIDEMIA: ICD-10-CM

## 2021-03-24 DIAGNOSIS — M85.89 OSTEOPENIA OF MULTIPLE SITES: ICD-10-CM

## 2021-03-24 DIAGNOSIS — Z12.31 ENCOUNTER FOR SCREENING MAMMOGRAM FOR MALIGNANT NEOPLASM OF BREAST: ICD-10-CM

## 2021-03-24 DIAGNOSIS — R73.01 IMPAIRED FASTING BLOOD SUGAR: ICD-10-CM

## 2021-03-24 DIAGNOSIS — E55.9 VITAMIN D DEFICIENCY: ICD-10-CM

## 2021-03-24 DIAGNOSIS — R03.0 WHITE COAT SYNDROME WITHOUT HYPERTENSION: ICD-10-CM

## 2021-03-24 PROCEDURE — 82306 VITAMIN D 25 HYDROXY: CPT

## 2021-03-24 PROCEDURE — 90732 PPSV23 VACC 2 YRS+ SUBQ/IM: CPT | Performed by: FAMILY MEDICINE

## 2021-03-24 PROCEDURE — 99214 OFFICE O/P EST MOD 30 MIN: CPT | Mod: 25 | Performed by: FAMILY MEDICINE

## 2021-03-24 PROCEDURE — 85025 COMPLETE CBC W/AUTO DIFF WBC: CPT

## 2021-03-24 PROCEDURE — 36415 COLL VENOUS BLD VENIPUNCTURE: CPT

## 2021-03-24 PROCEDURE — 80061 LIPID PANEL: CPT

## 2021-03-24 PROCEDURE — G0009 ADMIN PNEUMOCOCCAL VACCINE: HCPCS | Performed by: FAMILY MEDICINE

## 2021-03-24 PROCEDURE — 83036 HEMOGLOBIN GLYCOSYLATED A1C: CPT

## 2021-03-24 PROCEDURE — 8041 PR SCP AHA: Performed by: FAMILY MEDICINE

## 2021-03-24 PROCEDURE — 80053 COMPREHEN METABOLIC PANEL: CPT

## 2021-03-24 ASSESSMENT — FIBROSIS 4 INDEX: FIB4 SCORE: 1.23

## 2021-03-24 NOTE — PROGRESS NOTES
Subjective:      Karla Prince is a 75 y.o. female who presents with Hypertension (white coat)            HPI     Patient is here for regular follow-up of her medical problems as well as for AHA.    In terms of whitecoat syndrome without hypertension, her blood pressure always runs high in the office but at home they are consistently running in the 140s over 70s.  She said yesterday was 146/79.    For her dyslipidemia, this is managed with her own efforts.  Her main problem is elevated triglycerides.  The last lipid panel was in March 2020 and the triglycerides went down from 402-364.  At that time the LDL was slightly high at 130.  She is fasting this morning so she can do the blood work.    We also follow her for impaired fasting blood sugar but in March 2020 her fasting blood glucose was normal at 87 and the hemoglobin A1c was 5.4.    She takes vitamin D supplementation for vitamin D deficiency.  She said she takes calcium with vitamin D as well as a separate vitamin D tablet which has 1000 international units/tab.  She does not know how much vitamin D the combination calcium with D has.    She has osteopenia of multiple sites involving the lumbar spine and the hip.  The last bone density scan was in 2018.    Patient is due for screening mammogram with the last one in 2019.    She got her 2 COVID-19 shots with the last 1, 2 weeks ago.  She is due for Pneumovax 23 and Shingrix.  She wants to hold off on Shingrix until next year.      Past medical history, past surgical history, family history reviewed-no changes    Social history reviewed-no changes    Allergies reviewed-no changes    Medications reviewed-no changes      ROS     As per HPI, the rest are negative.    Annual Health Assessment Questions    1.  Are you currently engaging in any exercise or physical activity? Yes    2.  How would you describe your mood or emotional well-being today? good    3.  Have you had any falls in the last year? No    4.  Have you  noticed any problems with your balance or had difficulty walking? No    5.  In the last six months have you experienced any leakage of urine? No    6. DPA/Advanced Directive: Patient has Advanced Directive on file.      Objective:     /80   Pulse 66   Temp 36.2 °C (97.1 °F) (Temporal)   Ht 1.524 m (5')   Wt 54.2 kg (119 lb 7.8 oz)   SpO2 98%   BMI 23.34 kg/m²      Physical Exam     Examined alert, awake, oriented, not in distress    Neck-supple, no lymphadenopathy, no thyromegaly  Lungs-clear to auscultation, no rales, no wheezes  Heart-regular rate and rhythm, no murmur  Extremities-no edema, clubbing, cyanosis            Assessment/Plan:        1. White coat syndrome without hypertension  Blood pressure continues to run high in the office but home blood pressure readings are acceptable.  She will continue to monitor her blood pressure regularly and if it starts running high above 140 systolic and 90 or higher diastolic she will let me know.  We will do screening labs.  - Lipid Profile; Future  - Comp Metabolic Panel; Future  - HEMOGLOBIN A1C; Future  - CBC WITH DIFFERENTIAL; Future  - VITAMIN D,25 HYDROXY; Future    2. Dyslipidemia  She is managing this with her own efforts.  Advised to continue to watch her diet in terms of carbs, sweets, advised to continue to stay active, avoid fatty foods.  We will do updated blood work.  - Lipid Profile; Future  - Comp Metabolic Panel; Future  - HEMOGLOBIN A1C; Future  - CBC WITH DIFFERENTIAL; Future  - VITAMIN D,25 HYDROXY; Future    3. Impaired fasting blood sugar  Advised to continue to avoid sweets and decrease the carbs.  We will do updated blood work.  - Lipid Profile; Future  - Comp Metabolic Panel; Future  - HEMOGLOBIN A1C; Future  - CBC WITH DIFFERENTIAL; Future  - VITAMIN D,25 HYDROXY; Future    4. Vitamin D deficiency  Continue vitamin D supplementation.  We will do updated vitamin D level.  - Lipid Profile; Future  - Comp Metabolic Panel; Future  -  HEMOGLOBIN A1C; Future  - CBC WITH DIFFERENTIAL; Future  - VITAMIN D,25 HYDROXY; Future    5. Osteopenia of multiple sites  She needs updated bone density scan for follow-up on her scores.  She will schedule it.  Continue calcium and vitamin D supplementation.  - DS-BONE DENSITY STUDY (DEXA); Future    6. Encounter for screening mammogram for malignant neoplasm of breast  She will schedule her screening mammogram.  - MA-SCREENING MAMMO BILAT W/CAD; Future    7. Need for 23-polyvalent pneumococcal polysaccharide vaccine  Pneumovax 23 was given.  - PneumoVax PPV23 =>1yo    We will give the Shingrix vaccine next year when she returns for her follow-up.      Please note that this dictation was created using voice recognition software. I have worked with consultants from the vendor as well as technical experts from [facility]  [employer] to optimize the interface. I have made every reasonable attempt to correct obvious errors, but I expect that there are errors of grammar and possibly content I did not discover before finalizing the note.

## 2021-03-25 LAB
25(OH)D3 SERPL-MCNC: 56 NG/ML (ref 30–100)
ALBUMIN SERPL BCP-MCNC: 4.3 G/DL (ref 3.2–4.9)
ALBUMIN/GLOB SERPL: 1.4 G/DL
ALP SERPL-CCNC: 79 U/L (ref 30–99)
ALT SERPL-CCNC: 21 U/L (ref 2–50)
ANION GAP SERPL CALC-SCNC: 12 MMOL/L (ref 7–16)
AST SERPL-CCNC: 19 U/L (ref 12–45)
BASOPHILS # BLD AUTO: 0.6 % (ref 0–1.8)
BASOPHILS # BLD: 0.06 K/UL (ref 0–0.12)
BILIRUB SERPL-MCNC: 0.4 MG/DL (ref 0.1–1.5)
BUN SERPL-MCNC: 14 MG/DL (ref 8–22)
CALCIUM SERPL-MCNC: 9.7 MG/DL (ref 8.5–10.5)
CHLORIDE SERPL-SCNC: 104 MMOL/L (ref 96–112)
CHOLEST SERPL-MCNC: 214 MG/DL (ref 100–199)
CO2 SERPL-SCNC: 26 MMOL/L (ref 20–33)
CREAT SERPL-MCNC: 0.75 MG/DL (ref 0.5–1.4)
EOSINOPHIL # BLD AUTO: 0.04 K/UL (ref 0–0.51)
EOSINOPHIL NFR BLD: 0.4 % (ref 0–6.9)
ERYTHROCYTE [DISTWIDTH] IN BLOOD BY AUTOMATED COUNT: 45.7 FL (ref 35.9–50)
EST. AVERAGE GLUCOSE BLD GHB EST-MCNC: 103 MG/DL
FASTING STATUS PATIENT QL REPORTED: NORMAL
GLOBULIN SER CALC-MCNC: 3 G/DL (ref 1.9–3.5)
GLUCOSE SERPL-MCNC: 84 MG/DL (ref 65–99)
HBA1C MFR BLD: 5.2 % (ref 4–5.6)
HCT VFR BLD AUTO: 46.3 % (ref 37–47)
HDLC SERPL-MCNC: 56 MG/DL
HGB BLD-MCNC: 14.9 G/DL (ref 12–16)
IMM GRANULOCYTES # BLD AUTO: 0.02 K/UL (ref 0–0.11)
IMM GRANULOCYTES NFR BLD AUTO: 0.2 % (ref 0–0.9)
LDLC SERPL CALC-MCNC: 119 MG/DL
LYMPHOCYTES # BLD AUTO: 2.08 K/UL (ref 1–4.8)
LYMPHOCYTES NFR BLD: 20.8 % (ref 22–41)
MCH RBC QN AUTO: 32.9 PG (ref 27–33)
MCHC RBC AUTO-ENTMCNC: 32.2 G/DL (ref 33.6–35)
MCV RBC AUTO: 102.2 FL (ref 81.4–97.8)
MONOCYTES # BLD AUTO: 0.48 K/UL (ref 0–0.85)
MONOCYTES NFR BLD AUTO: 4.8 % (ref 0–13.4)
NEUTROPHILS # BLD AUTO: 7.32 K/UL (ref 2–7.15)
NEUTROPHILS NFR BLD: 73.2 % (ref 44–72)
NRBC # BLD AUTO: 0 K/UL
NRBC BLD-RTO: 0 /100 WBC
PLATELET # BLD AUTO: 247 K/UL (ref 164–446)
PMV BLD AUTO: 12.3 FL (ref 9–12.9)
POTASSIUM SERPL-SCNC: 4.2 MMOL/L (ref 3.6–5.5)
PROT SERPL-MCNC: 7.3 G/DL (ref 6–8.2)
RBC # BLD AUTO: 4.53 M/UL (ref 4.2–5.4)
SODIUM SERPL-SCNC: 142 MMOL/L (ref 135–145)
TRIGL SERPL-MCNC: 195 MG/DL (ref 0–149)
WBC # BLD AUTO: 10 K/UL (ref 4.8–10.8)

## 2021-05-28 ENCOUNTER — HOSPITAL ENCOUNTER (OUTPATIENT)
Dept: RADIOLOGY | Facility: MEDICAL CENTER | Age: 76
End: 2021-05-28
Attending: FAMILY MEDICINE
Payer: MEDICARE

## 2021-05-28 DIAGNOSIS — Z12.31 ENCOUNTER FOR SCREENING MAMMOGRAM FOR MALIGNANT NEOPLASM OF BREAST: ICD-10-CM

## 2021-05-28 DIAGNOSIS — M85.89 OSTEOPENIA OF MULTIPLE SITES: ICD-10-CM

## 2021-05-28 PROCEDURE — 77080 DXA BONE DENSITY AXIAL: CPT

## 2021-05-28 PROCEDURE — 77067 SCR MAMMO BI INCL CAD: CPT

## 2021-07-27 ENCOUNTER — PATIENT MESSAGE (OUTPATIENT)
Dept: HEALTH INFORMATION MANAGEMENT | Facility: OTHER | Age: 76
End: 2021-07-27

## 2021-07-30 ENCOUNTER — APPOINTMENT (OUTPATIENT)
Dept: RADIOLOGY | Facility: MEDICAL CENTER | Age: 76
DRG: 690 | End: 2021-07-30
Attending: EMERGENCY MEDICINE
Payer: MEDICARE

## 2021-07-30 ENCOUNTER — APPOINTMENT (OUTPATIENT)
Dept: CARDIOLOGY | Facility: MEDICAL CENTER | Age: 76
DRG: 690 | End: 2021-07-30
Attending: INTERNAL MEDICINE
Payer: MEDICARE

## 2021-07-30 ENCOUNTER — APPOINTMENT (OUTPATIENT)
Dept: MEDICAL GROUP | Facility: PHYSICIAN GROUP | Age: 76
End: 2021-07-30
Payer: MEDICARE

## 2021-07-30 ENCOUNTER — APPOINTMENT (OUTPATIENT)
Dept: RADIOLOGY | Facility: MEDICAL CENTER | Age: 76
DRG: 690 | End: 2021-07-30
Attending: INTERNAL MEDICINE
Payer: MEDICARE

## 2021-07-30 ENCOUNTER — HOSPITAL ENCOUNTER (INPATIENT)
Facility: MEDICAL CENTER | Age: 76
LOS: 7 days | DRG: 690 | End: 2021-08-06
Attending: EMERGENCY MEDICINE | Admitting: INTERNAL MEDICINE
Payer: MEDICARE

## 2021-07-30 DIAGNOSIS — N30.00 ACUTE CYSTITIS WITHOUT HEMATURIA: ICD-10-CM

## 2021-07-30 DIAGNOSIS — R33.9 URINARY RETENTION: ICD-10-CM

## 2021-07-30 DIAGNOSIS — R11.2 NON-INTRACTABLE VOMITING WITH NAUSEA, UNSPECIFIED VOMITING TYPE: ICD-10-CM

## 2021-07-30 DIAGNOSIS — R42 VERTIGO: ICD-10-CM

## 2021-07-30 DIAGNOSIS — E87.1 HYPONATREMIA: ICD-10-CM

## 2021-07-30 DIAGNOSIS — R63.0 POOR APPETITE: ICD-10-CM

## 2021-07-30 LAB
ALBUMIN SERPL BCP-MCNC: 4.2 G/DL (ref 3.2–4.9)
ALBUMIN/GLOB SERPL: 1.5 G/DL
ALP SERPL-CCNC: 56 U/L (ref 30–99)
ALT SERPL-CCNC: 14 U/L (ref 2–50)
ANION GAP SERPL CALC-SCNC: 11 MMOL/L (ref 7–16)
APPEARANCE UR: ABNORMAL
AST SERPL-CCNC: 15 U/L (ref 12–45)
BACTERIA #/AREA URNS HPF: ABNORMAL /HPF
BASOPHILS # BLD AUTO: 0.3 % (ref 0–1.8)
BASOPHILS # BLD: 0.03 K/UL (ref 0–0.12)
BILIRUB SERPL-MCNC: 0.6 MG/DL (ref 0.1–1.5)
BILIRUB UR QL STRIP.AUTO: ABNORMAL
BUN SERPL-MCNC: 13 MG/DL (ref 8–22)
CALCIUM SERPL-MCNC: 9.1 MG/DL (ref 8.4–10.2)
CHLORIDE SERPL-SCNC: 93 MMOL/L (ref 96–112)
CO2 SERPL-SCNC: 24 MMOL/L (ref 20–33)
COLOR UR: YELLOW
CREAT SERPL-MCNC: 0.61 MG/DL (ref 0.5–1.4)
EKG IMPRESSION: NORMAL
EOSINOPHIL # BLD AUTO: 0 K/UL (ref 0–0.51)
EOSINOPHIL NFR BLD: 0 % (ref 0–6.9)
EPI CELLS #/AREA URNS HPF: ABNORMAL /HPF
ERYTHROCYTE [DISTWIDTH] IN BLOOD BY AUTOMATED COUNT: 39.7 FL (ref 35.9–50)
EST. AVERAGE GLUCOSE BLD GHB EST-MCNC: 94 MG/DL
FLUAV RNA SPEC QL NAA+PROBE: NEGATIVE
FLUBV RNA SPEC QL NAA+PROBE: NEGATIVE
GLOBULIN SER CALC-MCNC: 2.8 G/DL (ref 1.9–3.5)
GLUCOSE SERPL-MCNC: 122 MG/DL (ref 65–99)
GLUCOSE UR STRIP.AUTO-MCNC: NEGATIVE MG/DL
HBA1C MFR BLD: 4.9 % (ref 4–5.6)
HCT VFR BLD AUTO: 41.2 % (ref 37–47)
HGB BLD-MCNC: 14.5 G/DL (ref 12–16)
IMM GRANULOCYTES # BLD AUTO: 0.05 K/UL (ref 0–0.11)
IMM GRANULOCYTES NFR BLD AUTO: 0.5 % (ref 0–0.9)
KETONES UR STRIP.AUTO-MCNC: 40 MG/DL
LEUKOCYTE ESTERASE UR QL STRIP.AUTO: ABNORMAL
LIPASE SERPL-CCNC: 28 U/L (ref 7–58)
LYMPHOCYTES # BLD AUTO: 0.67 K/UL (ref 1–4.8)
LYMPHOCYTES NFR BLD: 6.3 % (ref 22–41)
MAGNESIUM SERPL-MCNC: 1.9 MG/DL (ref 1.5–2.5)
MCH RBC QN AUTO: 33.3 PG (ref 27–33)
MCHC RBC AUTO-ENTMCNC: 35.2 G/DL (ref 33.6–35)
MCV RBC AUTO: 94.5 FL (ref 81.4–97.8)
MICRO URNS: ABNORMAL
MONOCYTES # BLD AUTO: 0.48 K/UL (ref 0–0.85)
MONOCYTES NFR BLD AUTO: 4.5 % (ref 0–13.4)
MUCOUS THREADS #/AREA URNS HPF: ABNORMAL /HPF
NEUTROPHILS # BLD AUTO: 9.47 K/UL (ref 2–7.15)
NEUTROPHILS NFR BLD: 88.4 % (ref 44–72)
NITRITE UR QL STRIP.AUTO: NEGATIVE
NRBC # BLD AUTO: 0 K/UL
NRBC BLD-RTO: 0 /100 WBC
PH UR STRIP.AUTO: 6.5 [PH] (ref 5–8)
PLATELET # BLD AUTO: 213 K/UL (ref 164–446)
PMV BLD AUTO: 10.3 FL (ref 9–12.9)
POTASSIUM SERPL-SCNC: 3.9 MMOL/L (ref 3.6–5.5)
PROT SERPL-MCNC: 7 G/DL (ref 6–8.2)
PROT UR QL STRIP: 30 MG/DL
RBC # BLD AUTO: 4.36 M/UL (ref 4.2–5.4)
RBC # URNS HPF: ABNORMAL /HPF
RBC UR QL AUTO: ABNORMAL
RSV RNA SPEC QL NAA+PROBE: NEGATIVE
SARS-COV-2 RNA RESP QL NAA+PROBE: NOTDETECTED
SODIUM SERPL-SCNC: 128 MMOL/L (ref 135–145)
SP GR UR STRIP.AUTO: 1.02
SPECIMEN SOURCE: NORMAL
TROPONIN T SERPL-MCNC: 9 NG/L (ref 6–19)
UNIDENT CRYS URNS QL MICRO: ABNORMAL /HPF
WBC # BLD AUTO: 10.7 K/UL (ref 4.8–10.8)
WBC #/AREA URNS HPF: ABNORMAL /HPF

## 2021-07-30 PROCEDURE — 83735 ASSAY OF MAGNESIUM: CPT

## 2021-07-30 PROCEDURE — 74018 RADEX ABDOMEN 1 VIEW: CPT

## 2021-07-30 PROCEDURE — 70450 CT HEAD/BRAIN W/O DYE: CPT | Mod: ME

## 2021-07-30 PROCEDURE — 700111 HCHG RX REV CODE 636 W/ 250 OVERRIDE (IP): Performed by: INTERNAL MEDICINE

## 2021-07-30 PROCEDURE — 71045 X-RAY EXAM CHEST 1 VIEW: CPT

## 2021-07-30 PROCEDURE — 83690 ASSAY OF LIPASE: CPT

## 2021-07-30 PROCEDURE — 36415 COLL VENOUS BLD VENIPUNCTURE: CPT

## 2021-07-30 PROCEDURE — 81001 URINALYSIS AUTO W/SCOPE: CPT

## 2021-07-30 PROCEDURE — 87086 URINE CULTURE/COLONY COUNT: CPT

## 2021-07-30 PROCEDURE — 0241U HCHG SARS-COV-2 COVID-19 NFCT DS RESP RNA 4 TRGT MIC: CPT

## 2021-07-30 PROCEDURE — 94760 N-INVAS EAR/PLS OXIMETRY 1: CPT

## 2021-07-30 PROCEDURE — 70551 MRI BRAIN STEM W/O DYE: CPT | Mod: MG

## 2021-07-30 PROCEDURE — 99285 EMERGENCY DEPT VISIT HI MDM: CPT

## 2021-07-30 PROCEDURE — C9803 HOPD COVID-19 SPEC COLLECT: HCPCS | Performed by: EMERGENCY MEDICINE

## 2021-07-30 PROCEDURE — 99223 1ST HOSP IP/OBS HIGH 75: CPT | Mod: AI | Performed by: INTERNAL MEDICINE

## 2021-07-30 PROCEDURE — 85025 COMPLETE CBC W/AUTO DIFF WBC: CPT

## 2021-07-30 PROCEDURE — 93005 ELECTROCARDIOGRAM TRACING: CPT | Performed by: EMERGENCY MEDICINE

## 2021-07-30 PROCEDURE — 87077 CULTURE AEROBIC IDENTIFY: CPT

## 2021-07-30 PROCEDURE — 700102 HCHG RX REV CODE 250 W/ 637 OVERRIDE(OP): Performed by: INTERNAL MEDICINE

## 2021-07-30 PROCEDURE — 84484 ASSAY OF TROPONIN QUANT: CPT

## 2021-07-30 PROCEDURE — 770020 HCHG ROOM/CARE - TELE (206)

## 2021-07-30 PROCEDURE — 700105 HCHG RX REV CODE 258: Performed by: INTERNAL MEDICINE

## 2021-07-30 PROCEDURE — 83036 HEMOGLOBIN GLYCOSYLATED A1C: CPT

## 2021-07-30 PROCEDURE — 80053 COMPREHEN METABOLIC PANEL: CPT

## 2021-07-30 PROCEDURE — A9270 NON-COVERED ITEM OR SERVICE: HCPCS | Performed by: INTERNAL MEDICINE

## 2021-07-30 RX ORDER — HYDROMORPHONE HYDROCHLORIDE 1 MG/ML
0.25 INJECTION, SOLUTION INTRAMUSCULAR; INTRAVENOUS; SUBCUTANEOUS
Status: DISCONTINUED | OUTPATIENT
Start: 2021-07-30 | End: 2021-08-06 | Stop reason: HOSPADM

## 2021-07-30 RX ORDER — ASPIRIN 81 MG/1
324 TABLET, CHEWABLE ORAL DAILY
Status: DISCONTINUED | OUTPATIENT
Start: 2021-07-30 | End: 2021-07-31

## 2021-07-30 RX ORDER — ACETAMINOPHEN 325 MG/1
650 TABLET ORAL EVERY 6 HOURS PRN
Status: DISCONTINUED | OUTPATIENT
Start: 2021-07-30 | End: 2021-08-06 | Stop reason: HOSPADM

## 2021-07-30 RX ORDER — ASPIRIN 600 MG/1
300 SUPPOSITORY RECTAL DAILY
Status: DISCONTINUED | OUTPATIENT
Start: 2021-07-30 | End: 2021-07-31

## 2021-07-30 RX ORDER — IBUPROFEN 200 MG
400 TABLET ORAL EVERY 6 HOURS PRN
Status: ON HOLD | COMMUNITY
End: 2021-08-06

## 2021-07-30 RX ORDER — BISACODYL 10 MG
10 SUPPOSITORY, RECTAL RECTAL
Status: DISCONTINUED | OUTPATIENT
Start: 2021-07-30 | End: 2021-08-01

## 2021-07-30 RX ORDER — LABETALOL HYDROCHLORIDE 5 MG/ML
10 INJECTION, SOLUTION INTRAVENOUS EVERY 4 HOURS PRN
Status: DISCONTINUED | OUTPATIENT
Start: 2021-07-30 | End: 2021-08-06 | Stop reason: HOSPADM

## 2021-07-30 RX ORDER — ACETAMINOPHEN 500 MG
500-1000 TABLET ORAL EVERY 6 HOURS PRN
COMMUNITY
End: 2023-02-09

## 2021-07-30 RX ORDER — ONDANSETRON 4 MG/1
4 TABLET, ORALLY DISINTEGRATING ORAL EVERY 4 HOURS PRN
Status: DISCONTINUED | OUTPATIENT
Start: 2021-07-30 | End: 2021-08-06 | Stop reason: HOSPADM

## 2021-07-30 RX ORDER — ASPIRIN 325 MG
325 TABLET ORAL DAILY
Status: DISCONTINUED | OUTPATIENT
Start: 2021-07-30 | End: 2021-07-31

## 2021-07-30 RX ORDER — MECLIZINE HYDROCHLORIDE 25 MG/1
25 TABLET ORAL 3 TIMES DAILY PRN
Status: DISCONTINUED | OUTPATIENT
Start: 2021-07-30 | End: 2021-07-31

## 2021-07-30 RX ORDER — POLYETHYLENE GLYCOL 3350 17 G/17G
1 POWDER, FOR SOLUTION ORAL
Status: DISCONTINUED | OUTPATIENT
Start: 2021-07-30 | End: 2021-08-01

## 2021-07-30 RX ORDER — ONDANSETRON 2 MG/ML
4 INJECTION INTRAMUSCULAR; INTRAVENOUS EVERY 4 HOURS PRN
Status: DISCONTINUED | OUTPATIENT
Start: 2021-07-30 | End: 2021-08-06 | Stop reason: HOSPADM

## 2021-07-30 RX ORDER — OXYCODONE HYDROCHLORIDE 5 MG/1
5 TABLET ORAL
Status: DISCONTINUED | OUTPATIENT
Start: 2021-07-30 | End: 2021-08-06 | Stop reason: HOSPADM

## 2021-07-30 RX ORDER — ENALAPRILAT 1.25 MG/ML
1.25 INJECTION INTRAVENOUS EVERY 6 HOURS PRN
Status: DISCONTINUED | OUTPATIENT
Start: 2021-07-30 | End: 2021-08-06 | Stop reason: HOSPADM

## 2021-07-30 RX ORDER — AMOXICILLIN 250 MG
2 CAPSULE ORAL 2 TIMES DAILY
Status: DISCONTINUED | OUTPATIENT
Start: 2021-07-30 | End: 2021-08-01

## 2021-07-30 RX ORDER — OXYCODONE HYDROCHLORIDE 5 MG/1
2.5 TABLET ORAL
Status: DISCONTINUED | OUTPATIENT
Start: 2021-07-30 | End: 2021-08-06 | Stop reason: HOSPADM

## 2021-07-30 RX ORDER — SODIUM CHLORIDE, SODIUM LACTATE, POTASSIUM CHLORIDE, CALCIUM CHLORIDE 600; 310; 30; 20 MG/100ML; MG/100ML; MG/100ML; MG/100ML
1000 INJECTION, SOLUTION INTRAVENOUS ONCE
Status: COMPLETED | OUTPATIENT
Start: 2021-07-30 | End: 2021-07-30

## 2021-07-30 RX ADMIN — ASPIRIN 325 MG ORAL TABLET 325 MG: 325 PILL ORAL at 12:58

## 2021-07-30 RX ADMIN — SODIUM CHLORIDE, POTASSIUM CHLORIDE, SODIUM LACTATE AND CALCIUM CHLORIDE 1000 ML: 600; 310; 30; 20 INJECTION, SOLUTION INTRAVENOUS at 12:58

## 2021-07-30 RX ADMIN — ONDANSETRON 4 MG: 2 INJECTION INTRAMUSCULAR; INTRAVENOUS at 21:04

## 2021-07-30 RX ADMIN — ACETAMINOPHEN 650 MG: 325 TABLET, FILM COATED ORAL at 12:58

## 2021-07-30 RX ADMIN — ENOXAPARIN SODIUM 40 MG: 40 INJECTION SUBCUTANEOUS at 12:58

## 2021-07-30 RX ADMIN — ACETAMINOPHEN 650 MG: 325 TABLET, FILM COATED ORAL at 19:55

## 2021-07-30 RX ADMIN — MECLIZINE HYDROCHLORIDE 25 MG: 25 TABLET ORAL at 19:57

## 2021-07-30 RX ADMIN — MECLIZINE HYDROCHLORIDE 25 MG: 25 TABLET ORAL at 12:58

## 2021-07-30 ASSESSMENT — COGNITIVE AND FUNCTIONAL STATUS - GENERAL
SUGGESTED CMS G CODE MODIFIER MOBILITY: CH
DAILY ACTIVITIY SCORE: 24
SUGGESTED CMS G CODE MODIFIER DAILY ACTIVITY: CH
MOBILITY SCORE: 24

## 2021-07-30 ASSESSMENT — ENCOUNTER SYMPTOMS
SPEECH CHANGE: 0
FEVER: 0
PALPITATIONS: 1
VOMITING: 1
NAUSEA: 1
BLURRED VISION: 0
SHORTNESS OF BREATH: 0
DIZZINESS: 1
CONSTIPATION: 1
SORE THROAT: 0
SENSORY CHANGE: 0
FOCAL WEAKNESS: 0
ABDOMINAL PAIN: 1
DOUBLE VISION: 0
CHILLS: 1
FALLS: 0
WEIGHT LOSS: 1

## 2021-07-30 ASSESSMENT — LIFESTYLE VARIABLES
HAVE YOU EVER FELT YOU SHOULD CUT DOWN ON YOUR DRINKING: NO
ALCOHOL_USE: NO
EVER FELT BAD OR GUILTY ABOUT YOUR DRINKING: NO
ON A TYPICAL DAY WHEN YOU DRINK ALCOHOL HOW MANY DRINKS DO YOU HAVE: 0
TOTAL SCORE: 0
HOW MANY TIMES IN THE PAST YEAR HAVE YOU HAD 5 OR MORE DRINKS IN A DAY: 0
SUBSTANCE_ABUSE: 0
EVER HAD A DRINK FIRST THING IN THE MORNING TO STEADY YOUR NERVES TO GET RID OF A HANGOVER: NO
CONSUMPTION TOTAL: NEGATIVE
AVERAGE NUMBER OF DAYS PER WEEK YOU HAVE A DRINK CONTAINING ALCOHOL: 0
HAVE PEOPLE ANNOYED YOU BY CRITICIZING YOUR DRINKING: NO
TOTAL SCORE: 0
TOTAL SCORE: 0

## 2021-07-30 ASSESSMENT — FIBROSIS 4 INDEX
FIB4 SCORE: 1.37
FIB4 SCORE: 1.37

## 2021-07-30 ASSESSMENT — PATIENT HEALTH QUESTIONNAIRE - PHQ9
1. LITTLE INTEREST OR PLEASURE IN DOING THINGS: NOT AT ALL
2. FEELING DOWN, DEPRESSED, IRRITABLE, OR HOPELESS: NOT AT ALL
SUM OF ALL RESPONSES TO PHQ9 QUESTIONS 1 AND 2: 0

## 2021-07-30 ASSESSMENT — PAIN DESCRIPTION - PAIN TYPE
TYPE: ACUTE PAIN
TYPE: ACUTE PAIN

## 2021-07-30 NOTE — ED TRIAGE NOTES
Chief Complaint   Patient presents with   • Dizziness   • N/V      Pt  BIB [facility]. pt complains of dizziness, n/v, and loss of appetite for the past 4 days. Denies CP or SOB.

## 2021-07-30 NOTE — ASSESSMENT & PLAN NOTE
Thought likely related to decreased PO intake however worsening with IVF, SIADH? Labs mixed picture: high urine sodium but low urine osm, low serum osm, getting better with salt tabs and fluid restriction  IVF decreased, salt tabs 8/1  CTM    continue fluid restriction and salt tabs

## 2021-07-30 NOTE — PROGRESS NOTES
Received patient from ER. Patient ambulatory from [facility] to [facility]. Attached to portable telemetry monitor and oriented to room and use of call light. Admit profile completed.

## 2021-07-30 NOTE — H&P
"Hospital Medicine History & Physical Note    Date of Service  7/30/2021    Primary Care Physician  Yas Phelps M.D.    Consultants  None    Specialist Names: N/a    Code Status  No Order    Chief Complaint  Chief Complaint   Patient presents with   • Dizziness   • N/V       History of Presenting Illness  Karla Prince is a 75 y.o. female with history of osteoporosis, dyslipidemia, tobacco use who presented 7/30/2021 with vertigo, nausea/vomiting.    Patient was in her usual state of health until wednesday 7/28 in the am, got out of bed and developed acute dizziness and n/v.  She has since been having dry heaves, \"bile\" for the last few days.  Patient also reports she has developed new headache last 4 days (not normally headache person). \"skin hurts\", and reports chills but no fever.  Has had vertigo in the past if she's had the \"flu\" or \"cant eat\".  Patient also reports intermittent abdominal pain, LUQ, none now.  Her  reports he has noticed a gradual loss of appetite, lost her taste, after she had her second covid vaccine, tongue was numb, now better, can taste.  He also reports she has been sleeping a lot, all day long which is very abnormal for patient.  Does report she's had burning during urination. Smokes 4 cigarettes.      In the ED she was found to be afebrile, normal blood pressure and heart rate.  Labs remarkable for sodium 128.  Chest x-ray negative.'s abdominal x-ray negative.  CT head negative for acute intracranial findings does have white matter lucency most consistent with chronic small vessel ischemic change.    I discussed the plan of care with patient, family, bedside RN and ERP.    Review of Systems  Review of Systems   Constitutional: Positive for chills, malaise/fatigue and weight loss. Negative for fever.   HENT: Negative for congestion, hearing loss, sore throat and tinnitus.    Eyes: Negative for blurred vision and double vision.   Respiratory: Negative for shortness of breath.  "   Cardiovascular: Positive for palpitations. Negative for chest pain and leg swelling.   Gastrointestinal: Positive for abdominal pain, constipation, nausea and vomiting.   Genitourinary: Positive for dysuria.   Musculoskeletal: Negative for falls.   Skin: Negative for itching and rash.   Neurological: Positive for dizziness. Negative for sensory change, speech change and focal weakness.   Psychiatric/Behavioral: Negative for substance abuse.       Past Medical History   has a past medical history of Dyslipidemia, Impaired fasting blood sugar, Osteoporosis (3/16), and Osteoporosis.    Surgical History   has a past surgical history that includes hysterectomy, total abdominal and colonoscopy (05/09/2018).     Family History  family history includes Dementia in her mother; Diabetes in her father; Hyperlipidemia in her brother and sister.   Family history reviewed with patient. There is no family history that is pertinent to the chief complaint.     Social History   reports that she has been smoking cigarettes. She has a 13.00 pack-year smoking history. She has never used smokeless tobacco. She reports previous alcohol use. She reports that she does not use drugs.    Allergies  No Known Allergies    Medications  Prior to Admission Medications   Prescriptions Last Dose Informant Patient Reported? Taking?   Calcium Citrate (CITRACAL PO) 7/27/2021 at AM Patient Yes No   Sig: Take 2 Tablets by mouth every morning.   VITAMIN D PO 7/27/2021 at am Patient Yes No   Sig: Take 500 Units by mouth every morning.   acetaminophen (TYLENOL) 500 MG Tab 7/26/2021 at PRN Patient Yes Yes   Sig: Take 500-1,000 mg by mouth every 6 hours as needed for Moderate Pain (headache).   ibuprofen (MOTRIN) 200 MG Tab 7/28/2021 at PRN Patient Yes Yes   Sig: Take 400 mg by mouth every 6 hours as needed for Mild Pain or Headache.      Facility-Administered Medications: None       Physical Exam  Temp:  [36.5 °C (97.7 °F)] 36.5 °C (97.7 °F)  Pulse:   [66-75] 75  Resp:  [20] 20  BP: (138-163)/(58-66) 141/65  SpO2:  [93 %-98 %] 98 %    Physical Exam  Vitals and nursing note reviewed.   Constitutional:       General: She is not in acute distress.     Appearance: She is ill-appearing. She is not toxic-appearing.   HENT:      Head: Normocephalic and atraumatic.      Nose: Nose normal.      Mouth/Throat:      Mouth: Mucous membranes are dry.      Pharynx: Oropharynx is clear.   Eyes:      General: No scleral icterus.     Extraocular Movements: Extraocular movements intact.      Conjunctiva/sclera: Conjunctivae normal.      Pupils: Pupils are equal, round, and reactive to light.   Cardiovascular:      Rate and Rhythm: Normal rate and regular rhythm.      Heart sounds: No murmur heard.   No friction rub. No gallop.    Pulmonary:      Effort: Pulmonary effort is normal.      Breath sounds: Normal breath sounds.   Abdominal:      General: Abdomen is flat. Bowel sounds are normal. There is no distension.      Palpations: Abdomen is soft.      Tenderness: There is no abdominal tenderness.   Musculoskeletal:      Cervical back: Normal range of motion and neck supple.      Right lower leg: No edema.      Left lower leg: No edema.   Lymphadenopathy:      Cervical: No cervical adenopathy.   Skin:     Coloration: Skin is not jaundiced.      Findings: No rash.   Neurological:      Mental Status: She is alert and oriented to person, place, and time.      Cranial Nerves: No cranial nerve deficit.      Sensory: No sensory deficit.      Motor: No weakness.      Coordination: Coordination normal.      Comments: nystagmus   Psychiatric:         Mood and Affect: Mood normal.         Behavior: Behavior normal.         Laboratory:  Recent Labs     07/30/21  0931   WBC 10.7   RBC 4.36   HEMOGLOBIN 14.5   HEMATOCRIT 41.2   MCV 94.5   MCH 33.3*   MCHC 35.2*   RDW 39.7   PLATELETCT 213   MPV 10.3     Recent Labs     07/30/21  0931   SODIUM 128*   POTASSIUM 3.9   CHLORIDE 93*   CO2 24    GLUCOSE 122*   BUN 13   CREATININE 0.61   CALCIUM 9.1     Recent Labs     07/30/21  0931   ALTSGPT 14   ASTSGOT 15   ALKPHOSPHAT 56   TBILIRUBIN 0.6   LIPASE 28   GLUCOSE 122*         No results for input(s): NTPROBNP in the last 72 hours.      No results for input(s): TROPONINT in the last 72 hours.    Imaging:  DX-CHEST-PORTABLE (1 VIEW)   Final Result         1. No acute cardiopulmonary abnormalities are identified.      [id number] VIEW   Final Result         No specific finding to suggest small bowel obstruction.      CT-HEAD W/O   Final Result         1. No acute intracranial findings. No evidence of acute intracranial hemorrhage or mass lesion.      2. White matter lucencies most consistent with chronic small vessel ischemic change.               MR-BRAIN-W/O    (Results Pending)       EKG:  I have personally reviewed the images and compared with prior images.    Assessment/Plan:  I anticipate this patient will require at least two midnights for appropriate medical management, necessitating inpatient admission.    * Vertigo  Assessment & Plan  Patient presents with acute vertigo, n/v, headache and severe fatigue however 4 days ago, nystagmus but otherwise normal neuro exam, no TPA, no permissive HTN  Diff dx: stroke vs BPPV --> dehdyration vs UTI -->dehdyration  CT head negative  MRI brain  PT/OT  Lipid panel, a1c            Hyponatremia  Assessment & Plan  Likely in setting of no PO intake for 3+ days  IVF  CTM      VTE prophylaxis: enoxaparin ppx

## 2021-07-30 NOTE — ED PROVIDER NOTES
ED Provider Note    Chief Complaint:   Nausea, loss of appetite, disequilibrium    HPI:  Karla Prince is a very pleasant 75-year-old woman who presents to the emergency department for evaluation of nausea, loss of appetite, and disequilibrium.  Her symptoms all began about 3 to 4 days ago, describes onset of all symptoms simultaneously.  Additionally, about 4 to 5 days ago she developed a mild gradual onset headache, that has been persistent since that time.  No thunderclap onset is described, but she states she typically does not have headaches, and that this is unusual for her.  She describes dizziness, which she states is a combination of feeling lightheaded, as well as disequilibrium.  She states she is having some difficulty with ambulation due to a sensation of being off balance.  She is also had nausea with associated dry heaves, but she states she is not vomiting any large amounts of bile or fluid, because she has had loss of appetite and has not been eating or drinking well.  She does not have any associated fevers.  She has no associated chest pain, no shortness of breath, no diarrhea.  On review of systems she states she has had issues with constipation, and is noticed decreased stool output recently, though no significant abdominal pain or distention.  She is unable to identify any exacerbating or alleviating factors.  With regard to stroke risk factors, she does have a history of hyperlipidemia, as well as mild hypertension.  She has no cardiac history, and no history of CVA, nor TIA.  She has no history of atrial fibrillation, is not currently on any anticoagulation.  She is a regular smoker.    Review of Systems:  See HPI for pertinent positives and negatives. All other systems negative.    Past Medical History:   has a past medical history of Dyslipidemia, Impaired fasting blood sugar, Osteoporosis (3/16), and Osteoporosis.    Social History:  Social History     Tobacco Use   • Smoking status:  Current Every Day Smoker     Packs/day: 0.25     Years: 52.00     Pack years: 13.00     Types: Cigarettes   • Smokeless tobacco: Never Used   • Tobacco comment: dec from 1/2 ppd to 1/4 ppd since 2/16   Vaping Use   • Vaping Use: Never used   Substance and Sexual Activity   • Alcohol use: Not Currently     Alcohol/week: 0.0 oz     Comment: glass of wine with dinner   • Drug use: No   • Sexual activity: Yes     Partners: Male       Surgical History:   has a past surgical history that includes hysterectomy, total abdominal and colonoscopy (05/09/2018).    Current Medications:  Home Medications     Reviewed by Meli Dacosta (Pharmacy Tech) on 07/30/21 at 1126  Med List Status: Complete   Medication Last Dose Status   acetaminophen (TYLENOL) 500 MG Tab 7/26/2021 Active   Calcium Citrate (CITRACAL PO) 7/27/2021 Active   ibuprofen (MOTRIN) 200 MG Tab 7/28/2021 Active   VITAMIN D PO 7/27/2021 Active                Allergies:  No Known Allergies    Physical Exam:  Vital Signs: /68   Pulse 72   Temp 36.5 °C (97.7 °F) (Temporal)   Resp 20   SpO2 96%   Constitutional: Alert, no acute distress  HENT: Normocephalic, mask in place  Eyes: Pupils equal and reactive, normal conjunctiva, extraocular movements are intact, no nystagmus  Neck: Supple, normal range of motion, no stridor  Cardiovascular: Extremities are warm and well perfused, no murmur appreciated, normal cardiac auscultation  Pulmonary: No respiratory distress, normal work of breathing, no accessory muscule usage, breath sounds clear and equal bilaterally  Abdomen: Soft, non-distended, non-tender to palpation, no peritoneal signs  Skin: Warm, dry, no rashes or lesions  Musculoskeletal: Normal range of motion in all extremities, no swelling or deformity noted  Neurologic: CN II-XII intact, speech normal, muscle strength 5/5 in all four extremities, sensation grossly intact, normal finger-to-nose, no pronator drift  Psychiatric: Normal and appropriate mood  and affect    Medical records reviewed for continuity of care.  No recent visits for similar symptoms.  I did review her outpatient family medicine physician visit from 3/24/2021.  Noted to have a past medical history of hypertension that seems to be much more pronounced in the clinic setting.  Also noted to have a history of dyslipidemia, impaired fasting blood glucose, and osteopenia.  No acute concerns identified.    EKG: Rate 69, normal sinus rhythm, no ST elevation or depression, no T wave inversions, no ectopy, no significant change as compared to EKG performed 9/16/2019.    Labs:  Labs Reviewed   CBC WITH DIFFERENTIAL - Abnormal; Notable for the following components:       Result Value    MCH 33.3 (*)     MCHC 35.2 (*)     Neutrophils-Polys 88.40 (*)     Lymphocytes 6.30 (*)     Neutrophils (Absolute) 9.47 (*)     Lymphs (Absolute) 0.67 (*)     All other components within normal limits   COMP METABOLIC PANEL - Abnormal; Notable for the following components:    Sodium 128 (*)     Chloride 93 (*)     Glucose 122 (*)     All other components within normal limits   LIPASE   COV-2, FLU A/B, AND RSV BY PCR    Narrative:     Have you been in close contact with a person who is suspected  or known to be positive for COVID-19 within the last 30 days  (e.g. last seen that person < 30 days ago)->Unknown   ESTIMATED GFR   TROPONIN   URINALYSIS       Radiology:  DX-CHEST-PORTABLE (1 VIEW)   Final Result         1. No acute cardiopulmonary abnormalities are identified.      [id number] VIEW   Final Result         No specific finding to suggest small bowel obstruction.      CT-HEAD W/O   Final Result         1. No acute intracranial findings. No evidence of acute intracranial hemorrhage or mass lesion.      2. White matter lucencies most consistent with chronic small vessel ischemic change.               MR-BRAIN-W/O    (Results Pending)        ED Medications Administered:  Medications - No data to display    Differential  diagnosis:  Posterior circulation CVA, peripheral vertigo, central vertigo, electrolyte abnormality, dehydration, symptomatic anemia, cardiac arrhythmia    MDM:  Ms. Singer presents to the emergency department today for evaluation of 4 to 5 days of dizziness described as vertigo, with a sensation of lightheadedness as well. As symptoms have been ongoing for 4 to 5 days, she is not a candidate for alteplase, not a candidate for embolectomy, no indication for stroke activation.  On arrival to the emergency department he is not having any active chest pain.  EKG is reassuring without evidence of acute ischemic changes.  She has no focal deficits on neurologic exam.  Her vital signs are reassuring, she has no tachycardia, no hypotension, no fevers.  She has no meningeal signs, there is no evidence of Sirs or sepsis.    On laboratory evaluation sodium is 128, decrease significantly from 142 on 3/24/2021.  No other significant abnormalities.  Liver enzymes are all within normal limits, bilirubin within normal limits, no evidence of obstructive biliary pathology.  Lipase is within normal limits, no evidence of pancreatitis.  She is a normal white blood count, again less concerning for severe infectious etiology.  Hemoglobin is within normal limits, platelet count within normal limits.  Urinalysis pending at time of admission.  COVID-19 testing is negative.    CT head demonstrates no acute intracranial findings.  No evidence of acute intracranial hemorrhage or mass lesion noted.  Chest x-ray is negative for acute cardiopulmonary abnormalities.  Abdominal x-ray ordered due to concerns for decreased stool output.  No findings suggestive of bowel obstruction.    Plan at this time is for admission to hospitalist service for correction of hyponatremia, and further evaluation of loss of appetite, nausea, and disequilibrium.  Given her CVA risk factors of hypertension, hyperlipidemia, MRI is a consideration, as CT without contrast  is not sensitive to detect a posterior circulation infarction.  Additionally, she will require correction of hyponatremia, and possibly PT OT evaluation due to reported difficulty with ambulation.    Personal protective equipment including N95 surgical respirator, goggles, and gloves were used during this encounter.       Disposition:  Admit to hospitalist in stable condition    Final Impression:  1. Vertigo    2. Non-intractable vomiting with nausea, unspecified vomiting type    3. Hyponatremia        Electronically signed by: Aicha Dolan MD, 7/30/2021 11:55 AM

## 2021-07-30 NOTE — ED NOTES
Pharmacy Medication Reconciliation      ~Medication reconciliation updated and complete per pt at bedside  ~Allergies have been verified   ~No oral ABX within the last 30 days  ~Patient home pharmacy:[facility]

## 2021-07-30 NOTE — ASSESSMENT & PLAN NOTE
Improving  Patient presents with acute vertigo, n/v, headache and severe fatigue however 4 days ago, nystagmus but otherwise normal neuro exam, no TPA, no permissive HTN  Diff dx: stroke vs BPPV --> dehdyration vs UTI -->dehdyration  CT head negative  MRI brain negative for acute findings  PT/OT- home health   Lipid panel elevated

## 2021-07-31 ENCOUNTER — APPOINTMENT (OUTPATIENT)
Dept: RADIOLOGY | Facility: MEDICAL CENTER | Age: 76
DRG: 690 | End: 2021-07-31
Attending: INTERNAL MEDICINE
Payer: MEDICARE

## 2021-07-31 PROBLEM — R11.2 INTRACTABLE VOMITING WITH NAUSEA: Status: ACTIVE | Noted: 2021-07-31

## 2021-07-31 PROBLEM — R50.9 FEVER: Status: ACTIVE | Noted: 2021-07-31

## 2021-07-31 LAB
ANION GAP SERPL CALC-SCNC: 17 MMOL/L (ref 7–16)
BASOPHILS # BLD AUTO: 0.5 % (ref 0–1.8)
BASOPHILS # BLD: 0.04 K/UL (ref 0–0.12)
BUN SERPL-MCNC: 13 MG/DL (ref 8–22)
CALCIUM SERPL-MCNC: 9.5 MG/DL (ref 8.4–10.2)
CHLORIDE SERPL-SCNC: 90 MMOL/L (ref 96–112)
CHOLEST SERPL-MCNC: 258 MG/DL (ref 100–199)
CO2 SERPL-SCNC: 21 MMOL/L (ref 20–33)
CREAT SERPL-MCNC: 0.55 MG/DL (ref 0.5–1.4)
CRP SERPL HS-MCNC: <0.3 MG/DL (ref 0–0.75)
EOSINOPHIL # BLD AUTO: 0.01 K/UL (ref 0–0.51)
EOSINOPHIL NFR BLD: 0.1 % (ref 0–6.9)
ERYTHROCYTE [DISTWIDTH] IN BLOOD BY AUTOMATED COUNT: 39.3 FL (ref 35.9–50)
GLUCOSE SERPL-MCNC: 99 MG/DL (ref 65–99)
HCT VFR BLD AUTO: 38.9 % (ref 37–47)
HDLC SERPL-MCNC: 55 MG/DL
HGB BLD-MCNC: 13.6 G/DL (ref 12–16)
IMM GRANULOCYTES # BLD AUTO: 0.04 K/UL (ref 0–0.11)
IMM GRANULOCYTES NFR BLD AUTO: 0.5 % (ref 0–0.9)
LACTATE BLD-SCNC: 1.1 MMOL/L (ref 0.5–2)
LACTATE BLD-SCNC: 1.3 MMOL/L (ref 0.5–2)
LDLC SERPL CALC-MCNC: 156 MG/DL
LYMPHOCYTES # BLD AUTO: 1.15 K/UL (ref 1–4.8)
LYMPHOCYTES NFR BLD: 13.2 % (ref 22–41)
MCH RBC QN AUTO: 33.1 PG (ref 27–33)
MCHC RBC AUTO-ENTMCNC: 35 G/DL (ref 33.6–35)
MCV RBC AUTO: 94.6 FL (ref 81.4–97.8)
MONOCYTES # BLD AUTO: 0.81 K/UL (ref 0–0.85)
MONOCYTES NFR BLD AUTO: 9.3 % (ref 0–13.4)
NEUTROPHILS # BLD AUTO: 6.67 K/UL (ref 2–7.15)
NEUTROPHILS NFR BLD: 76.4 % (ref 44–72)
NRBC # BLD AUTO: 0 K/UL
NRBC BLD-RTO: 0 /100 WBC
NT-PROBNP SERPL IA-MCNC: 324 PG/ML (ref 0–125)
PLATELET # BLD AUTO: 216 K/UL (ref 164–446)
PMV BLD AUTO: 10.8 FL (ref 9–12.9)
POTASSIUM SERPL-SCNC: 4 MMOL/L (ref 3.6–5.5)
PROCALCITONIN SERPL-MCNC: 0.1 NG/ML
RBC # BLD AUTO: 4.11 M/UL (ref 4.2–5.4)
SODIUM SERPL-SCNC: 128 MMOL/L (ref 135–145)
TRIGL SERPL-MCNC: 234 MG/DL (ref 0–149)
TSH SERPL DL<=0.005 MIU/L-ACNC: 2.08 UIU/ML (ref 0.38–5.33)
WBC # BLD AUTO: 8.7 K/UL (ref 4.8–10.8)

## 2021-07-31 PROCEDURE — 97165 OT EVAL LOW COMPLEX 30 MIN: CPT

## 2021-07-31 PROCEDURE — 84145 PROCALCITONIN (PCT): CPT

## 2021-07-31 PROCEDURE — A9270 NON-COVERED ITEM OR SERVICE: HCPCS | Performed by: INTERNAL MEDICINE

## 2021-07-31 PROCEDURE — 83605 ASSAY OF LACTIC ACID: CPT | Mod: 91

## 2021-07-31 PROCEDURE — 74177 CT ABD & PELVIS W/CONTRAST: CPT | Mod: ME

## 2021-07-31 PROCEDURE — 86140 C-REACTIVE PROTEIN: CPT

## 2021-07-31 PROCEDURE — 85025 COMPLETE CBC W/AUTO DIFF WBC: CPT

## 2021-07-31 PROCEDURE — 700105 HCHG RX REV CODE 258: Performed by: INTERNAL MEDICINE

## 2021-07-31 PROCEDURE — 84443 ASSAY THYROID STIM HORMONE: CPT

## 2021-07-31 PROCEDURE — 700102 HCHG RX REV CODE 250 W/ 637 OVERRIDE(OP): Performed by: INTERNAL MEDICINE

## 2021-07-31 PROCEDURE — 700111 HCHG RX REV CODE 636 W/ 250 OVERRIDE (IP): Performed by: INTERNAL MEDICINE

## 2021-07-31 PROCEDURE — 51798 US URINE CAPACITY MEASURE: CPT

## 2021-07-31 PROCEDURE — 700117 HCHG RX CONTRAST REV CODE 255: Performed by: INTERNAL MEDICINE

## 2021-07-31 PROCEDURE — 770020 HCHG ROOM/CARE - TELE (206)

## 2021-07-31 PROCEDURE — 80048 BASIC METABOLIC PNL TOTAL CA: CPT

## 2021-07-31 PROCEDURE — 83880 ASSAY OF NATRIURETIC PEPTIDE: CPT

## 2021-07-31 PROCEDURE — 80061 LIPID PANEL: CPT

## 2021-07-31 PROCEDURE — 87040 BLOOD CULTURE FOR BACTERIA: CPT | Mod: 91

## 2021-07-31 PROCEDURE — 99233 SBSQ HOSP IP/OBS HIGH 50: CPT | Performed by: INTERNAL MEDICINE

## 2021-07-31 PROCEDURE — 94760 N-INVAS EAR/PLS OXIMETRY 1: CPT

## 2021-07-31 RX ORDER — ATORVASTATIN CALCIUM 40 MG/1
40 TABLET, FILM COATED ORAL EVERY EVENING
Status: DISCONTINUED | OUTPATIENT
Start: 2021-07-31 | End: 2021-08-06 | Stop reason: HOSPADM

## 2021-07-31 RX ORDER — LORAZEPAM 2 MG/ML
0.5 INJECTION INTRAMUSCULAR EVERY 4 HOURS PRN
Status: DISCONTINUED | OUTPATIENT
Start: 2021-07-31 | End: 2021-08-06 | Stop reason: HOSPADM

## 2021-07-31 RX ORDER — SODIUM CHLORIDE 9 MG/ML
INJECTION, SOLUTION INTRAVENOUS CONTINUOUS
Status: DISCONTINUED | OUTPATIENT
Start: 2021-07-31 | End: 2021-08-01

## 2021-07-31 RX ORDER — KETOROLAC TROMETHAMINE 30 MG/ML
15 INJECTION, SOLUTION INTRAMUSCULAR; INTRAVENOUS ONCE
Status: COMPLETED | OUTPATIENT
Start: 2021-07-31 | End: 2021-07-31

## 2021-07-31 RX ORDER — SODIUM CHLORIDE 9 MG/ML
500 INJECTION, SOLUTION INTRAVENOUS ONCE
Status: COMPLETED | OUTPATIENT
Start: 2021-07-31 | End: 2021-07-31

## 2021-07-31 RX ADMIN — KETOROLAC TROMETHAMINE 15 MG: 30 INJECTION, SOLUTION INTRAMUSCULAR; INTRAVENOUS at 18:05

## 2021-07-31 RX ADMIN — BISACODYL 10 MG: 10 SUPPOSITORY RECTAL at 13:29

## 2021-07-31 RX ADMIN — ENOXAPARIN SODIUM 40 MG: 40 INJECTION SUBCUTANEOUS at 05:34

## 2021-07-31 RX ADMIN — SODIUM CHLORIDE: 9 INJECTION, SOLUTION INTRAVENOUS at 17:03

## 2021-07-31 RX ADMIN — ATORVASTATIN CALCIUM 40 MG: 40 TABLET, FILM COATED ORAL at 17:03

## 2021-07-31 RX ADMIN — ASPIRIN 325 MG ORAL TABLET 325 MG: 325 PILL ORAL at 05:34

## 2021-07-31 RX ADMIN — IOHEXOL 100 ML: 350 INJECTION, SOLUTION INTRAVENOUS at 11:30

## 2021-07-31 RX ADMIN — ACETAMINOPHEN 650 MG: 325 TABLET, FILM COATED ORAL at 04:15

## 2021-07-31 RX ADMIN — CEFTRIAXONE SODIUM 1 G: 1 INJECTION, POWDER, FOR SOLUTION INTRAMUSCULAR; INTRAVENOUS at 08:58

## 2021-07-31 RX ADMIN — ACETAMINOPHEN 650 MG: 325 TABLET, FILM COATED ORAL at 13:35

## 2021-07-31 RX ADMIN — SODIUM CHLORIDE 500 ML: 9 INJECTION, SOLUTION INTRAVENOUS at 07:58

## 2021-07-31 RX ADMIN — SODIUM CHLORIDE: 9 INJECTION, SOLUTION INTRAVENOUS at 08:58

## 2021-07-31 ASSESSMENT — ACTIVITIES OF DAILY LIVING (ADL): TOILETING: INDEPENDENT

## 2021-07-31 ASSESSMENT — ENCOUNTER SYMPTOMS
COUGH: 0
DIZZINESS: 1
NAUSEA: 1
FEVER: 1
SHORTNESS OF BREATH: 0
FALLS: 0
CHILLS: 1
DIARRHEA: 0
VOMITING: 1
BLURRED VISION: 0
ABDOMINAL PAIN: 0
SORE THROAT: 0
CONSTIPATION: 1

## 2021-07-31 ASSESSMENT — PAIN DESCRIPTION - PAIN TYPE
TYPE: ACUTE PAIN

## 2021-07-31 ASSESSMENT — COGNITIVE AND FUNCTIONAL STATUS - GENERAL
TOILETING: A LITTLE
SUGGESTED CMS G CODE MODIFIER DAILY ACTIVITY: CJ
HELP NEEDED FOR BATHING: A LITTLE
DAILY ACTIVITIY SCORE: 21
DRESSING REGULAR LOWER BODY CLOTHING: A LITTLE

## 2021-07-31 ASSESSMENT — FIBROSIS 4 INDEX
FIB4 SCORE: 1.37
FIB4 SCORE: 1.35

## 2021-07-31 NOTE — ASSESSMENT & PLAN NOTE
Patient spiked fever overnight, will check labs, get bcx and start empiric CTX (source likely urine since did have some dysuria a few days ago, now improved though and UA not that impressive and had a lot of epithelial cells).  Did not have any elevated WBC yesterday.  No other SIRS criteria yesterday or overnight except temp.  Inflammatory markers and procal negative.   - Ucx growing GBS  - bcx NGTD  - IVF  - finished 3 day course of ceftriaxone   - discussed with ID ok to monitor off abx for now given fevers have resolved

## 2021-07-31 NOTE — CARE PLAN
Problem: Nutritional:  Goal: Achieve adequate nutritional intake  Description: Patient will consume >/=50% of meals and/or supplements  Outcome: Not Met   See RD note

## 2021-07-31 NOTE — HOSPITAL COURSE
"Karla Prince is a 75 y.o. female with history of osteoporosis, dyslipidemia, tobacco use who presented 7/30/2021 with vertigo, nausea/vomiting.     Patient was in her usual state of health until wednesday 7/28 in the am, got out of bed and developed acute dizziness and n/v.  She has since been having dry heaves, \"bile\" for the last few days.  Patient also reports she has developed new headache last 4 days (not normally headache person). \"skin hurts\", and reports chills but no fever.  Has had vertigo in the past if she's had the \"flu\" or \"cant eat\".  Patient also reports intermittent abdominal pain, LUQ, none now.  Her  reports he has noticed a gradual loss of appetite, lost her taste, after she had her second covid vaccine, tongue was numb, now better, can taste.  He also reports she has been sleeping a lot, all day long which is very abnormal for patient.  Does report she's had burning during urination. Smokes 4 cigarettes.       In the ED she was found to be afebrile, normal blood pressure and heart rate.  Labs remarkable for sodium 128.  Chest x-ray negative.'s abdominal x-ray negative.  CT head negative for acute intracranial findings does have white matter lucency most consistent with chronic small vessel ischemic change.    MRI brain negative for stroke.  She spiked a fever overnight on day of admission to 102F.  Blood cultures obtained.  Urine grew GBS.  She was started on CTX.      She had persistent nausea/vomiting so CT AP obtained which showed very distended bladder and constipation.  Lantigua was placed and bowel regimen increased.      Her sodium level decreased despite IVF. Salt tabs started 8/1.      On 8/2 she began having fevers again.  "

## 2021-07-31 NOTE — PROGRESS NOTES
Received bedside patient report from SAIRA Green. Patient resting comfortably in bed, no complaints at this time. Safety precautions in place. Will continue to monitor.

## 2021-07-31 NOTE — THERAPY
Occupational Therapy   Initial Evaluation     Patient Name: Karla Prince  Age:  75 y.o., Sex:  female  Medical Record #: [id number]  Today's Date: 7/31/2021     Precautions: Fall Risk    Assessment  Patient is 75 y.o. female admitted with vertigo, N/V, wt loss. MRI is negative for stroke. CT head- neg. Pt presents in bed, A&Ox3. C/o B inner ear pain, headache, shivering. Warm blankets in place. Agreeable to activity; poor sit/stand tolerance due to dizziness. Sup with functional mobility at bedside; unable to transfer to chair. Vision- WFL. BUE AROM, sensation, FMC,strength- WFL. Pt is Indep with I/ADL's prior; lives with spouse. Pt is not currently at her baseline; OT will follow while in house.        Plan  Recommend Occupational Therapy 3 times per week until therapy goals are met for the following treatments:  Neuro Re-Education / Balance, Self Care/Activities of Daily Living and Therapeutic Activities.    DC Equipment Recommendations: Unable to determine at this time  Discharge Recommendations: Anticipate that the patient will have no further occupational therapy needs after discharge from the hospital      07/31/21 1209   Prior Living Situation   Prior Services None   Housing / Facility 1 Story House   Bathroom Set up Walk In Shower;Grab Bars;Shower Chair   Equipment Owned Single Point Cane;Tub / Shower Seat;Grab Bar(s) In Tub / Shower;Grab Bar(s) By Toilet;Hand Held Shower   Lives with - Patient's Self Care Capacity Spouse   Comments active prior   Prior Level of ADL Function   Self Feeding Independent   Grooming / Hygiene Independent   Bathing Independent   Dressing Independent   Toileting Independent   Prior Level of IADL Function   Medication Management Independent   Laundry Independent   Kitchen Mobility Independent   Finances Independent   Home Management Independent   Shopping Independent   Prior Level Of Mobility Independent Without Device in Home   Driving / Transportation Driving Independent    Occupation (Pre-Hospital Vocational) Retired Due To Age   Leisure Interests Shopping;Hobbies;Exercise   Balance Assessment   Sitting Balance (Static) Fair +   Sitting Balance (Dynamic) Fair +   Standing Balance (Static) Fair +   Standing Balance (Dynamic) Fair   Weight Shift Sitting Fair   Weight Shift Standing Fair   Bed Mobility    Supine to Sit Supervised   Sit to Supine Supervised   Scooting Supervised   ADL Assessment   Eating Independent   Lower Body Dressing   (pt reports indep w/ pants prior (remain on))   Toileting   (cna completed bladder scan )   Comments poor activity tolerance for ADL's.   Functional Mobility   Sit to Stand Supervised   Comments only tolerates 2 side-steps at bedside   Interdisciplinary Plan of Care Collaboration   Collaboration Comments Aware of visit. Pt c/o headache, B ear pain.

## 2021-07-31 NOTE — CARE PLAN
The patient is Stable - Low risk of patient condition declining or worsening    Shift Goals  Clinical Goals: Stable vital signs, no fever  Patient Goals: Rest  Family Goals: Receive update from MD    Progress made toward(s) clinical / shift goals:  yes      Patient is not progressing towards the following goals: n/a    Problem: Knowledge Deficit - Standard  Goal: Patient and family/care givers will demonstrate understanding of plan of care, disease process/condition, diagnostic tests and medications  Outcome: Progressing     Problem: Pain - Standard  Goal: Alleviation of pain or a reduction in pain to the patient’s comfort goal  Outcome: Progressing     Problem: Fall Risk  Goal: Patient will remain free from falls  Outcome: Progressing

## 2021-07-31 NOTE — CARE PLAN
The patient is Stable - Low risk of patient condition declining or worsening    Shift Goals  Clinical Goals: Free from Falls and Injury  Patient Goals: Rest and Sleep    Progress made toward(s) clinical / shift goals:    Problem: Knowledge Deficit - Standard  Goal: Patient and family/care givers will demonstrate understanding of plan of care, disease process/condition, diagnostic tests and medications  Outcome: Progressing  Note: Patient and family updated on the plan of care. Encouraged to voice feelings and to ask questions. Answered all questions and concerns. Agrees with the plan of care.

## 2021-07-31 NOTE — PROGRESS NOTES
"Hospital Medicine Daily Progress Note    Date of Service  7/31/2021    Chief Complaint  Karla Prince is a 75 y.o. female admitted 7/30/2021 with vertigo, headache, nausea/vomiting    Hospital Course  Karla Prince is a 75 y.o. female with history of osteoporosis, dyslipidemia, tobacco use who presented 7/30/2021 with vertigo, nausea/vomiting.     Patient was in her usual state of health until wednesday 7/28 in the am, got out of bed and developed acute dizziness and n/v.  She has since been having dry heaves, \"bile\" for the last few days.  Patient also reports she has developed new headache last 4 days (not normally headache person). \"skin hurts\", and reports chills but no fever.  Has had vertigo in the past if she's had the \"flu\" or \"cant eat\".  Patient also reports intermittent abdominal pain, LUQ, none now.  Her  reports he has noticed a gradual loss of appetite, lost her taste, after she had her second covid vaccine, tongue was numb, now better, can taste.  He also reports she has been sleeping a lot, all day long which is very abnormal for patient.  Does report she's had burning during urination. Smokes 4 cigarettes.       In the ED she was found to be afebrile, normal blood pressure and heart rate.  Labs remarkable for sodium 128.  Chest x-ray negative.'s abdominal x-ray negative.  CT head negative for acute intracranial findings does have white matter lucency most consistent with chronic small vessel ischemic change.    MRI brain negative for stroke.  She spiked a fever overnight on day of admission to 102F.  Blood cultures obtained.  Urine cx pending.       Interval Problem Update  - spiked a fever overnight to 102F, getting labs/blood cx, starting on empiric CTX, giving more IVF  - Still not able to eat anything, no appetite, persistent nausea, will get CT AP and send H. Pylori (not on PPI)  - still having intermittent vertigo, does improve a little with meclizine  - high cholesterol and BP, started " on statin  - Na 128 still    I have personally seen and examined the patient at bedside. I discussed the plan of care with patient.    Consultants/Specialty  n/a    Code Status  DNAR/DNI    Disposition  Patient is not medically cleared.   Anticipate discharge to to home with close outpatient follow-up.  I have placed the appropriate orders for post-discharge needs.    Review of Systems  Review of Systems   Constitutional: Positive for chills, fever and malaise/fatigue.   HENT: Negative for congestion and sore throat.    Eyes: Negative for blurred vision.   Respiratory: Negative for cough and shortness of breath.    Cardiovascular: Negative for chest pain and leg swelling.   Gastrointestinal: Positive for constipation, nausea and vomiting. Negative for abdominal pain and diarrhea.   Genitourinary: Positive for dysuria (now improved per patient).   Musculoskeletal: Negative for falls.   Skin: Negative for rash.   Neurological: Positive for dizziness.        Physical Exam  Temp:  [36.5 °C (97.7 °F)-39.1 °C (102.3 °F)] 37.3 °C (99.1 °F)  Pulse:  [66-83] 82  Resp:  [17-20] 17  BP: (131-180)/(58-73) 162/69  SpO2:  [93 %-98 %] 93 %    Physical Exam  Vitals and nursing note reviewed.   Constitutional:       General: She is not in acute distress.     Appearance: She is ill-appearing. She is not toxic-appearing or diaphoretic.   HENT:      Head: Normocephalic and atraumatic.      Nose: Nose normal.      Mouth/Throat:      Mouth: Mucous membranes are moist.   Eyes:      General: No scleral icterus.     Conjunctiva/sclera: Conjunctivae normal.   Cardiovascular:      Rate and Rhythm: Normal rate and regular rhythm.      Heart sounds: No murmur heard.   No friction rub. No gallop.    Pulmonary:      Effort: Pulmonary effort is normal.      Breath sounds: Normal breath sounds.   Abdominal:      General: Abdomen is flat. Bowel sounds are normal. There is no distension.      Palpations: Abdomen is soft.      Tenderness: There is no  abdominal tenderness.   Musculoskeletal:      Cervical back: Normal range of motion.      Right lower leg: No edema.      Left lower leg: No edema.   Skin:     General: Skin is warm.      Coloration: Skin is not jaundiced.      Findings: No rash.   Neurological:      Mental Status: She is alert and oriented to person, place, and time.   Psychiatric:         Mood and Affect: Mood normal.         Behavior: Behavior normal.         Fluids    Intake/Output Summary (Last 24 hours) at 7/31/2021 0727  Last data filed at 7/30/2021 1800  Gross per 24 hour   Intake 1100 ml   Output 250 ml   Net 850 ml       Laboratory  Recent Labs     07/30/21  0931   WBC 10.7   RBC 4.36   HEMOGLOBIN 14.5   HEMATOCRIT 41.2   MCV 94.5   MCH 33.3*   MCHC 35.2*   RDW 39.7   PLATELETCT 213   MPV 10.3     Recent Labs     07/30/21  0931 07/31/21  0144   SODIUM 128* 128*   POTASSIUM 3.9 4.0   CHLORIDE 93* 90*   CO2 24 21   GLUCOSE 122* 99   BUN 13 13   CREATININE 0.61 0.55   CALCIUM 9.1 9.5             Recent Labs     07/31/21  0144   TRIGLYCERIDE 234*   HDL 55   *       Imaging  MR-BRAIN-W/O   Final Result      1.  No acute abnormality.   2.  Mild cerebral volume loss.   3.  Mild chronic microvascular ischemic disease.      DX-CHEST-PORTABLE (1 VIEW)   Final Result         1. No acute cardiopulmonary abnormalities are identified.      [id number] VIEW   Final Result         No specific finding to suggest small bowel obstruction.      CT-HEAD W/O   Final Result         1. No acute intracranial findings. No evidence of acute intracranial hemorrhage or mass lesion.      2. White matter lucencies most consistent with chronic small vessel ischemic change.               EC-ECHOCARDIOGRAM COMPLETE W/O CONT    (Results Pending)        Assessment/Plan  * Vertigo  Assessment & Plan  Patient presents with acute vertigo, n/v, headache and severe fatigue however 4 days ago, nystagmus but otherwise normal neuro exam, no TPA, no permissive HTN  Diff dx:  stroke vs BPPV --> dehdyration vs UTI -->dehdyration  CT head negative  MRI brain  PT/OT  Lipid panel, a1c            Intractable vomiting with nausea  Assessment & Plan  Unsure etiology, could be related to possible UTI, MRI brain negative (noncon)  Benign abdominal exam but given fevers and n/v will get CT AP  F/u ucx and bcx  Nausea meds  IVF    Fever  Assessment & Plan  Patient spiked fever overnight, will check labs, get bcx and start empiric CTX (source likely urine since did have some dysuria a few days ago, now improved though and UA not that impressive and had a lot of epithelial cells).  Did not have any elevated WBC yesterday.  No other SIRS criteria yesterday or overnight except temp.    - f/u ucx and bcx  - IVF  - CTX 7/31-**    Hyponatremia  Assessment & Plan  Likely in setting of no PO intake for 3+ days  IVF  CTM       VTE prophylaxis: enoxaparin ppx    I have performed a physical exam and reviewed and updated ROS and Plan today (7/31/2021). In review of yesterday's note (7/30/2021), there are no changes except as documented above.

## 2021-07-31 NOTE — DIETARY
"Nutrition services: Day 1 of admit.  Karla Prince is a 75 y.o. female with admitting DX of vertigo.   Consult received for poor PO intake, wt loss.     RD met with pt and pt's  at bedside to discuss wt/PO hx. Pt's  reported that pt has had minimal PO intake for the past few days pta and her appetite/PO has been progressively decreasing for the past few weeks. Notes she has been unable to eat since admit still. Reports pt lost taste with COVID vaccine and has not wanted to eat much since. Pt's  reported pt weighed 113 lb on stand up scale at home 2-3 days ago. Reports wt was 119 lb ~1 month ago. When questioned regarding changes in appearance pt stated she \"has not looked at herself recently\", pt's  did note he could tell pt appeared thinner now and wt loss was visibly noticeable. Pt agreed to try high protein juice drinks, yogurt smoothies, and saltine crackers on all meal trays.     Pt's  expressed concern over intake of high protein concentrated supplement drinks after pt has not eaten anything in several days, questioned if this would be a \"shock to her system\". RD confirmed that if pt began to consume large amounts of foods/supplements suddenly then would want to monitor her electrolytes, however, suspect pt's intake will increase gradually and thus low concern for this. RD assured  and pt that we will be monitoring her PO intake and will monitor electrolytes/labs prn.     Assessment:  Height: 157.5 cm (5' 2\")  Weight: 54 kg (119 lb 0.8 oz) via bed scale today.   Body mass index is 21.77 kg/m²., BMI classification: normal/low for age   Diet/Intake: Regular; PO intake per flowsheets 0% x1 meal so far.     Evaluation:   1. Pt presented with dizziness, N/V, dry heaving x4 days pta.  2. Problem list/PMHx includes: intractable nausea/vomiting, vertigo, hyponatremia, fever, osteoporosis, dyslipidemia   3. Per chart review pt wt hx as follows:  · 7/28: 113 lb via stand up scale " "at home (per )  · 3/2021: 119 lb   · 2/2021: 123 lb  · 3/2020: 132 lb  · Suspect wt at home to be most accurate. Pt with significant 5% wt loss x1 month, 8.1% wt loss x5 months, which is notable though not significant for malnutrition  4. Pt lying in bed covered in blankets with mask on. Unable to visualize body habitus and muscle/fat wasting.   5. Per 's report, pt has been eating <50% of normal for >1 week now.   6. Per flowsheets pt \"constipated\" - per MAR refused pericolace today  7. Pt to benefit from high protein supplements, yogurt drinks, and crackers with meals.     Malnutrition Risk: Pt with moderate acute illness malnutrition r/t decreased appetite, loss of taste, and N/V as evidenced by PO <50% of normal for >1 week and significant 5% wt loss x1 month.     Recommendations/Plan:  1. RD will add Boost Breeze, Chobani yogurt smoothies, saltine crackers TID w/ meals.    2. Encourage intake of meals, supplements   3. Document intake of all meals, supplements as % taken in ADL's to provide interdisciplinary communication across all shifts.   4. Monitor weight.  5. Nutrition rep will continue to see patient for ongoing meal and snack preferences.     RD will follow.       "

## 2021-07-31 NOTE — PROGRESS NOTES
Bladder scan results 816mL after voiding. Patient with dysuria/burning on urination. MD made aware, order for straight catheter insertion/removal x1, followed by Q6 bladder scans. Will place frankel later if urine residuals remain >300 in ordinance with MD order.    Update @ 1300: patient successfully straight cathed -575mL clear yellow urine.

## 2021-07-31 NOTE — PROGRESS NOTES
Denies any headache, nausea, or vertigo at this time. Symptoms resolved after administration of ANTIVERT.

## 2021-07-31 NOTE — PROGRESS NOTES
Telemetry Shift Summary    Rhythm SR  HR Range 60s-70s  Ectopy n/a  Measurements 0.16/0.10/0.38        Normal Values  Rhythm SR  HR Range    Measurements 0.12-0.20 / 0.06-0.10  / 0.30-0.52

## 2021-07-31 NOTE — ASSESSMENT & PLAN NOTE
Vomiting improved but still with nausea and poor po intake  Unsure etiology, could be related to possible UTI vs constipation vs gastritis/PUD vs malignancy? Getting MRCP today although nothing on CT AP  MRI brain negative (noncon), CT AP showed urinary retention and constipation  Nausea meds  H. Pylori, started PPI   IVF  Dietician consult  If no improvement in next 1-2 days will likely need cortrak placed for enteral nutrition as it's been ~5-6 days of very poor po intake  GI consulted, s/p MRCP and EGD   -omeprazole BID   -f/u h. Pylori results

## 2021-07-31 NOTE — PROGRESS NOTES
Telemetry Shift Summary     Rhythm SR  HR Range 65 - 78  Measurements 0.20 / 0.10 / 0.40           Normal Values  Rhythm SR  HR Range    Measurements 0.12-0.20 / 0.06-0.10  / 0.30-0.52

## 2021-08-01 ENCOUNTER — APPOINTMENT (OUTPATIENT)
Dept: CARDIOLOGY | Facility: MEDICAL CENTER | Age: 76
DRG: 690 | End: 2021-08-01
Attending: INTERNAL MEDICINE
Payer: MEDICARE

## 2021-08-01 PROBLEM — R33.9 URINARY RETENTION: Status: ACTIVE | Noted: 2021-08-01

## 2021-08-01 PROBLEM — N30.00 ACUTE CYSTITIS WITHOUT HEMATURIA: Status: ACTIVE | Noted: 2021-07-31

## 2021-08-01 LAB
ALBUMIN SERPL BCP-MCNC: 4 G/DL (ref 3.2–4.9)
ALP SERPL-CCNC: 47 U/L (ref 30–99)
ALT SERPL-CCNC: 17 U/L (ref 2–50)
ANION GAP SERPL CALC-SCNC: 14 MMOL/L (ref 7–16)
AST SERPL-CCNC: 34 U/L (ref 12–45)
BACTERIA UR CULT: ABNORMAL
BACTERIA UR CULT: ABNORMAL
BASOPHILS # BLD AUTO: 0.6 % (ref 0–1.8)
BASOPHILS # BLD: 0.06 K/UL (ref 0–0.12)
BILIRUB CONJ SERPL-MCNC: <0.2 MG/DL (ref 0.1–0.5)
BILIRUB INDIRECT SERPL-MCNC: NORMAL MG/DL (ref 0–1)
BILIRUB SERPL-MCNC: 0.6 MG/DL (ref 0.1–1.5)
BUN SERPL-MCNC: 9 MG/DL (ref 8–22)
CALCIUM SERPL-MCNC: 8.5 MG/DL (ref 8.4–10.2)
CHLORIDE SERPL-SCNC: 90 MMOL/L (ref 96–112)
CO2 SERPL-SCNC: 19 MMOL/L (ref 20–33)
CORTIS SERPL-MCNC: 31.5 UG/DL (ref 0–23)
CREAT SERPL-MCNC: 0.44 MG/DL (ref 0.5–1.4)
EOSINOPHIL # BLD AUTO: 0.01 K/UL (ref 0–0.51)
EOSINOPHIL NFR BLD: 0.1 % (ref 0–6.9)
ERYTHROCYTE [DISTWIDTH] IN BLOOD BY AUTOMATED COUNT: 38.5 FL (ref 35.9–50)
GLUCOSE SERPL-MCNC: 92 MG/DL (ref 65–99)
HCT VFR BLD AUTO: 40.7 % (ref 37–47)
HGB BLD-MCNC: 14.6 G/DL (ref 12–16)
IMM GRANULOCYTES # BLD AUTO: 0.03 K/UL (ref 0–0.11)
IMM GRANULOCYTES NFR BLD AUTO: 0.3 % (ref 0–0.9)
LV EJECT FRACT  99904: 65
LV EJECT FRACT MOD 2C 99903: 56.71
LV EJECT FRACT MOD 4C 99902: 71.88
LV EJECT FRACT MOD BP 99901: 64.92
LYMPHOCYTES # BLD AUTO: 0.95 K/UL (ref 1–4.8)
LYMPHOCYTES NFR BLD: 9.9 % (ref 22–41)
MAGNESIUM SERPL-MCNC: 1.7 MG/DL (ref 1.5–2.5)
MCH RBC QN AUTO: 33.3 PG (ref 27–33)
MCHC RBC AUTO-ENTMCNC: 35.9 G/DL (ref 33.6–35)
MCV RBC AUTO: 92.9 FL (ref 81.4–97.8)
MONOCYTES # BLD AUTO: 0.75 K/UL (ref 0–0.85)
MONOCYTES NFR BLD AUTO: 7.9 % (ref 0–13.4)
NEUTROPHILS # BLD AUTO: 7.75 K/UL (ref 2–7.15)
NEUTROPHILS NFR BLD: 81.2 % (ref 44–72)
NRBC # BLD AUTO: 0 K/UL
NRBC BLD-RTO: 0 /100 WBC
OSMOLALITY SERPL: 260 MOSM/KG H2O (ref 278–298)
OSMOLALITY UR: 228 MOSM/KG H2O (ref 300–900)
PHOSPHATE SERPL-MCNC: 2.1 MG/DL (ref 2.5–4.5)
PLATELET # BLD AUTO: 183 K/UL (ref 164–446)
PMV BLD AUTO: 11.3 FL (ref 9–12.9)
POTASSIUM SERPL-SCNC: 3.2 MMOL/L (ref 3.6–5.5)
PROT SERPL-MCNC: 6.5 G/DL (ref 6–8.2)
RBC # BLD AUTO: 4.38 M/UL (ref 4.2–5.4)
SIGNIFICANT IND 70042: ABNORMAL
SITE SITE: ABNORMAL
SODIUM SERPL-SCNC: 123 MMOL/L (ref 135–145)
SODIUM SERPL-SCNC: 123 MMOL/L (ref 135–145)
SODIUM SERPL-SCNC: 124 MMOL/L (ref 135–145)
SODIUM UR-SCNC: 53 MMOL/L
SOURCE SOURCE: ABNORMAL
WBC # BLD AUTO: 9.6 K/UL (ref 4.8–10.8)

## 2021-08-01 PROCEDURE — 700102 HCHG RX REV CODE 250 W/ 637 OVERRIDE(OP): Performed by: INTERNAL MEDICINE

## 2021-08-01 PROCEDURE — 99233 SBSQ HOSP IP/OBS HIGH 50: CPT | Performed by: INTERNAL MEDICINE

## 2021-08-01 PROCEDURE — 770020 HCHG ROOM/CARE - TELE (206)

## 2021-08-01 PROCEDURE — 84100 ASSAY OF PHOSPHORUS: CPT

## 2021-08-01 PROCEDURE — 85025 COMPLETE CBC W/AUTO DIFF WBC: CPT

## 2021-08-01 PROCEDURE — 80076 HEPATIC FUNCTION PANEL: CPT

## 2021-08-01 PROCEDURE — 83735 ASSAY OF MAGNESIUM: CPT

## 2021-08-01 PROCEDURE — 700111 HCHG RX REV CODE 636 W/ 250 OVERRIDE (IP): Performed by: INTERNAL MEDICINE

## 2021-08-01 PROCEDURE — 84300 ASSAY OF URINE SODIUM: CPT

## 2021-08-01 PROCEDURE — 82533 TOTAL CORTISOL: CPT

## 2021-08-01 PROCEDURE — 87338 HPYLORI STOOL AG IA: CPT

## 2021-08-01 PROCEDURE — 700105 HCHG RX REV CODE 258: Performed by: INTERNAL MEDICINE

## 2021-08-01 PROCEDURE — A9270 NON-COVERED ITEM OR SERVICE: HCPCS | Performed by: INTERNAL MEDICINE

## 2021-08-01 PROCEDURE — 700101 HCHG RX REV CODE 250: Performed by: INTERNAL MEDICINE

## 2021-08-01 PROCEDURE — 97162 PT EVAL MOD COMPLEX 30 MIN: CPT

## 2021-08-01 PROCEDURE — 83935 ASSAY OF URINE OSMOLALITY: CPT

## 2021-08-01 PROCEDURE — 94760 N-INVAS EAR/PLS OXIMETRY 1: CPT

## 2021-08-01 PROCEDURE — 84295 ASSAY OF SERUM SODIUM: CPT

## 2021-08-01 PROCEDURE — 93306 TTE W/DOPPLER COMPLETE: CPT

## 2021-08-01 PROCEDURE — 93306 TTE W/DOPPLER COMPLETE: CPT | Mod: 26 | Performed by: INTERNAL MEDICINE

## 2021-08-01 PROCEDURE — 80048 BASIC METABOLIC PNL TOTAL CA: CPT

## 2021-08-01 PROCEDURE — C9113 INJ PANTOPRAZOLE SODIUM, VIA: HCPCS | Performed by: INTERNAL MEDICINE

## 2021-08-01 PROCEDURE — 83930 ASSAY OF BLOOD OSMOLALITY: CPT

## 2021-08-01 RX ORDER — AMOXICILLIN 250 MG
2 CAPSULE ORAL 2 TIMES DAILY
Status: DISCONTINUED | OUTPATIENT
Start: 2021-08-01 | End: 2021-08-06 | Stop reason: HOSPADM

## 2021-08-01 RX ORDER — POLYETHYLENE GLYCOL 3350 17 G/17G
1 POWDER, FOR SOLUTION ORAL 2 TIMES DAILY
Status: DISCONTINUED | OUTPATIENT
Start: 2021-08-01 | End: 2021-08-06 | Stop reason: HOSPADM

## 2021-08-01 RX ORDER — BISACODYL 10 MG
10 SUPPOSITORY, RECTAL RECTAL
Status: DISCONTINUED | OUTPATIENT
Start: 2021-08-01 | End: 2021-08-06 | Stop reason: HOSPADM

## 2021-08-01 RX ORDER — POTASSIUM CHLORIDE 20 MEQ/1
40 TABLET, EXTENDED RELEASE ORAL 2 TIMES DAILY
Status: COMPLETED | OUTPATIENT
Start: 2021-08-01 | End: 2021-08-01

## 2021-08-01 RX ORDER — SODIUM CHLORIDE 1 G/1
1 TABLET ORAL
Status: DISCONTINUED | OUTPATIENT
Start: 2021-08-01 | End: 2021-08-06 | Stop reason: HOSPADM

## 2021-08-01 RX ORDER — PANTOPRAZOLE SODIUM 40 MG/10ML
40 INJECTION, POWDER, LYOPHILIZED, FOR SOLUTION INTRAVENOUS DAILY
Status: DISCONTINUED | OUTPATIENT
Start: 2021-08-01 | End: 2021-08-03

## 2021-08-01 RX ADMIN — ACETAMINOPHEN 650 MG: 325 TABLET, FILM COATED ORAL at 05:19

## 2021-08-01 RX ADMIN — PANTOPRAZOLE SODIUM 40 MG: 40 INJECTION, POWDER, LYOPHILIZED, FOR SOLUTION INTRAVENOUS at 09:26

## 2021-08-01 RX ADMIN — DOCUSATE SODIUM 50 MG AND SENNOSIDES 8.6 MG 2 TABLET: 8.6; 5 TABLET, FILM COATED ORAL at 17:06

## 2021-08-01 RX ADMIN — SODIUM PHOSPHATE, MONOBASIC, MONOHYDRATE 30 MMOL: 276; 142 INJECTION, SOLUTION INTRAVENOUS at 09:17

## 2021-08-01 RX ADMIN — LIDOCAINE HYDROCHLORIDE 30 ML: 20 SOLUTION OROPHARYNGEAL at 09:25

## 2021-08-01 RX ADMIN — POLYETHYLENE GLYCOL 3350 1 PACKET: 17 POWDER, FOR SOLUTION ORAL at 09:25

## 2021-08-01 RX ADMIN — SODIUM CHLORIDE 1 G: 1 TABLET ORAL at 17:06

## 2021-08-01 RX ADMIN — DOCUSATE SODIUM 50 MG AND SENNOSIDES 8.6 MG 2 TABLET: 8.6; 5 TABLET, FILM COATED ORAL at 05:18

## 2021-08-01 RX ADMIN — CEFTRIAXONE SODIUM 1 G: 1 INJECTION, POWDER, FOR SOLUTION INTRAMUSCULAR; INTRAVENOUS at 05:19

## 2021-08-01 RX ADMIN — ONDANSETRON 4 MG: 2 INJECTION INTRAMUSCULAR; INTRAVENOUS at 17:06

## 2021-08-01 RX ADMIN — ATORVASTATIN CALCIUM 40 MG: 40 TABLET, FILM COATED ORAL at 17:06

## 2021-08-01 RX ADMIN — POTASSIUM CHLORIDE 40 MEQ: 1500 TABLET, EXTENDED RELEASE ORAL at 09:25

## 2021-08-01 RX ADMIN — POTASSIUM CHLORIDE: 2 INJECTION, SOLUTION, CONCENTRATE INTRAVENOUS at 09:17

## 2021-08-01 RX ADMIN — POTASSIUM CHLORIDE 40 MEQ: 1500 TABLET, EXTENDED RELEASE ORAL at 17:06

## 2021-08-01 RX ADMIN — SODIUM CHLORIDE: 9 INJECTION, SOLUTION INTRAVENOUS at 01:03

## 2021-08-01 RX ADMIN — ENOXAPARIN SODIUM 40 MG: 40 INJECTION SUBCUTANEOUS at 05:18

## 2021-08-01 RX ADMIN — SODIUM CHLORIDE 1 G: 1 TABLET ORAL at 12:10

## 2021-08-01 RX ADMIN — ONDANSETRON 4 MG: 2 INJECTION INTRAMUSCULAR; INTRAVENOUS at 09:25

## 2021-08-01 RX ADMIN — SODIUM CHLORIDE 1 G: 1 TABLET ORAL at 09:25

## 2021-08-01 ASSESSMENT — ENCOUNTER SYMPTOMS
DIZZINESS: 0
FALLS: 0
VOMITING: 0
SHORTNESS OF BREATH: 0
BLURRED VISION: 0
CONSTIPATION: 1
ABDOMINAL PAIN: 0
FEVER: 1
SORE THROAT: 0
CHILLS: 1
DIARRHEA: 0
COUGH: 0
NAUSEA: 1
HEADACHES: 1

## 2021-08-01 ASSESSMENT — PAIN DESCRIPTION - PAIN TYPE: TYPE: ACUTE PAIN

## 2021-08-01 ASSESSMENT — COGNITIVE AND FUNCTIONAL STATUS - GENERAL
WALKING IN HOSPITAL ROOM: A LITTLE
SUGGESTED CMS G CODE MODIFIER MOBILITY: CJ
STANDING UP FROM CHAIR USING ARMS: A LITTLE
CLIMB 3 TO 5 STEPS WITH RAILING: A LITTLE
MOBILITY SCORE: 20
MOVING FROM LYING ON BACK TO SITTING ON SIDE OF FLAT BED: A LITTLE

## 2021-08-01 ASSESSMENT — GAIT ASSESSMENTS
GAIT LEVEL OF ASSIST: SUPERVISED
DISTANCE (FEET): 50
DEVIATION: STEP TO

## 2021-08-01 ASSESSMENT — FIBROSIS 4 INDEX: FIB4 SCORE: 1.6

## 2021-08-01 NOTE — PROGRESS NOTES
"[facility] Medicine Daily Progress Note    Date of Service  8/1/2021    Chief Complaint  Karla Prince is a 75 y.o. female admitted 7/30/2021 with vertigo, headache, nausea/vomiting    Hospital Course  Karla Prince is a 75 y.o. female with history of osteoporosis, dyslipidemia, tobacco use who presented 7/30/2021 with vertigo, nausea/vomiting.     Patient was in her usual state of health until wednesday 7/28 in the am, got out of bed and developed acute dizziness and n/v.  She has since been having dry heaves, \"bile\" for the last few days.  Patient also reports she has developed new headache last 4 days (not normally headache person). \"skin hurts\", and reports chills but no fever.  Has had vertigo in the past if she's had the \"flu\" or \"cant eat\".  Patient also reports intermittent abdominal pain, LUQ, none now.  Her  reports he has noticed a gradual loss of appetite, lost her taste, after she had her second covid vaccine, tongue was numb, now better, can taste.  He also reports she has been sleeping a lot, all day long which is very abnormal for patient.  Does report she's had burning during urination. Smokes 4 cigarettes.       In the ED she was found to be afebrile, normal blood pressure and heart rate.  Labs remarkable for sodium 128.  Chest x-ray negative.'s abdominal x-ray negative.  CT head negative for acute intracranial findings does have white matter lucency most consistent with chronic small vessel ischemic change.    MRI brain negative for stroke.  She spiked a fever overnight on day of admission to 102F.  Blood cultures obtained.  Urine grew GBS.  She was started on CTX.      She had persistent nausea/vomiting so CT AP obtained which showed very distended bladder and constipation.  Frankel was placed and bowel regimen increased.      Her sodium level decreased despite IVF.       Interval Problem Update  - Yesterday CT AP showed distended bladder and constipation, post void residual ~500cc x 2 so frankel " placed with significant relief, UOP 3.5L  - suppository given which produced a few small hard stools, increasing bowel regimen today  - Temp overnight 100.2F, started on CTX yesterday, ucx growing GBS, however procal, CRP and WBC normal (however patient symptomatic, dysuria and urinary retention and positive cx)  - Still not eating much, still having nausea, no vomiting but no appetite, started on supplements, seen by dietician, will see H. Pylori stool antigen and then start PPI, will also trial GI cocktail  - Na decreasing despite -->123, checking labs, if consistent with SIADH will start salt tabs, K 3.2 s/p supplementation, Phos 2.1  - still having headache behind R eye, gave dose of toradol yesterday but doesn't remember if it helped, did sleep all night, vertigo improved  - BNP elevated yesterday, getting echo today  - seen by OT who did not think she needed any dc needs    I have personally seen and examined the patient at bedside. I discussed the plan of care with patient and family.    Consultants/Specialty  n/a    Code Status  DNAR/DNI    Disposition  Patient is not medically cleared.   Anticipate discharge to to home with close outpatient follow-up.  I have placed the appropriate orders for post-discharge needs.    Review of Systems  Review of Systems   Constitutional: Positive for chills, fever and malaise/fatigue.   HENT: Negative for congestion and sore throat.    Eyes: Negative for blurred vision.   Respiratory: Negative for cough and shortness of breath.    Cardiovascular: Negative for chest pain and leg swelling.   Gastrointestinal: Positive for constipation and nausea. Negative for abdominal pain, diarrhea and vomiting.   Genitourinary: Positive for dysuria.        Difficulty urinating, now with frankel   Musculoskeletal: Negative for falls.   Skin: Negative for rash.   Neurological: Positive for headaches. Negative for dizziness.        Physical Exam  Temp:  [36.5 °C (97.7 °F)-37.9 °C (100.2  °F)] 37.4 °C (99.4 °F)  Pulse:  [67-80] 72  Resp:  [16-18] 18  BP: (129-171)/(54-85) 153/66  SpO2:  [91 %-100 %] 93 %    Physical Exam  Vitals and nursing note reviewed.   Constitutional:       General: She is not in acute distress.     Appearance: She is ill-appearing. She is not toxic-appearing or diaphoretic.   HENT:      Head: Normocephalic and atraumatic.      Nose: Nose normal.      Mouth/Throat:      Mouth: Mucous membranes are moist.   Eyes:      General: No scleral icterus.     Conjunctiva/sclera: Conjunctivae normal.   Cardiovascular:      Rate and Rhythm: Normal rate and regular rhythm.      Heart sounds: No murmur heard.   No friction rub. No gallop.    Pulmonary:      Effort: Pulmonary effort is normal.      Breath sounds: Normal breath sounds.   Abdominal:      General: Abdomen is flat. Bowel sounds are normal. There is no distension.      Palpations: Abdomen is soft.      Tenderness: There is no abdominal tenderness.   Genitourinary:     Comments: Lantigua in place, yellow urine in bag  Musculoskeletal:      Cervical back: Normal range of motion.      Right lower leg: No edema.      Left lower leg: No edema.   Skin:     General: Skin is warm.      Coloration: Skin is not jaundiced.      Findings: No rash.   Neurological:      Mental Status: She is alert and oriented to person, place, and time.   Psychiatric:         Mood and Affect: Mood normal.         Behavior: Behavior normal.         Fluids    Intake/Output Summary (Last 24 hours) at 8/1/2021 0736  Last data filed at 8/1/2021 0700  Gross per 24 hour   Intake 4254.17 ml   Output 3575 ml   Net 679.17 ml       Laboratory  Recent Labs     07/30/21  0931 07/31/21  0750 08/01/21  0218   WBC 10.7 8.7 9.6   RBC 4.36 4.11* 4.38   HEMOGLOBIN 14.5 13.6 14.6   HEMATOCRIT 41.2 38.9 40.7   MCV 94.5 94.6 92.9   MCH 33.3* 33.1* 33.3*   MCHC 35.2* 35.0 35.9*   RDW 39.7 39.3 38.5   PLATELETCT 213 216 183   MPV 10.3 10.8 11.3     Recent Labs     07/30/21  0931  07/31/21  0144 08/01/21  0218   SODIUM 128* 128* 123*   POTASSIUM 3.9 4.0 3.2*   CHLORIDE 93* 90* 90*   CO2 24 21 19*   GLUCOSE 122* 99 92   BUN 13 13 9   CREATININE 0.61 0.55 0.44*   CALCIUM 9.1 9.5 8.5             Recent Labs     07/31/21  0144   TRIGLYCERIDE 234*   HDL 55   *       Imaging  CT-ABDOMEN-PELVIS WITH   Final Result      1.  Marked distention of the urinary bladder. No bladder wall thickening.      2.  No hydronephrosis.      3.  Minimal intrahepatic and extra hepatic biliary dilatation. No cholelithiasis.      4.  Increased volume of stool throughout the colon which may represent constipation.      MR-BRAIN-W/O   Final Result      1.  No acute abnormality.   2.  Mild cerebral volume loss.   3.  Mild chronic microvascular ischemic disease.      DX-CHEST-PORTABLE (1 VIEW)   Final Result         1. No acute cardiopulmonary abnormalities are identified.      [id number] VIEW   Final Result         No specific finding to suggest small bowel obstruction.      CT-HEAD W/O   Final Result         1. No acute intracranial findings. No evidence of acute intracranial hemorrhage or mass lesion.      2. White matter lucencies most consistent with chronic small vessel ischemic change.               EC-ECHOCARDIOGRAM COMPLETE W/O CONT    (Results Pending)        Assessment/Plan  * Intractable vomiting with nausea  Assessment & Plan  Vomiting improved but still with nausea and poor po intake  Unsure etiology, could be related to possible UTI vs constipation vs gastritis/PUD?  MRI brain negative (noncon), CT AP showed urinary retention and constipation  Nausea meds  H. Pylori, start PPI and trial of GI cocktail  IVF  Dietician consult  If no improvement in next 1-2 days will likely need cortrak placed for enteral nutrition as it's been ~5-6 days of very poor po intake    Acute cystitis without hematuria  Assessment & Plan  Patient spiked fever overnight, will check labs, get bcx and start empiric CTX (source  likely urine since did have some dysuria a few days ago, now improved though and UA not that impressive and had a lot of epithelial cells).  Did not have any elevated WBC yesterday.  No other SIRS criteria yesterday or overnight except temp.  Inflammatory markers and procal negative.   - Ucx growing GBS  - bcx NGTD  - IVF  - CTX 7/31-**    Hyponatremia  Assessment & Plan  Thought likely related to decreased PO intake however worsening with IVF, SIADH? Will check serum osm, urine osm, urine Na and then start salt tabs, fluid restrict but give a little IVF since not taking much at by mouth still  IVF  CTM    Vertigo  Assessment & Plan  Improving  Patient presents with acute vertigo, n/v, headache and severe fatigue however 4 days ago, nystagmus but otherwise normal neuro exam, no TPA, no permissive HTN  Diff dx: stroke vs BPPV --> dehdyration vs UTI -->dehdyration  CT head negative  MRI brain negative for acute findings  PT/OT  Lipid panel elevated            Urinary retention  Assessment & Plan  Patient developed urinary retention, UTI +/- meclizine?  Lantigua placed 8/1  CTX for UTI    Dyslipidemia- (present on admission)  Assessment & Plan  Elevated cholesterol  Started statin       VTE prophylaxis: enoxaparin ppx    I have performed a physical exam and reviewed and updated ROS and Plan today (8/1/2021). In review of yesterday's note (7/31/2021), there are no changes except as documented above.

## 2021-08-01 NOTE — THERAPY
Physical Therapy   Initial Evaluation     Patient Name: Karla Prince  Age:  75 y.o., Sex:  female  Medical Record #: [id number]  Today's Date: 8/1/2021     Precautions: (P) Fall Risk    Assessment  Patient is 75 y.o. female with a diagnosis of Vertigo N/V weakness.Pt lives at home with her  and is mod active.Acute PT needed to improve transfers and ambulation     Plan    Recommend Physical Therapy 4 times per week until therapy goals are met for the following treatments:  Gait Training, Neuro Re-Education / Balance, Therapeutic Activities and Therapeutic Exercises    08/01/21 1135   Total Time Spent   Total Time Spent (Mins) 30   Charge Group   PT Evaluation PT Evaluation Mod   Initial Contact Note    Initial Contact Note Order Received and Verified, Physical Therapy Evaluation in Progress with Full Report to Follow.   Precautions   Precautions Fall Risk   Vitals   Pulse 65   Patient BP Position Supine   Blood Pressure  148/62   Respiration 18   Pulse Oximetry 98 %   O2 (LPM) 0   O2 Delivery Device None - Room Air   Pain 0 - 10 Group   Pain Rating Scale (NPRS) 2   Therapist Pain Assessment 2   Prior Living Situation   Prior Services None   Housing / Facility 1 Story House   Steps Into Home 1   Steps In Home 1   Equipment Owned Single Point Cane   Lives with - Patient's Self Care Capacity Spouse   Prior Level of Functional Mobility   Bed Mobility Independent   Transfer Status Independent   Ambulation Independent   Distance Ambulation (Feet)   (limited community)   Assistive Devices Used None   Cognition    Cognition / Consciousness WDL   Passive ROM Lower Body   Passive ROM Lower Body WDL   Active ROM Lower Body    Active ROM Lower Body  WDL   Strength Lower Body   Lower Body Strength  X   Comments general weakness   Balance Assessment   Sitting Balance (Static) Fair +   Sitting Balance (Dynamic) Fair +   Standing Balance (Static) Fair   Standing Balance (Dynamic) Fair   Weight Shift Sitting Fair   Weight Shift  Standing Fair   Gait Analysis   Gait Level Of Assist Supervised   Assistive Device   (iv pole)   Distance (Feet) 50   # of Times Distance was Traveled 1   Deviation Step To   # of Stairs Climbed 0   Weight Bearing Status full   Bed Mobility    Supine to Sit Supervised   Sit to Supine Supervised   Scooting Supervised   Functional Mobility   Sit to Stand Supervised   Bed, Chair, Wheelchair Transfer Supervised   How much difficulty does the patient currently have...   Turning over in bed (including adjusting bedclothes, sheets and blankets)? 4   Sitting down on and standing up from a chair with arms (e.g., wheelchair, bedside commode, etc.) 3   Moving from lying on back to sitting on the side of the bed? 4   How much help from another person does the patient currently need...   Moving to and from a bed to a chair (including a wheelchair)? 3   Need to walk in a hospital room? 3   Climbing 3-5 steps with a railing? 3   6 clicks Mobility Score 20   Activity Tolerance   Sitting Edge of Bed 10   Standing 7   Patient / Family Goals    Patient / Family Goal #1 Home   Short Term Goals    Short Term Goal # 1 Pt to be I with transfers in 4 V so can go home   Short Term Goal # 2 Pt to be I with ambulation x 200 feet in 4 V so can go home   Problem List    Problems Impaired Transfers;Impaired Ambulation;Functional Strength Deficit;Impaired Balance;Decreased Activity Tolerance   Anticipated Discharge Equipment and Recommendations   DC Equipment Recommendations Unable to determine at this time   Discharge Recommendations Anticipate that the patient will have no further physical therapy needs after discharge from the hospital   Interdisciplinary Plan of Care Collaboration   IDT Collaboration with  Nursing   Session Information   Date / Session Number  8/1-1 1/4 8/7       DC Equipment Recommendations: (P) Unable to determine at this time  Discharge Recommendations: (P) Anticipate that the patient will have no further physical therapy  needs after discharge from the hospital

## 2021-08-01 NOTE — PROGRESS NOTES
Telemetry Shift Summary     Rhythm SR  HR Range 69 - 85  Measurements 0.20 / 0.08 / 0.38           Normal Values  Rhythm SR  HR Range    Measurements 0.12-0.20 / 0.06-0.10  / 0.30-0.52

## 2021-08-01 NOTE — PROGRESS NOTES
Received bedside patient report from SAIRA Benavides. Patient resting comfortably in bed, no complaints at this time. Safety precautions in place. Will continue to monitor.

## 2021-08-01 NOTE — PROGRESS NOTES
Patient with decreased feelings of vertigo and headache this morning, alleviated by plentiful sleep last night. Mild nausea remains, small brown/bile emesis this morning, PRN zofran then given along with new medications and IV fluids. Patient able to tolerate oral medications without nausea, including miralax. I/O's to be monitored, 1500mL fluid restriction now in place. Lantigua draining clear yellow urine. Patient has not been able to produce a bowel movement today, appetite remains quite low but is agreeable to trial plain toast for breakfast.

## 2021-08-01 NOTE — PROGRESS NOTES
Fleet saline enema administered with good effect. Patient with moderate sized stool, mostly liquid in consistency, brown in color. Patient verbalizes relief following bowel movement. Sample then sent to lab.

## 2021-08-01 NOTE — PROGRESS NOTES
Patient up to bathroomwith nurse assist. Small bowel movement produced by patient. She continues to verbalize dysuria and urgency with urination, with little urinary void. Patient bladder scanned again, results of 518mL following voiding attempt. Lantigua to be placed per MD order. Daughter Nohelia updated regarding plan of care over the phone.

## 2021-08-01 NOTE — ASSESSMENT & PLAN NOTE
Patient developed urinary retention, UTI +/- meclizine?  Frankel placed 8/1  CTX for UTI    Will dc frankel and monitor for retention

## 2021-08-01 NOTE — CARE PLAN
The patient is Watcher - Medium risk of patient condition declining or worsening    Shift Goals  Clinical Goals: Improvement in electrolytes  Patient Goals: Increased oral intake  Family Goals: Receive update from MD & RN    Progress made toward(s) clinical / shift goals:  yes    Patient is not progressing towards the following goals: n/a    Problem: Pain - Standard  Goal: Alleviation of pain or a reduction in pain to the patient’s comfort goal  Outcome: Progressing     Problem: Fall Risk  Goal: Patient will remain free from falls  Outcome: Progressing

## 2021-08-01 NOTE — CARE PLAN
The patient is Stable - Low risk of patient condition declining or worsening    Shift Goals  Clinical Goals: Improved Blood Pressure, Free from Falls and Injury  Patient Goals: Rest and Sleep  Family Goals: Receive update from MD    Progress made toward(s) clinical / shift goals:    Problem: Knowledge Deficit - Standard  Goal: Patient and family/care givers will demonstrate understanding of plan of care, disease process/condition, diagnostic tests and medications  Outcome: Progressing  Note: Patient updated on the plan of care. Encouraged to voice feelings and to ask questions. Answered all questions and concerns. Agrees with the plan of care.      Problem: Fall Risk  Goal: Patient will remain free from falls  Outcome: Progressing  Note: Safety precautions in place. Bed alarm ON. Will continue to monitor patient.

## 2021-08-02 ENCOUNTER — APPOINTMENT (OUTPATIENT)
Dept: RADIOLOGY | Facility: MEDICAL CENTER | Age: 76
DRG: 690 | End: 2021-08-02
Attending: INTERNAL MEDICINE
Payer: MEDICARE

## 2021-08-02 PROBLEM — G44.52 NEW DAILY PERSISTENT HEADACHE: Status: ACTIVE | Noted: 2021-08-02

## 2021-08-02 PROBLEM — R50.9 FEVER: Status: ACTIVE | Noted: 2021-08-02

## 2021-08-02 LAB
ANION GAP SERPL CALC-SCNC: 11 MMOL/L (ref 7–16)
BASOPHILS # BLD AUTO: 0.5 % (ref 0–1.8)
BASOPHILS # BLD: 0.04 K/UL (ref 0–0.12)
BUN SERPL-MCNC: 8 MG/DL (ref 8–22)
CALCIUM SERPL-MCNC: 8 MG/DL (ref 8.4–10.2)
CHLORIDE SERPL-SCNC: 92 MMOL/L (ref 96–112)
CO2 SERPL-SCNC: 22 MMOL/L (ref 20–33)
CREAT SERPL-MCNC: 0.58 MG/DL (ref 0.5–1.4)
CRP SERPL HS-MCNC: 0.44 MG/DL (ref 0–0.75)
EKG IMPRESSION: NORMAL
EOSINOPHIL # BLD AUTO: 0.02 K/UL (ref 0–0.51)
EOSINOPHIL NFR BLD: 0.3 % (ref 0–6.9)
ERYTHROCYTE [DISTWIDTH] IN BLOOD BY AUTOMATED COUNT: 38 FL (ref 35.9–50)
GLUCOSE SERPL-MCNC: 126 MG/DL (ref 65–99)
HCT VFR BLD AUTO: 33.9 % (ref 37–47)
HGB BLD-MCNC: 12.4 G/DL (ref 12–16)
IMM GRANULOCYTES # BLD AUTO: 0.02 K/UL (ref 0–0.11)
IMM GRANULOCYTES NFR BLD AUTO: 0.3 % (ref 0–0.9)
LYMPHOCYTES # BLD AUTO: 1.22 K/UL (ref 1–4.8)
LYMPHOCYTES NFR BLD: 15.3 % (ref 22–41)
MAGNESIUM SERPL-MCNC: 1.7 MG/DL (ref 1.5–2.5)
MCH RBC QN AUTO: 33.4 PG (ref 27–33)
MCHC RBC AUTO-ENTMCNC: 36.6 G/DL (ref 33.6–35)
MCV RBC AUTO: 91.4 FL (ref 81.4–97.8)
MONOCYTES # BLD AUTO: 1.04 K/UL (ref 0–0.85)
MONOCYTES NFR BLD AUTO: 13 % (ref 0–13.4)
NEUTROPHILS # BLD AUTO: 5.66 K/UL (ref 2–7.15)
NEUTROPHILS NFR BLD: 70.6 % (ref 44–72)
NRBC # BLD AUTO: 0 K/UL
NRBC BLD-RTO: 0 /100 WBC
PHOSPHATE SERPL-MCNC: 1.8 MG/DL (ref 2.5–4.5)
PLATELET # BLD AUTO: 198 K/UL (ref 164–446)
PMV BLD AUTO: 10.5 FL (ref 9–12.9)
POTASSIUM SERPL-SCNC: 3.6 MMOL/L (ref 3.6–5.5)
PROCALCITONIN SERPL-MCNC: 0.14 NG/ML
RBC # BLD AUTO: 3.71 M/UL (ref 4.2–5.4)
SODIUM SERPL-SCNC: 125 MMOL/L (ref 135–145)
SODIUM SERPL-SCNC: 126 MMOL/L (ref 135–145)
TROPONIN T SERPL-MCNC: 8 NG/L (ref 6–19)
WBC # BLD AUTO: 8 K/UL (ref 4.8–10.8)

## 2021-08-02 PROCEDURE — 700101 HCHG RX REV CODE 250: Performed by: INTERNAL MEDICINE

## 2021-08-02 PROCEDURE — 84100 ASSAY OF PHOSPHORUS: CPT

## 2021-08-02 PROCEDURE — 93010 ELECTROCARDIOGRAM REPORT: CPT | Performed by: INTERNAL MEDICINE

## 2021-08-02 PROCEDURE — 93005 ELECTROCARDIOGRAM TRACING: CPT | Performed by: INTERNAL MEDICINE

## 2021-08-02 PROCEDURE — 97530 THERAPEUTIC ACTIVITIES: CPT

## 2021-08-02 PROCEDURE — 97116 GAIT TRAINING THERAPY: CPT

## 2021-08-02 PROCEDURE — 94760 N-INVAS EAR/PLS OXIMETRY 1: CPT

## 2021-08-02 PROCEDURE — 85025 COMPLETE CBC W/AUTO DIFF WBC: CPT

## 2021-08-02 PROCEDURE — 700111 HCHG RX REV CODE 636 W/ 250 OVERRIDE (IP): Performed by: INTERNAL MEDICINE

## 2021-08-02 PROCEDURE — 86140 C-REACTIVE PROTEIN: CPT

## 2021-08-02 PROCEDURE — 84295 ASSAY OF SERUM SODIUM: CPT

## 2021-08-02 PROCEDURE — C9113 INJ PANTOPRAZOLE SODIUM, VIA: HCPCS | Performed by: INTERNAL MEDICINE

## 2021-08-02 PROCEDURE — 99223 1ST HOSP IP/OBS HIGH 75: CPT | Performed by: INTERNAL MEDICINE

## 2021-08-02 PROCEDURE — 84145 PROCALCITONIN (PCT): CPT

## 2021-08-02 PROCEDURE — 770020 HCHG ROOM/CARE - TELE (206)

## 2021-08-02 PROCEDURE — A9270 NON-COVERED ITEM OR SERVICE: HCPCS | Performed by: INTERNAL MEDICINE

## 2021-08-02 PROCEDURE — 80048 BASIC METABOLIC PNL TOTAL CA: CPT

## 2021-08-02 PROCEDURE — 83735 ASSAY OF MAGNESIUM: CPT

## 2021-08-02 PROCEDURE — 700102 HCHG RX REV CODE 250 W/ 637 OVERRIDE(OP): Performed by: INTERNAL MEDICINE

## 2021-08-02 PROCEDURE — 700105 HCHG RX REV CODE 258: Performed by: INTERNAL MEDICINE

## 2021-08-02 PROCEDURE — 84484 ASSAY OF TROPONIN QUANT: CPT

## 2021-08-02 PROCEDURE — 99233 SBSQ HOSP IP/OBS HIGH 50: CPT | Performed by: INTERNAL MEDICINE

## 2021-08-02 RX ADMIN — SODIUM CHLORIDE 1 G: 1 TABLET ORAL at 08:50

## 2021-08-02 RX ADMIN — SODIUM CHLORIDE 1 G: 1 TABLET ORAL at 17:19

## 2021-08-02 RX ADMIN — ACETAMINOPHEN 650 MG: 325 TABLET, FILM COATED ORAL at 12:07

## 2021-08-02 RX ADMIN — ATORVASTATIN CALCIUM 40 MG: 40 TABLET, FILM COATED ORAL at 17:19

## 2021-08-02 RX ADMIN — POTASSIUM CHLORIDE: 2 INJECTION, SOLUTION, CONCENTRATE INTRAVENOUS at 06:06

## 2021-08-02 RX ADMIN — SODIUM CHLORIDE 1 G: 1 TABLET ORAL at 12:07

## 2021-08-02 RX ADMIN — CEFTRIAXONE SODIUM 1 G: 1 INJECTION, POWDER, FOR SOLUTION INTRAMUSCULAR; INTRAVENOUS at 05:50

## 2021-08-02 RX ADMIN — SODIUM PHOSPHATE, MONOBASIC, MONOHYDRATE 30 MMOL: 276; 142 INJECTION, SOLUTION INTRAVENOUS at 11:53

## 2021-08-02 RX ADMIN — DOCUSATE SODIUM 50 MG AND SENNOSIDES 8.6 MG 2 TABLET: 8.6; 5 TABLET, FILM COATED ORAL at 17:19

## 2021-08-02 RX ADMIN — PANTOPRAZOLE SODIUM 40 MG: 40 INJECTION, POWDER, LYOPHILIZED, FOR SOLUTION INTRAVENOUS at 05:56

## 2021-08-02 RX ADMIN — POTASSIUM CHLORIDE: 2 INJECTION, SOLUTION, CONCENTRATE INTRAVENOUS at 20:41

## 2021-08-02 RX ADMIN — POLYETHYLENE GLYCOL 3350 1 PACKET: 17 POWDER, FOR SOLUTION ORAL at 17:19

## 2021-08-02 RX ADMIN — OXYCODONE HYDROCHLORIDE 5 MG: 5 TABLET ORAL at 20:05

## 2021-08-02 RX ADMIN — POLYETHYLENE GLYCOL 3350 1 PACKET: 17 POWDER, FOR SOLUTION ORAL at 05:57

## 2021-08-02 RX ADMIN — ENOXAPARIN SODIUM 40 MG: 40 INJECTION SUBCUTANEOUS at 05:56

## 2021-08-02 RX ADMIN — DOCUSATE SODIUM 50 MG AND SENNOSIDES 8.6 MG 2 TABLET: 8.6; 5 TABLET, FILM COATED ORAL at 05:57

## 2021-08-02 ASSESSMENT — ENCOUNTER SYMPTOMS
CARDIOVASCULAR NEGATIVE: 1
DIZZINESS: 1
BRUISES/BLEEDS EASILY: 1
ABDOMINAL PAIN: 1
NECK PAIN: 1
EYES NEGATIVE: 1
VOMITING: 1
WEIGHT LOSS: 0
FALLS: 0
NAUSEA: 1
PHOTOPHOBIA: 0
MUSCULOSKELETAL NEGATIVE: 1
VOMITING: 0
CONSTIPATION: 1
SINUS PAIN: 0
SENSORY CHANGE: 0
PSYCHIATRIC NEGATIVE: 1
FEVER: 1
NERVOUS/ANXIOUS: 0
SHORTNESS OF BREATH: 0
DOUBLE VISION: 0
CONSTIPATION: 0
ABDOMINAL PAIN: 0
CHILLS: 1
FOCAL WEAKNESS: 0
BLURRED VISION: 0
CHILLS: 0
HEADACHES: 1
RESPIRATORY NEGATIVE: 1
DIARRHEA: 0
HEARTBURN: 1
SPUTUM PRODUCTION: 0
COUGH: 0
SORE THROAT: 0
DIZZINESS: 0

## 2021-08-02 ASSESSMENT — GAIT ASSESSMENTS
DISTANCE (FEET): 50
GAIT LEVEL OF ASSIST: MINIMAL ASSIST
DEVIATION: STEP TO;SHUFFLED GAIT
ASSISTIVE DEVICE: FRONT WHEEL WALKER

## 2021-08-02 ASSESSMENT — COGNITIVE AND FUNCTIONAL STATUS - GENERAL
MOVING FROM LYING ON BACK TO SITTING ON SIDE OF FLAT BED: A LOT
MOVING TO AND FROM BED TO CHAIR: A LOT
STANDING UP FROM CHAIR USING ARMS: A LITTLE
SUGGESTED CMS G CODE MODIFIER MOBILITY: CK
WALKING IN HOSPITAL ROOM: A LITTLE
CLIMB 3 TO 5 STEPS WITH RAILING: A LOT
TURNING FROM BACK TO SIDE WHILE IN FLAT BAD: A LITTLE
MOBILITY SCORE: 15

## 2021-08-02 ASSESSMENT — FIBROSIS 4 INDEX: FIB4 SCORE: 3.04

## 2021-08-02 ASSESSMENT — PAIN DESCRIPTION - PAIN TYPE
TYPE: ACUTE PAIN
TYPE: ACUTE PAIN

## 2021-08-02 NOTE — PROGRESS NOTES
Attending Hospitalist is Dr Erwin starting at 0700. Please contact this physician for orders, updates or questions today.

## 2021-08-02 NOTE — PROGRESS NOTES
Telemetry Shift Summary    Rhythm SR  HR Range 60s-70s  Ectopy rare PVC  Measurements 0.12/0.10/0.40        Normal Values  Rhythm SR  HR Range    Measurements 0.12-0.20 / 0.06-0.10  / 0.30-0.52

## 2021-08-02 NOTE — ASSESSMENT & PLAN NOTE
Fevers have returned, unknown etiology, malignancy? Encephalitis/meningitis? ucx growing GBS but been on CTX > 48 hours, bcx negative, echo negative, only symptoms are headache and nausea/vomiting, may need LP  - consulted ID, ok to monitor off abx since finished ceftriaxone   - afebrile 48 hrs

## 2021-08-02 NOTE — PROGRESS NOTES
"Hospital Medicine Daily Progress Note    Date of Service  8/2/2021    Chief Complaint  Karla Prince is a 75 y.o. female admitted 7/30/2021 with vertigo, headache, nausea/vomiting    Hospital Course  Karla Prince is a 75 y.o. female with history of osteoporosis, dyslipidemia, tobacco use who presented 7/30/2021 with vertigo, nausea/vomiting.     Patient was in her usual state of health until wednesday 7/28 in the am, got out of bed and developed acute dizziness and n/v.  She has since been having dry heaves, \"bile\" for the last few days.  Patient also reports she has developed new headache last 4 days (not normally headache person). \"skin hurts\", and reports chills but no fever.  Has had vertigo in the past if she's had the \"flu\" or \"cant eat\".  Patient also reports intermittent abdominal pain, LUQ, none now.  Her  reports he has noticed a gradual loss of appetite, lost her taste, after she had her second covid vaccine, tongue was numb, now better, can taste.  He also reports she has been sleeping a lot, all day long which is very abnormal for patient.  Does report she's had burning during urination. Smokes 4 cigarettes.       In the ED she was found to be afebrile, normal blood pressure and heart rate.  Labs remarkable for sodium 128.  Chest x-ray negative.'s abdominal x-ray negative.  CT head negative for acute intracranial findings does have white matter lucency most consistent with chronic small vessel ischemic change.    MRI brain negative for stroke.  She spiked a fever overnight on day of admission to 102F.  Blood cultures obtained.  Urine grew GBS.  She was started on CTX.      She had persistent nausea/vomiting so CT AP obtained which showed very distended bladder and constipation.  Lantigua was placed and bowel regimen increased.      Her sodium level decreased despite IVF. Salt tabs started 8/1.      On 8/2 she began having fevers again.      Interval Problem Update  - Overnight began spiking fevers " tmax 101F, WBC 8 (stable), will recheck CRP, procal, consulting ID today  - Gave enema yesterday which produced BM  - Emesis x 2 yesterday  - Na slightly improved with fluid restriction and salt tabs (still on IVF since not taking much at all), will have dietician come see patient, may need GI consult today for EGD as well as cortrak, given mildly dilated biliary ducts will get MRCP as well  - still c/o headache    I have personally seen and examined the patient at bedside. I discussed the plan of care with patient, family and infectious disease.    Consultants/Specialty  infectious disease     Code Status  DNAR/DNI    Disposition  Patient is not medically cleared.   Anticipate discharge to to home with close outpatient follow-up.  I have placed the appropriate orders for post-discharge needs.    Review of Systems  Review of Systems   Constitutional: Positive for chills, fever and malaise/fatigue.   HENT: Negative for congestion and sore throat.    Eyes: Negative for blurred vision.   Respiratory: Negative for cough and shortness of breath.    Cardiovascular: Negative for chest pain and leg swelling.   Gastrointestinal: Positive for constipation and nausea. Negative for abdominal pain, diarrhea and vomiting.   Genitourinary: Positive for dysuria.        Difficulty urinating, now with frankel   Musculoskeletal: Negative for falls.   Skin: Negative for rash.   Neurological: Positive for headaches. Negative for dizziness, sensory change and focal weakness.        Physical Exam  Temp:  [36.5 °C (97.7 °F)-38.3 °C (101 °F)] 37.3 °C (99.1 °F)  Pulse:  [65-78] 65  Resp:  [18] 18  BP: (138-166)/(60-74) 147/60  SpO2:  [91 %-98 %] 91 %    Physical Exam  Vitals and nursing note reviewed.   Constitutional:       General: She is not in acute distress.     Appearance: She is ill-appearing. She is not toxic-appearing or diaphoretic.   HENT:      Head: Normocephalic and atraumatic.      Nose: Nose normal.      Mouth/Throat:       Mouth: Mucous membranes are moist.   Eyes:      General: No scleral icterus.     Conjunctiva/sclera: Conjunctivae normal.   Cardiovascular:      Rate and Rhythm: Normal rate and regular rhythm.      Heart sounds: No murmur heard.   No friction rub. No gallop.    Pulmonary:      Effort: Pulmonary effort is normal.      Breath sounds: Normal breath sounds.   Abdominal:      General: Abdomen is flat. Bowel sounds are normal. There is no distension.      Palpations: Abdomen is soft.      Tenderness: There is no abdominal tenderness.   Genitourinary:     Comments: Lantigua in place, yellow urine in bag  Musculoskeletal:      Cervical back: Normal range of motion.      Right lower leg: No edema.      Left lower leg: No edema.   Skin:     General: Skin is warm.      Coloration: Skin is not jaundiced.      Findings: No rash.   Neurological:      Mental Status: She is alert and oriented to person, place, and time.   Psychiatric:         Mood and Affect: Mood normal.         Behavior: Behavior normal.         Fluids    Intake/Output Summary (Last 24 hours) at 8/2/2021 0747  Last data filed at 8/1/2021 2300  Gross per 24 hour   Intake 1620.22 ml   Output 1350 ml   Net 270.22 ml       Laboratory  Recent Labs     07/31/21  0750 08/01/21  0218 08/02/21  0023   WBC 8.7 9.6 8.0   RBC 4.11* 4.38 3.71*   HEMOGLOBIN 13.6 14.6 12.4   HEMATOCRIT 38.9 40.7 33.9*   MCV 94.6 92.9 91.4   MCH 33.1* 33.3* 33.4*   MCHC 35.0 35.9* 36.6*   RDW 39.3 38.5 38.0   PLATELETCT 216 183 198   MPV 10.8 11.3 10.5     Recent Labs     07/31/21  0144 07/31/21  0144 08/01/21  0218 08/01/21  0218 08/01/21  1015 08/01/21  1705 08/02/21  0023   SODIUM 128*   < > 123*   < > 124* 123* 125*   POTASSIUM 4.0  --  3.2*  --   --   --  3.6   CHLORIDE 90*  --  90*  --   --   --  92*   CO2 21  --  19*  --   --   --  22   GLUCOSE 99  --  92  --   --   --  126*   BUN 13  --  9  --   --   --  8   CREATININE 0.55  --  0.44*  --   --   --  0.58   CALCIUM 9.5  --  8.5  --   --    --  8.0*    < > = values in this interval not displayed.             Recent Labs     07/31/21  0144   TRIGLYCERIDE 234*   HDL 55   *       Imaging  EC-ECHOCARDIOGRAM COMPLETE W/O CONT   Final Result      CT-ABDOMEN-PELVIS WITH   Final Result      1.  Marked distention of the urinary bladder. No bladder wall thickening.      2.  No hydronephrosis.      3.  Minimal intrahepatic and extra hepatic biliary dilatation. No cholelithiasis.      4.  Increased volume of stool throughout the colon which may represent constipation.      MR-BRAIN-W/O   Final Result      1.  No acute abnormality.   2.  Mild cerebral volume loss.   3.  Mild chronic microvascular ischemic disease.      DX-CHEST-PORTABLE (1 VIEW)   Final Result         1. No acute cardiopulmonary abnormalities are identified.      [id number] VIEW   Final Result         No specific finding to suggest small bowel obstruction.      CT-HEAD W/O   Final Result         1. No acute intracranial findings. No evidence of acute intracranial hemorrhage or mass lesion.      2. White matter lucencies most consistent with chronic small vessel ischemic change.               [id number]-X/O    (Results Pending)        Assessment/Plan  * Intractable vomiting with nausea  Assessment & Plan  Vomiting improved but still with nausea and poor po intake  Unsure etiology, could be related to possible UTI vs constipation vs gastritis/PUD vs malignancy? Getting MRCP today although nothing on CT AP  MRI brain negative (noncon), CT AP showed urinary retention and constipation  Nausea meds  H. Pylori, started PPI   IVF  Dietician consult  If no improvement in next 1-2 days will likely need cortrak placed for enteral nutrition as it's been ~5-6 days of very poor po intake  GI consult today?    Acute cystitis without hematuria  Assessment & Plan  Patient spiked fever overnight, will check labs, get bcx and start empiric CTX (source likely urine since did have some dysuria a few days ago,  now improved though and UA not that impressive and had a lot of epithelial cells).  Did not have any elevated WBC yesterday.  No other SIRS criteria yesterday or overnight except temp.  Inflammatory markers and procal negative.   - Ucx growing GBS  - bcx NGTD  - IVF  - CTX 7/31-**    Hyponatremia  Assessment & Plan  Thought likely related to decreased PO intake however worsening with IVF, SIADH? Labs mixed picture: high urine sodium but low urine osm, low serum osm, getting better with salt tabs and fluid restriction  IVF decreased, salt tabs 8/1  CTM    Vertigo  Assessment & Plan  Improving  Patient presents with acute vertigo, n/v, headache and severe fatigue however 4 days ago, nystagmus but otherwise normal neuro exam, no TPA, no permissive HTN  Diff dx: stroke vs BPPV --> dehdyration vs UTI -->dehdyration  CT head negative  MRI brain negative for acute findings  PT/OT  Lipid panel elevated            Fever  Assessment & Plan  Fevers have returned, unknown etiology, malignancy? Encephalitis/meningitis? ucx growing GBS but been on CTX > 48 hours, bcx negative, echo negative, only symptoms are headache and nausea/vomiting, may need LP  - consulting ID today  - rechecking inflammatory markers and procal  - On CTX, broaden?  - COVID/Flu/RSV negative    New daily persistent headache  Assessment & Plan  Unsure etiology, CT head negative, MRI brain without negative for acute findings  No other neuro sx's currently  Patient is having fevers again, may need LP, will talk with ID  Pain mgmt    Urinary retention  Assessment & Plan  Patient developed urinary retention, UTI +/- meclizine?  Lantigua placed 8/1  CTX for UTI    Dyslipidemia- (present on admission)  Assessment & Plan  Elevated cholesterol  Started statin       VTE prophylaxis: enoxaparin ppx    I have performed a physical exam and reviewed and updated ROS and Plan today (8/2/2021). In review of yesterday's note (8/1/2021), there are no changes except as documented  above.

## 2021-08-02 NOTE — DIETARY
Nutrition Services: Update   Day 3 of admit.  Karla Prince is a 75 y.o. female with admitting DX of Vertigo [R42]    RD received text from nurse this morning requesting RD see pt to determine if pt should have tube feed initiated.     Pt is currently on regular diet with 1500 mL fluid restriction. She is receiving Boost Breeze and Chobani Smoothies TID with meals. PO intake continues to be poor at 0 to < 25% of meals and 0 to 25-50% of Boost Breeze. Pt said that she is not drinking Boost Breeze or Chobani Smoothies because they are too sweet. She said that she thinks her appetite has improved some. She has been eating mainly fruit and fruit juice. RD encouraged addition of protein to meals. Pt was very positive about adding cottage cheese to her meals. She thought she may be able to tolerate scrambled eggs and was on the fence about grilled cheese sandwiches. She has not eaten chicken for ~ 50 years because of a bad experience at [employer]. RD encouraged  to bring in foods that pt likes such as plain Chobani Greek yogurt.  Pt currently does not want placement of Cortrak because she is concerned about discomfort due to strong gag reflex. RD encouraged pt to eat at least 25-50% of her meals between lunch today through breakfast tomorrow. If she can show improvement in PO intake, initiation of tube feed will not be addressed. She understands that if her intake does not improve, initiation of tube feed would be beneficial. RD explained risk of complications and increased length of stay associated with inadequate nutrition. Per pt's , PO intake has been dwindling for months with dramatic decrease in intake noted since last Tuesday (x 6 days). MD is aware of current nutrition plan.      Concern also mentioned about optimization of intake of foods with electrolytes and foods to prevent constipation. Pt's PO intake at this point is limited.  RD reassured pt and  that MD is able to  supplement electrolytes if they are low and laxatives can be provided for constipation.      Pt's  asked RD about order for appetite stimulant. Based on pt's current cause for poor appetite (N&V), appetite stimulant may not be appropriate.  said that pt's initial poor appetite months ago was not related to N&V. RD passed on 's request for appetite stimulant to pt's MD.      Wt 8/2/21: 56.5 kg via bed scale - Bed scale weights have been stable since admit. This may be fluid related. Pt is +1.8 liters of fluids. She is currently receiving 1 liter of fluids. Weight loss may occur with equalization of I&Os.     GI: LBM on 8/1/21    Labs: 8/2/21: sodium=126 (L), phos=1.8 (L), mag=1.7, potassium=3.6    Meds: salt tabs, sodium phosphate, senna-docusate, Miralax    Malnutrition Risk: (from RD note dated 7/31/21) Pt with moderate acute illness malnutrition r/t decreased appetite, loss of taste, and N/V as evidenced by PO <50% of normal for >1 week and significant 5% wt loss x1 month.        Recommendations/Plan:  1. Discontinue Boost Breeze and Chobani smoothies.   2. Add cottage cheese to meals TID and scrambled eggs to breakfast.   3. If PO intake does not improve to >25-50% of meals, MD may want to consider initiation of tube feed.    4. Encourage intake of meals  5. Document intake of all meals as % taken in ADL's to provide interdisciplinary communication across all shifts.   6. Monitor weight.  7. Nutrition rep will continue to see patient for ongoing meal and snack preferences.    RD following

## 2021-08-02 NOTE — CARE PLAN
The patient is Watcher - Medium risk of patient condition declining or worsening    Shift Goals  Clinical Goals: Improve electrolytes and eat  Patient Goals: Increase oral intake and prevent nausea  Family Goals: Recieve updates of care    Progress made toward(s) clinical / shift goals:  Patient has eaten this shift without nausea present.     Patient is not progressing towards the following goals: Patient still needs electrolyte correction.    Problem: Psychosocial - Patient Condition  Goal: Patient's ability to verbalize feelings about condition will improve  Outcome: Progressing       Problem: Fall Risk  Goal: Patient will remain free from falls  Outcome: Progressing

## 2021-08-02 NOTE — PROGRESS NOTES
Report received from Michi CHÁVEZ. Plan of care discussed. Patient resting comfortably in bed. Safety precautions in place.

## 2021-08-02 NOTE — PROGRESS NOTES
Telemetry Shift Summary    Rhythm SR  HR Range 66-72  Ectopy rPVC &rPAC  Measurements 0.14/0.08/0.40        Normal Values  Rhythm SR  HR Range    Measurements 0.12-0.20 / 0.06-0.10  / 0.30-0.52

## 2021-08-02 NOTE — CONSULTS
Date of service: 8/2/2021    Attending Physician: Machelle Erwin M.D.    History of Present Illness: Karla Prince is a 75 y.o. female here for nausea and vomiting.    She had some dizzyness.    She rule out for stroke.    She had been taking occasional Tums and took aspirin until recently but stopped because of bruising of upper extremities.    She has a lack of appetite. Denies melena, or hematochezia.    Has mild diffuse abdominal discomfort. But also had distended bladder and Lantigua was placed.    CT also demonstrated stool and bowel regimen is in place.    Spiked a fever to 102.    She quit smoking one week ago.    SARS is negative and she was vaccinated for Covid 19.    There is minimal intra and extrahepatic dilation of the bile ducts on CT scan but LFTs are normal.    Review of Systems:    Review of Systems   Constitutional: Positive for fever and malaise/fatigue. Negative for chills and weight loss.   HENT: Negative.    Eyes: Negative.    Respiratory: Negative.    Cardiovascular: Negative.    Gastrointestinal: Positive for abdominal pain, heartburn, nausea and vomiting.   Genitourinary: Negative.    Musculoskeletal: Negative.    Neurological: Positive for dizziness.   Endo/Heme/Allergies: Bruises/bleeds easily.   Psychiatric/Behavioral: Negative.        Current Facility-Administered Medications   Medication Dose Route Frequency Provider Last Rate Last Admin   • sodium phosphate 30 mmol in dextrose 5% 500 mL ivpb  30 mmol Intravenous Once Machelle Erwin M.D. 83.3 mL/hr at 08/02/21 1153 30 mmol at 08/02/21 1153   • potassium chloride 20 mEq in LR 1,000 mL infusion   Intravenous Continuous Machelle Erwin M.D. 75 mL/hr at 08/02/21 1103 Restarted at 08/02/21 1103   • polyethylene glycol/lytes (MIRALAX) PACKET 1 Packet  1 Packet Oral BID Machelle Erwin M.D.   1 Packet at 08/02/21 0557    And   • senna-docusate (PERICOLACE or SENOKOT S) 8.6-50 MG per tablet 2 tablet  2 tablet Oral BID Machelle SHEFFIELD  ERNESTO Erwin   2 tablet at 08/02/21 0557    And   • magnesium hydroxide (MILK OF MAGNESIA) suspension 30 mL  30 mL Oral QDAY PRN Machelle Erwin M.D.        And   • bisacodyl (DULCOLAX) suppository 10 mg  10 mg Rectal QDAY PRN Machelle Erwin M.D.       • sodium chloride (SALT) tablet 1 g  1 g Oral TID WITH MEALS Machelle Erwin M.D.   1 g at 08/02/21 1207   • pantoprazole (PROTONIX) injection 40 mg  40 mg Intravenous DAILY Machelle Erwin M.D.   40 mg at 08/02/21 0556   • atorvastatin (LIPITOR) tablet 40 mg  40 mg Oral Q EVENING Machelle Erwin M.D.   40 mg at 08/01/21 1706   • LORazepam (ATIVAN) injection 0.5 mg  0.5 mg Intravenous Q4HRS PRN Machelle Erwin M.D.       • cefTRIAXone (ROCEPHIN) 1 g in  mL IVPB  1 g Intravenous Q24HRS Machelle Erwin M.D.   Stopped at 08/02/21 0620   • [Held by provider] enoxaparin (LOVENOX) inj 40 mg  40 mg Subcutaneous DAILY Machelle Erwin M.D.   40 mg at 08/02/21 0556   • acetaminophen (Tylenol) tablet 650 mg  650 mg Oral Q6HRS PRN Machelle Erwin M.D.   650 mg at 08/02/21 1207   • Pharmacy Consult Request ...Pain Management Review 1 Each  1 Each Other PHARMACY TO DOSE Machelle Erwin M.D.       • oxyCODONE immediate-release (ROXICODONE) tablet 2.5 mg  2.5 mg Oral Q3HRS PRN Machelle Erwin M.D.        Or   • oxyCODONE immediate-release (ROXICODONE) tablet 5 mg  5 mg Oral Q3HRS PRN Machelle Erwin M.D.        Or   • HYDROmorphone (Dilaudid) injection 0.25 mg  0.25 mg Intravenous Q3HRS PRN Machelle Erwin M.D.       • enalaprilat (VASOTEC) injection 1.25 mg  1.25 mg Intravenous Q6HRS PRN Machelle Erwni M.D.       • labetalol (NORMODYNE/TRANDATE) injection 10 mg  10 mg Intravenous Q4HRS PRN Machelle Erwin M.D.       • ondansetron (ZOFRAN) syringe/vial injection 4 mg  4 mg Intravenous Q4HRS PRN Machelle Erwin M.D.   4 mg at 08/01/21 1706   • ondansetron (ZOFRAN ODT) dispertab 4 mg  4 mg Oral Q4HRS PRN Machelle Erwin M.D.            Social History     Tobacco Use   • Smoking status: Current Every Day Smoker     Packs/day: 0.25     Years: 52.00     Pack years: 13.00     Types: Cigarettes   • Smokeless tobacco: Never Used   • Tobacco comment: dec from 1/2 ppd to 1/4 ppd since 2/16   Vaping Use   • Vaping Use: Never used   Substance Use Topics   • Alcohol use: Not Currently     Alcohol/week: 0.0 oz     Comment: glass of wine with dinner   • Drug use: No        Past Medical History:   Diagnosis Date   • Dyslipidemia    • Impaired fasting blood sugar    • Osteoporosis 3/16    DEXA scan-3/16   • Osteoporosis        Past Surgical History:   Procedure Laterality Date   • COLONOSCOPY  05/09/2018    int hemorrhoids   • HYSTERECTOMY, TOTAL ABDOMINAL      w/ BSO due to endometriosis       Allergies: Patient has no known allergies.    Family History   Problem Relation Age of Onset   • Dementia Mother    • Diabetes Father    • Hyperlipidemia Sister    • Hyperlipidemia Brother        Vitals:    08/02/21 1600   BP: 138/63   Pulse: (!) 58   Resp: 18   Temp:    SpO2: 95%        Physical Examination:     Physical Exam  Constitutional:       General: She is not in acute distress.     Appearance: Normal appearance. She is ill-appearing. She is not toxic-appearing or diaphoretic.   HENT:      Head: Normocephalic and atraumatic.   Eyes:      General: Scleral icterus present.   Cardiovascular:      Rate and Rhythm: Normal rate.   Pulmonary:      Effort: Pulmonary effort is normal. No respiratory distress.   Abdominal:      General: There is no distension.      Tenderness: There is no abdominal tenderness. There is no guarding or rebound.   Musculoskeletal:      Cervical back: Normal range of motion.   Skin:     General: Skin is warm and dry.   Neurological:      General: No focal deficit present.      Mental Status: She is alert and oriented to person, place, and time.   Psychiatric:         Mood and Affect: Mood normal.         Behavior: Behavior normal.          Thought Content: Thought content normal.           No results found for: PROTHROMBTM, INR   Lab Results   Component Value Date/Time    WBC 8.0 08/02/2021 12:23 AM    RBC 3.71 (L) 08/02/2021 12:23 AM    HEMOGLOBIN 12.4 08/02/2021 12:23 AM    HEMATOCRIT 33.9 (L) 08/02/2021 12:23 AM    MCV 91.4 08/02/2021 12:23 AM    MCH 33.4 (H) 08/02/2021 12:23 AM    MCHC 36.6 (H) 08/02/2021 12:23 AM    MPV 10.5 08/02/2021 12:23 AM    NEUTSPOLYS 70.60 08/02/2021 12:23 AM    LYMPHOCYTES 15.30 (L) 08/02/2021 12:23 AM    MONOCYTES 13.00 08/02/2021 12:23 AM    EOSINOPHILS 0.30 08/02/2021 12:23 AM    BASOPHILS 0.50 08/02/2021 12:23 AM      Lab Results   Component Value Date/Time    SODIUM 126 (L) 08/02/2021 09:18 AM    POTASSIUM 3.6 08/02/2021 12:23 AM    CHLORIDE 92 (L) 08/02/2021 12:23 AM    CO2 22 08/02/2021 12:23 AM    GLUCOSE 126 (H) 08/02/2021 12:23 AM    BUN 8 08/02/2021 12:23 AM    CREATININE 0.58 08/02/2021 12:23 AM      Recent Labs     08/01/21  0218   ASTSGOT 34   ALTSGPT 17   TBILIRUBIN 0.6   IBILIRUBIN see below   DBILIRUBIN <0.2   ALKPHOSPHAT 47       Imaging:   CT-ABDOMEN-PELVIS WITH    Result Date: 7/31/2021 7/31/2021 11:29 AM HISTORY/REASON FOR EXAM:  Abdominal pain. Urinary retention with painful urination. Fever. TECHNIQUE/EXAM DESCRIPTION:   CT scan of the abdomen and pelvis with contrast. Contrast-enhanced helical scanning was obtained from the diaphragmatic domes through the pubic symphysis following the bolus administration of nonionic contrast without complication. 100 mL of Omnipaque 350 nonionic contrast was administered without complication. Low dose optimization technique was utilized for this CT exam including automated exposure control and adjustment of the mA and/or kV according to patient size. COMPARISON: No prior studies available. FINDINGS: Lower Chest: The visualized lung bases demonstrate minimal linear fibrotic change or atelectasis. Liver: Normal. Spleen: Unremarkable. Pancreas: Unremarkable.  Gallbladder: No calcified stones. Biliary: There is minor intrahepatic biliary dilatation. There is mild dilatation of the common bile duct which measures 7 mm. No choledocholithiasis is identified. Adrenal glands: Normal. Kidneys: Unremarkable without hydronephrosis. Bowel: No obstruction or acute inflammation. There is an increased volume of stool throughout the colon. There is residual contrast in the distal colon The appendix appears normal. Lymph nodes: No adenopathy. Vasculature: Unremarkable. Peritoneum: Unremarkable without ascites. Musculoskeletal: No acute or destructive process. Pelvis: No adenopathy or free fluid. The bladder is markedly distended. No uterine or ovarian tissue is identified.     1.  Marked distention of the urinary bladder. No bladder wall thickening. 2.  No hydronephrosis. 3.  Minimal intrahepatic and extra hepatic biliary dilatation. No cholelithiasis. 4.  Increased volume of stool throughout the colon which may represent constipation.    CT-HEAD W/O    Result Date: 7/30/2021 7/30/2021 9:53 AM HISTORY/REASON FOR EXAM:  Neuro deficit, acute, stroke suspected These images. Nausea and vomiting. TECHNIQUE/EXAM DESCRIPTION AND NUMBER OF VIEWS: CT of the head without contrast. The study was performed on a helical multidetector CT scanner. Contiguous 2.5 mm axial sections were obtained from the skull base through the vertex. Up to date radiation dose reduction adjustments have been utilized to meet ALARA standards for radiation dose reduction. COMPARISON:  None available FINDINGS: There is no evidence of acute intracranial hemorrhage, mass, mass-effect or shift of midline structures. Gray-white matter differentiation is grossly preserved. There is hypoattenuation of the periventricular and subcortical white matter, in keeping with chronic microangiopathic ischemic changes. Ventricle size and brain parenchymal volume are appropriate for this patient's stated age. The basal cisterns are  patent. There are no abnormal extra-axial fluid collections. No depressed or widely  calvarial fracture is seen. The visualized paranasal sinuses and temporal bone structures are aerated. ___________________________________     1. No acute intracranial findings. No evidence of acute intracranial hemorrhage or mass lesion. 2. White matter lucencies most consistent with chronic small vessel ischemic change.     [id number] VIEW    Result Date: 7/30/2021 7/30/2021 10:13 AM HISTORY/REASON FOR EXAM:  Gastrointestinal Complaint dizziness TECHNIQUE/EXAM DESCRIPTION AND NUMBER OF VIEWS:  1 view(s) of the abdomen. COMPARISON: None FINDINGS: Moderate amount of stool throughout the colon. Nonspecific nonobstructive bowel gas pattern. No dilated gas-filled loop of small bowel. No portal venous gas or pneumatosis. No definite free intraperitoneal air but evaluation is limited on supine radiograph.     No specific finding to suggest small bowel obstruction.    DX-CHEST-PORTABLE (1 VIEW)    Result Date: 7/30/2021 7/30/2021 10:21 AM HISTORY/REASON FOR EXAM:  fatigue, loss of taste and smell Dizziness. Nausea. TECHNIQUE/EXAM DESCRIPTION AND NUMBER OF VIEWS: Single portable view of the chest. COMPARISON: 9/16/2019 FINDINGS: No pulmonary infiltrates or consolidations are noted. No pleural effusion. No pneumothorax. Stable cardiopericardial silhouette.     1. No acute cardiopulmonary abnormalities are identified.    MR-BRAIN-W/O    Result Date: 7/30/2021 7/30/2021 3:07 PM HISTORY/REASON FOR EXAM:  Dizziness, non-specific. TECHNIQUE/EXAM DESCRIPTION: MRI of the brain without contrast. T1 sagittal, T2 fast spin-echo axial, T1 coronal, FLAIR coronal, diffusion-weighted and apparent diffusion coefficient (ADC map) axial images were obtained of the whole brain. The study was performed on a [employer] 1.5 Smita MRI scanner. COMPARISON:  None. FINDINGS: There is no acute infarct. There is no acute or chronic parenchymal  hemorrhage. A few nonspecific T2 hyperintensities in the subcortical and periventricular white matter. Mild cerebral volume loss is seen. The pituitary, hypothalamic and the pineal regions are unremarkable. There are a few punctate nonspecific T2 hyperintensities in the subcortical white matter. There is no extra-axial fluid collection, hemorrhage or mass. There is no hippocampal volume loss or signal change. The ventricles, cortical sulci and the basal cisterns are prominent consistent with mild cerebral volume loss. The visualized flow voids of the cerebral vasculature are unremarkable.  There is no large lesion identified in the expected course of the intracranial portions of the cranial nerves. The skull bones are unremarkable. The paranasal sinuses are clear. The extracranial soft tissue including orbits appear grossly normal.     1.  No acute abnormality. 2.  Mild cerebral volume loss. 3.  Mild chronic microvascular ischemic disease.    EC-ECHOCARDIOGRAM COMPLETE W/O CONT    Result Date: 2021  Transthoracic Echo Report Echocardiography Laboratory CONCLUSIONS No prior study is available for comparison. Normal left ventricular systolic function. Left ventricular ejection fraction is visually estimated to be 65%. Grade I diastolic dysfunction. Normal right ventricular size and systolic function. No significant valve disease or flow abnormalities. ZAFAR SANDERS Exam Date:         2021                    08:57 Exam Location:     Inpatient Priority:          Routine Ordering Physician:        FLORECITA MCMANUS Referring Physician:       [id number] Haney Sonographer:               Meagan Maguire [facility] Age:    75     Gender:    F MRN:    [id number] :    1945 BSA:    1.57   Ht (in):    62     Wt (lb):    125 Exam Type:     Complete Indications:     Abnormal electrocardiogram ECG EKG ICD Codes:       R94.31 CPT Codes:       55014 BP:   129    /   61     HR:   60 Technical Quality:       Fair  MEASUREMENTS  (Male / Female) Normal Values 2D ECHO LV Diastolic Diameter PLAX        3.8 cm                4.2 - 5.9 / 3.9 - 5.3 cm LV Systolic Diameter PLAX         1.8 cm                2.1 - 4.0 cm IVS Diastolic Thickness           0.83 cm               LVPW Diastolic Thickness          0.67 cm               LVOT Diameter                     1.8 cm                Estimated LV Ejection Fraction    65 %                  LV Ejection Fraction MOD BP       64.9 %                >= 55  % LV Ejection Fraction MOD 4C       71.9 %                LV Ejection Fraction MOD 2C       56.7 %                IVC Diameter                      1.2 cm                DOPPLER AV Peak Velocity                  1.5 m/s               AV Peak Gradient                  8.5 mmHg              AV Mean Gradient                  4.7 mmHg              LVOT Peak Velocity                1.1 m/s               AV Area Cont Eq vti               2 cm2                 MV Velocity Time Integral         21.1 cm               Mitral E Point Velocity           0.72 m/s              Mitral E to A Ratio               0.74                  MV Pressure Half Time             49.5 ms               MV Area PHT                       4.4 cm2               MV Deceleration Time              171 ms                PV Peak Velocity                  1.1 m/s               PV Peak Gradient                  4.8 mmHg              RVOT Peak Velocity                0.51 m/s              * Indicates values subject to auto-interpretation LV EF:  65    % FINDINGS Left Ventricle Normal left ventricular chamber size. Normal left ventricular wall thickness. Normal left ventricular systolic function. Left ventricular ejection fraction is visually estimated to be 65%. Normal regional wall motion. Grade I diastolic dysfunction. Right Ventricle Normal right ventricular size and systolic function. Right Atrium Normal right atrial size. Normal inferior vena cava size and inspiratory  collapse. Left Atrium Mildly dilated left atrium. Left atrial volume index is 35 mL/sq m. Mitral Valve Mild mitral annular calcification. No mitral stenosis. No mitral regurgitation. Aortic Valve Structurally normal aortic valve. No aortic stenosis. Trace aortic insufficiency. Tricuspid Valve Structurally normal tricuspid valve. No tricuspid stenosis. Trace tricuspid regurgitation. Unable to estimate pulmonary artery pressure due to an inadequate tricuspid regurgitant jet. Pulmonic Valve The pulmonic valve is not well visualized. No stenosis or regurgitation seen. Pericardium No pericardial effusion seen. Aorta Normal aortic root for body surface area. Ascending aorta diameter is 3.1 cm. Bryson Barrios MD (Electronically Signed) Final Date:     01 August 2021                 12:57            Assessment and Plan:  Nausea and vomiting  Fever prior to admission  Takes Tums occasionally  On daily aspirin till 6 weeks ago  Mild ST elevation      PLAN:  EGD if cardiac clearance (negative troponins), nausea and vomiting can be a sign of cardiac disease and she has mild ST elevation.    Also sodium mus be trending up form 124

## 2021-08-02 NOTE — PROGRESS NOTES
Tele monitoring showed new slight ST elevation. MD notified. Patient had no complaints of chest pain or nausea/vomiting.  MD ordered repeating troponin levels and stat EKG. Stat EKG reviewed by MD. Troponin is 8. MD rounded at bedside, will continue to monitor on tele.

## 2021-08-02 NOTE — THERAPY
Physical Therapy   Daily Treatment     Patient Name: Karla Prince  Age:  75 y.o., Sex:  female  Medical Record #: [id number]  Today's Date: 8/2/2021     Precautions: Fall Risk    Assessment    Pts progress limited by fatigue and c/o HA type pain R side above ear and near eye. Pt shaky on feet, requiring Jhonny with FWW.     Plan    Continue current treatment plan.    DC Equipment Recommendations: Unable to determine at this time  Discharge Recommendations: Recommend home health for continued physical therapy services       08/02/21 1124   Cognition    Comments Pt agreeable for PT, reports feeling weak   Balance   Sitting Balance (Static) Fair   Sitting Balance (Dynamic) Fair -   Standing Balance (Static) Fair -   Standing Balance (Dynamic) Poor +   Weight Shift Sitting Fair   Weight Shift Standing Fair   Skilled Intervention Postural Facilitation;Sequencing;Tactile Cuing;Verbal Cuing   Comments stdg with FWW   Gait Analysis   Gait Level Of Assist Minimal Assist   Assistive Device Front Wheel Walker   Distance (Feet) 50   # of Times Distance was Traveled 1   Deviation Step To;Shuffled Gait   Bed Mobility    Supine to Sit Minimal Assist   Functional Mobility   Sit to Stand Minimal Assist   Bed, Chair, Wheelchair Transfer Minimal Assist   Transfer Method Stand Step   Activity Tolerance   Standing 3-4 min, c/o pain R side of head above ear   Short Term Goals    Short Term Goal # 1 Pt to be I with transfers in 4 V so can go home   Goal Outcome # 1 Progressing slower than expected   Short Term Goal # 2 Pt to be I with ambulation x 200 feet in 4 V so can go home   Goal Outcome # 2 Progressing slower than expected

## 2021-08-02 NOTE — CONSULTS
Consults   INFECTIOUS DISEASES INPATIENT CONSULT NOTE     Date of Service: 8/2/2021    Consult Requested By: Machelle Erwin M.D.    Reason for Consultation: Fever, nausea vomiting and UTI    History of Present Illness:   Karla Prince is a 75 y.o.  admitted 7/30/2021. Pt has a past medical history of dyslipidemia, tobacco abuse and presented complaining of vertigo nausea and vomiting.  She endorsed headache x4 days, vertigo, abdominal pain and some loss of appetite.  She also reported dysuria.      Hospital Course:   Patient febrile to 102.3 and continues to spike fevers.  Other vitals in normal limits and she is on room air.  No leukocytosis.  Procalcitonin unremarkable.  MRI brain without contrast unremarkable.  CT abdomen pelvis with contrast with distention of urinary bladder, no wall thickening, kidneys unremarkable, no hydronephrosis,  mild biliary ductal dilation.  Blood cultures were obtained and so far no growth.  Urine culture with growth of group B strep.  Today she continues to endorse headache described as pain behind her eyes, dizziness and some ongoing nausea.  GIs been consulted and plan is for potential EGD tomorrow.  MRI abdomen is pending.    Review Of Systems:  Review of Systems   Constitutional: Positive for fever and malaise/fatigue.   HENT: Negative for ear pain, hearing loss, sinus pain and sore throat.    Eyes: Negative for blurred vision, double vision and photophobia.   Respiratory: Negative for cough, sputum production and shortness of breath.    Cardiovascular: Negative for chest pain and leg swelling.   Gastrointestinal: Positive for nausea and vomiting. Negative for abdominal pain, constipation and diarrhea.   Genitourinary: Positive for dysuria.   Musculoskeletal: Positive for neck pain.   Skin: Negative for rash.   Neurological: Positive for dizziness and headaches.   Psychiatric/Behavioral: The patient is not nervous/anxious.          PMH:   Past Medical History:   Diagnosis Date    • Dyslipidemia    • Impaired fasting blood sugar    • Osteoporosis 3/16    DEXA scan-3/16   • Osteoporosis        PSH:  Past Surgical History:   Procedure Laterality Date   • COLONOSCOPY  05/09/2018    int hemorrhoids   • HYSTERECTOMY, TOTAL ABDOMINAL      w/ BSO due to endometriosis       FAMILY HX:  Family History   Problem Relation Age of Onset   • Dementia Mother    • Diabetes Father    • Hyperlipidemia Sister    • Hyperlipidemia Brother        SOCIAL HX:  Social History     Socioeconomic History   • Marital status:      Spouse name: Not on file   • Number of children: 1   • Years of education: Not on file   • Highest education level: Not on file   Occupational History   • Occupation: retired - front office neurosurgeon's office   Tobacco Use   • Smoking status: Current Every Day Smoker     Packs/day: 0.25     Years: 52.00     Pack years: 13.00     Types: Cigarettes   • Smokeless tobacco: Never Used   • Tobacco comment: dec from 1/2 ppd to 1/4 ppd since 2/16   Vaping Use   • Vaping Use: Never used   Substance and Sexual Activity   • Alcohol use: Not Currently     Alcohol/week: 0.0 oz     Comment: glass of wine with dinner   • Drug use: No   • Sexual activity: Yes     Partners: Male   Other Topics Concern   • Not on file   Social History Narrative   • Not on file     Social Determinants of Health     Financial Resource Strain:    • Difficulty of Paying Living Expenses:    Food Insecurity:    • Worried About Running Out of Food in the Last Year:    • Ran Out of Food in the Last Year:    Transportation Needs:    • Lack of Transportation (Medical):    • Lack of Transportation (Non-Medical):    Physical Activity:    • Days of Exercise per Week:    • Minutes of Exercise per Session:    Stress:    • Feeling of Stress :    Social Connections:    • Frequency of Communication with Friends and Family:    • Frequency of Social Gatherings with Friends and Family:    • Attends [employer] Services:    • Active Member  of Clubs or Organizations:    • Attends Club or Organization Meetings:    • Marital Status:    Intimate Partner Violence:    • Fear of Current or Ex-Partner:    • Emotionally Abused:    • Physically Abused:    • Sexually Abused:      Social History     Tobacco Use   Smoking Status Current Every Day Smoker   • Packs/day: 0.25   • Years: 52.00   • Pack years: 13.00   • Types: Cigarettes   Smokeless Tobacco Never Used   Tobacco Comment    dec from 1/2 ppd to 1/4 ppd since 2/16     Social History     Substance and Sexual Activity   Alcohol Use Not Currently   • Alcohol/week: 0.0 oz    Comment: glass of wine with dinner       Allergies/Intolerances:  No Known Allergies    History reviewed with the patient     Other Current Medications:    Current Facility-Administered Medications:   •  potassium chloride 20 mEq in LR 1,000 mL infusion, , Intravenous, Continuous, Machelle Erwin M.D., Last Rate: 75 mL/hr at 08/02/21 0640, Restarted at 08/02/21 0640  •  senna-docusate (PERICOLACE or SENOKOT S) 8.6-50 MG per tablet 2 tablet, 2 tablet, Oral, BID, 2 tablet at 08/02/21 0557 **AND** polyethylene glycol/lytes (MIRALAX) PACKET 1 Packet, 1 Packet, Oral, BID, 1 Packet at 08/02/21 0557 **AND** magnesium hydroxide (MILK OF MAGNESIA) suspension 30 mL, 30 mL, Oral, QDAY PRN **AND** bisacodyl (DULCOLAX) suppository 10 mg, 10 mg, Rectal, QDAY PRN, Machelle Erwin M.D.  •  sodium chloride (SALT) tablet 1 g, 1 g, Oral, TID WITH MEALS, Machelle Erwin M.D., 1 g at 08/01/21 1706  •  pantoprazole (PROTONIX) injection 40 mg, 40 mg, Intravenous, DAILY, Machelle Erwin M.D., 40 mg at 08/02/21 0556  •  atorvastatin (LIPITOR) tablet 40 mg, 40 mg, Oral, Q EVENING, Machelle Erwin M.D., 40 mg at 08/01/21 1706  •  LORazepam (ATIVAN) injection 0.5 mg, 0.5 mg, Intravenous, Q4HRS PRN, Machelle Erwin M.D.  •  cefTRIAXone (ROCEPHIN) 1 g in  mL IVPB, 1 g, Intravenous, Q24HRS, Machelle Erwin M.D., Stopped at 08/02/21 0620  •   "enoxaparin (LOVENOX) inj 40 mg, 40 mg, Subcutaneous, DAILY, Machelle Erwin M.D., 40 mg at 21 0556  •  acetaminophen (Tylenol) tablet 650 mg, 650 mg, Oral, Q6HRS PRN, Machelle Erwin M.D., 650 mg at 21 0519  •  Notify provider if pain remains uncontrolled, , , CONTINUOUS **AND** Use the Numeric Rating Scale (NRS), Hill-Baker Faces (WBF), or FLACC on regular floors and Critical-Care Pain Observation Tool (CPOT) on ICUs/Trauma to assess pain, , , CONTINUOUS **AND** Pulse Ox, , , CONTINUOUS **AND** Pharmacy Consult Request ...Pain Management Review 1 Each, 1 Each, Other, PHARMACY TO DOSE **AND** If patient difficult to arouse and/or has respiratory depression (respiratory rate of 10 or less), stop any opiates that are currently infusing and call a Rapid Response., , , CONTINUOUS, Machelle Erwin M.D.  •  oxyCODONE immediate-release (ROXICODONE) tablet 2.5 mg, 2.5 mg, Oral, Q3HRS PRN **OR** oxyCODONE immediate-release (ROXICODONE) tablet 5 mg, 5 mg, Oral, Q3HRS PRN **OR** HYDROmorphone (Dilaudid) injection 0.25 mg, 0.25 mg, Intravenous, Q3HRS PRN, Machelle Erwin M.D.  •  enalaprilat (VASOTEC) injection 1.25 mg, 1.25 mg, Intravenous, Q6HRS PRN, Machelle Erwin M.D.  •  labetalol (NORMODYNE/TRANDATE) injection 10 mg, 10 mg, Intravenous, Q4HRS PRN, Machelle Erwin M.D.  •  ondansetron (ZOFRAN) syringe/vial injection 4 mg, 4 mg, Intravenous, Q4HRS PRN, Machelle Erwin M.D., 4 mg at 21 1706  •  ondansetron (ZOFRAN ODT) dispertab 4 mg, 4 mg, Oral, Q4HRS PRN, Machelle Erwin M.D.  [unfilled]    Most Recent Vital Signs:  /60   Pulse 65   Temp 37.3 °C (99.1 °F) (Oral)   Resp 18   Ht 1.575 m (5' 2\")   Wt 56.5 kg (124 lb 9 oz)   SpO2 91%   BMI 22.78 kg/m²   Temp  Av.3 °C (99.2 °F)  Min: 36.5 °C (97.7 °F)  Max: 39.1 °C (102.3 °F)    Physical Exam:  Physical Exam  Constitutional:       Appearance: Normal appearance.   HENT:      Head: Normocephalic and atraumatic. "      Right Ear: External ear normal.      Left Ear: External ear normal.      Nose: Nose normal.      Mouth/Throat:      Mouth: Mucous membranes are dry.      Pharynx: Oropharynx is clear.   Eyes:      Extraocular Movements: Extraocular movements intact.      Conjunctiva/sclera: Conjunctivae normal.      Pupils: Pupils are equal, round, and reactive to light.   Cardiovascular:      Rate and Rhythm: Normal rate and regular rhythm.      Heart sounds: Normal heart sounds.   Pulmonary:      Effort: Pulmonary effort is normal.      Breath sounds: Normal breath sounds.   Abdominal:      General: Abdomen is flat. Bowel sounds are normal. There is no distension.      Palpations: Abdomen is soft.      Tenderness: There is abdominal tenderness. There is no guarding.      Comments: Left lower quadrant tenderness   Musculoskeletal:      Cervical back: Normal range of motion. No rigidity.      Right lower leg: No edema.      Left lower leg: No edema.   Skin:     General: Skin is warm and dry.   Neurological:      General: No focal deficit present.      Mental Status: She is alert and oriented to person, place, and time.   Psychiatric:         Mood and Affect: Mood normal.         Behavior: Behavior normal.             Pertinent Lab Results:  Recent Labs     07/31/21  0750 08/01/21  0218 08/02/21  0023   WBC 8.7 9.6 8.0      Recent Labs     07/31/21  0750 08/01/21  0218 08/02/21  0023   HEMOGLOBIN 13.6 14.6 12.4   HEMATOCRIT 38.9 40.7 33.9*   MCV 94.6 92.9 91.4   MCH 33.1* 33.3* 33.4*   PLATELETCT 216 183 198         Recent Labs     07/31/21  0144 07/31/21  0144 08/01/21  0218 08/01/21  0218 08/01/21  1015 08/01/21  1705 08/02/21  0023   SODIUM 128*   < > 123*   < > 124* 123* 125*   POTASSIUM 4.0  --  3.2*  --   --   --  3.6   CHLORIDE 90*  --  90*  --   --   --  92*   CO2 21  --  19*  --   --   --  22   CREATININE 0.55  --  0.44*  --   --   --  0.58    < > = values in this interval not displayed.        Recent Labs      "07/30/21  0931 08/01/21  0218   ALBUMIN 4.2 4.0        Pertinent Micro:  Results     Procedure Component Value Units Date/Time    BLOOD CULTURE [[id number]] Collected: 07/31/21 0750    Order Status: Completed Specimen: Blood from Peripheral Updated: 08/01/21 0908     Significant Indicator NEG     Source BLD     Site PERIPHERAL     Culture Result No Growth  Note: Blood cultures are incubated for 5 days and  are monitored continuously.Positive blood cultures  are called to the RN and reported as soon as  they are identified.      Narrative:      Per Hospital Policy: Only change Specimen Src: to \"Line\" if  specified by physician order.  Right AC    BLOOD CULTURE [[id number]] Collected: 07/31/21 0740    Order Status: Completed Specimen: Blood from Peripheral Updated: 08/01/21 0908     Significant Indicator NEG     Source BLD     Site PERIPHERAL     Culture Result No Growth  Note: Blood cultures are incubated for 5 days and  are monitored continuously.Positive blood cultures  are called to the RN and reported as soon as  they are identified.      Narrative:      Per Hospital Policy: Only change Specimen Src: to \"Line\" if  specified by physician order.  Right Hand    URINE CULTURE-EXISTING-LESS THAN 48 HOURS [[id number]]  (Abnormal) Collected: 07/30/21 1300    Order Status: Completed Specimen: Urine, Clean Catch Updated: 08/01/21 0815     Significant Indicator POS     Source UR     Site URINE, CLEAN CATCH     Culture Result -      Streptococcus agalactiae (Group B)  ,000 cfu/mL      Narrative:      Collected By:[id number] DARRYL DODGE  Indication for culture:->Patient WITHOUT an indwelling Lantigua  catheter in place with new onset of Dysuria, Frequency,  Urgency, and/or Suprapubic pain  Collected By:[id number] DARRYL DODGE    URINALYSIS [[id number]]  (Abnormal) Collected: 07/30/21 1250    Order Status: Completed Specimen: Urine Updated: 07/30/21 1337     Color Yellow     Character Cloudy     Specific Gravity " "1.025     Ph 6.5     Glucose Negative mg/dL      Ketones 40 mg/dL      Protein 30 mg/dL      Bilirubin Small     Nitrite Negative     Leukocyte Esterase Trace     Occult Blood Trace     Micro Urine Req Microscopic    COV-2, FLU A/B, AND RSV BY PCR (2-4 HOURS Cardiac Guard[facility]): Collect NP swab in [facility] [[id number]] Collected: 07/30/21 0931    Order Status: Completed Specimen: Respirate Updated: 07/30/21 1017     Influenza virus A RNA Negative     Influenza virus B, PCR Negative     RSV, PCR Negative     SARS-CoV-2 by PCR NotDetected     Comment: PATIENTS: Important information regarding your results and instructions can  be found at [email or web address]   \"After your  Covid-19 Test\"    [facility] providers: PLEASE REFER TO DE-ESCALATION AND RETESTING PROTOCOL  on [email or web address]    **The [employer] GeneXpert Xpress SARS-CoV-2 RT-PCR Test has been made  available for use under the Emergency Use Authorization (EUA) only.          SARS-CoV-2 Source NP Swab    Narrative:      Have you been in close contact with a person who is suspected  or known to be positive for COVID-19 within the last 30 days  (e.g. last seen that person < 30 days ago)->Unknown        No results found for: BLOODCULTU, BLDCULT, BCHOLD     Studies:  CT-ABDOMEN-PELVIS WITH    Result Date: 7/31/2021 7/31/2021 11:29 AM HISTORY/REASON FOR EXAM:  Abdominal pain. Urinary retention with painful urination. Fever. TECHNIQUE/EXAM DESCRIPTION:   CT scan of the abdomen and pelvis with contrast. Contrast-enhanced helical scanning was obtained from the diaphragmatic domes through the pubic symphysis following the bolus administration of nonionic contrast without complication. 100 mL of Omnipaque 350 nonionic contrast was administered without complication. Low dose optimization technique was utilized for this CT exam including automated exposure control and adjustment of the mA and/or kV according to patient size. COMPARISON: No prior studies available. " FINDINGS: Lower Chest: The visualized lung bases demonstrate minimal linear fibrotic change or atelectasis. Liver: Normal. Spleen: Unremarkable. Pancreas: Unremarkable. Gallbladder: No calcified stones. Biliary: There is minor intrahepatic biliary dilatation. There is mild dilatation of the common bile duct which measures 7 mm. No choledocholithiasis is identified. Adrenal glands: Normal. Kidneys: Unremarkable without hydronephrosis. Bowel: No obstruction or acute inflammation. There is an increased volume of stool throughout the colon. There is residual contrast in the distal colon The appendix appears normal. Lymph nodes: No adenopathy. Vasculature: Unremarkable. Peritoneum: Unremarkable without ascites. Musculoskeletal: No acute or destructive process. Pelvis: No adenopathy or free fluid. The bladder is markedly distended. No uterine or ovarian tissue is identified.     1.  Marked distention of the urinary bladder. No bladder wall thickening. 2.  No hydronephrosis. 3.  Minimal intrahepatic and extra hepatic biliary dilatation. No cholelithiasis. 4.  Increased volume of stool throughout the colon which may represent constipation.    CT-HEAD W/O    Result Date: 7/30/2021 7/30/2021 9:53 AM HISTORY/REASON FOR EXAM:  Neuro deficit, acute, stroke suspected These images. Nausea and vomiting. TECHNIQUE/EXAM DESCRIPTION AND NUMBER OF VIEWS: CT of the head without contrast. The study was performed on a helical multidetector CT scanner. Contiguous 2.5 mm axial sections were obtained from the skull base through the vertex. Up to date radiation dose reduction adjustments have been utilized to meet ALARA standards for radiation dose reduction. COMPARISON:  None available FINDINGS: There is no evidence of acute intracranial hemorrhage, mass, mass-effect or shift of midline structures. Gray-white matter differentiation is grossly preserved. There is hypoattenuation of the periventricular and subcortical white matter, in keeping  with chronic microangiopathic ischemic changes. Ventricle size and brain parenchymal volume are appropriate for this patient's stated age. The basal cisterns are patent. There are no abnormal extra-axial fluid collections. No depressed or widely  calvarial fracture is seen. The visualized paranasal sinuses and temporal bone structures are aerated. ___________________________________     1. No acute intracranial findings. No evidence of acute intracranial hemorrhage or mass lesion. 2. White matter lucencies most consistent with chronic small vessel ischemic change.     [id number] VIEW    Result Date: 7/30/2021 7/30/2021 10:13 AM HISTORY/REASON FOR EXAM:  Gastrointestinal Complaint dizziness TECHNIQUE/EXAM DESCRIPTION AND NUMBER OF VIEWS:  1 view(s) of the abdomen. COMPARISON: None FINDINGS: Moderate amount of stool throughout the colon. Nonspecific nonobstructive bowel gas pattern. No dilated gas-filled loop of small bowel. No portal venous gas or pneumatosis. No definite free intraperitoneal air but evaluation is limited on supine radiograph.     No specific finding to suggest small bowel obstruction.    DX-CHEST-PORTABLE (1 VIEW)    Result Date: 7/30/2021 7/30/2021 10:21 AM HISTORY/REASON FOR EXAM:  fatigue, loss of taste and smell Dizziness. Nausea. TECHNIQUE/EXAM DESCRIPTION AND NUMBER OF VIEWS: Single portable view of the chest. COMPARISON: 9/16/2019 FINDINGS: No pulmonary infiltrates or consolidations are noted. No pleural effusion. No pneumothorax. Stable cardiopericardial silhouette.     1. No acute cardiopulmonary abnormalities are identified.    MR-BRAIN-W/O    Result Date: 7/30/2021 7/30/2021 3:07 PM HISTORY/REASON FOR EXAM:  Dizziness, non-specific. TECHNIQUE/EXAM DESCRIPTION: MRI of the brain without contrast. T1 sagittal, T2 fast spin-echo axial, T1 coronal, FLAIR coronal, diffusion-weighted and apparent diffusion coefficient (ADC map) axial images were obtained of the whole brain. The  study was performed on a Dig[employer]end Signa 1.5 Smita MRI scanner. COMPARISON:  None. FINDINGS: There is no acute infarct. There is no acute or chronic parenchymal hemorrhage. A few nonspecific T2 hyperintensities in the subcortical and periventricular white matter. Mild cerebral volume loss is seen. The pituitary, hypothalamic and the pineal regions are unremarkable. There are a few punctate nonspecific T2 hyperintensities in the subcortical white matter. There is no extra-axial fluid collection, hemorrhage or mass. There is no hippocampal volume loss or signal change. The ventricles, cortical sulci and the basal cisterns are prominent consistent with mild cerebral volume loss. The visualized flow voids of the cerebral vasculature are unremarkable.  There is no large lesion identified in the expected course of the intracranial portions of the cranial nerves. The skull bones are unremarkable. The paranasal sinuses are clear. The extracranial soft tissue including orbits appear grossly normal.     1.  No acute abnormality. 2.  Mild cerebral volume loss. 3.  Mild chronic microvascular ischemic disease.    EC-ECHOCARDIOGRAM COMPLETE W/O CONT    Result Date: 2021  Transthoracic Echo Report Echocardiography Laboratory CONCLUSIONS No prior study is available for comparison. Normal left ventricular systolic function. Left ventricular ejection fraction is visually estimated to be 65%. Grade I diastolic dysfunction. Normal right ventricular size and systolic function. No significant valve disease or flow abnormalities. ZAFAR SANDERS Exam Date:         2021                    08:57 Exam Location:     Inpatient Priority:          Routine Ordering Physician:        FLORECITA MCMANUS Referring Physician:       [id number] Hernandez Sonographer:               Meagan Maguire [facility] Age:    75     Gender:    F MRN:    [id number] :    1945 BSA:    1.57   Ht (in):    62     Wt (lb):    125 Exam Type:     Complete Indications:      Abnormal electrocardiogram ECG EKG ICD Codes:       R94.31 CPT Codes:       94990 BP:   129    /   61     HR:   60 Technical Quality:       Fair MEASUREMENTS  (Male / Female) Normal Values 2D ECHO LV Diastolic Diameter PLAX        3.8 cm                4.2 - 5.9 / 3.9 - 5.3 cm LV Systolic Diameter PLAX         1.8 cm                2.1 - 4.0 cm IVS Diastolic Thickness           0.83 cm               LVPW Diastolic Thickness          0.67 cm               LVOT Diameter                     1.8 cm                Estimated LV Ejection Fraction    65 %                  LV Ejection Fraction MOD BP       64.9 %                >= 55  % LV Ejection Fraction MOD 4C       71.9 %                LV Ejection Fraction MOD 2C       56.7 %                IVC Diameter                      1.2 cm                DOPPLER AV Peak Velocity                  1.5 m/s               AV Peak Gradient                  8.5 mmHg              AV Mean Gradient                  4.7 mmHg              LVOT Peak Velocity                1.1 m/s               AV Area Cont Eq vti               2 cm2                 MV Velocity Time Integral         21.1 cm               Mitral E Point Velocity           0.72 m/s              Mitral E to A Ratio               0.74                  MV Pressure Half Time             49.5 ms               MV Area PHT                       4.4 cm2               MV Deceleration Time              171 ms                PV Peak Velocity                  1.1 m/s               PV Peak Gradient                  4.8 mmHg              RVOT Peak Velocity                0.51 m/s              * Indicates values subject to auto-interpretation LV EF:  65    % FINDINGS Left Ventricle Normal left ventricular chamber size. Normal left ventricular wall thickness. Normal left ventricular systolic function. Left ventricular ejection fraction is visually estimated to be 65%. Normal regional wall motion. Grade I diastolic dysfunction. Right  Ventricle Normal right ventricular size and systolic function. Right Atrium Normal right atrial size. Normal inferior vena cava size and inspiratory collapse. Left Atrium Mildly dilated left atrium. Left atrial volume index is 35 mL/sq m. Mitral Valve Mild mitral annular calcification. No mitral stenosis. No mitral regurgitation. Aortic Valve Structurally normal aortic valve. No aortic stenosis. Trace aortic insufficiency. Tricuspid Valve Structurally normal tricuspid valve. No tricuspid stenosis. Trace tricuspid regurgitation. Unable to estimate pulmonary artery pressure due to an inadequate tricuspid regurgitant jet. Pulmonic Valve The pulmonic valve is not well visualized. No stenosis or regurgitation seen. Pericardium No pericardial effusion seen. Aorta Normal aortic root for body surface area. Ascending aorta diameter is 3.1 cm. Bryson Barrios MD (Electronically Signed) Final Date:     01 August 2021                 12:57      ASSESSMENT/PLAN:     75 y.o.  admitted 7/30/2021. Pt has a past medical history of tobacco abuse and presented complaining of vertigo nausea and vomiting.  She endorsed headache x4 days, vertigo, abdominal pain and some loss of appetite.  She also reported dysuria.      Hospital Course:   Patient febrile to 102.3 and continues to spike fevers.  Other vitals in normal limits and she is on room air.  No leukocytosis.  Procalcitonin unremarkable.  MRI brain without contrast unremarkable.  CT abdomen pelvis with contrast with distention of urinary bladder, no wall thickening, kidneys unremarkable, no hydronephrosis,  mild biliary ductal dilation.  Blood cultures were obtained and so far no growth.  Urine culture with growth of group B strep.  Today she continues to endorse headache described as pain behind her eyes, dizziness and some ongoing nausea.  GIs been consulted and plan is for potential EGD tomorrow.  MRI abdomen is pending.    Problem List     Fevers, ongoing, none since early  "this morning but also has been on Tylenol  Headache ongoing, frontal \"behind eyes\"  - No sinus ttp or drainage   Nausea vomiting and vertigo  -CT and MR head unremarkable although without contrast  UTI, described dysuria and fevers   -CT abdomen pelvis with contrast with no obvious pyelonephritis    Plan     --- Differential remains broad at this point given multiple nonspecific symptoms.  She has abdominal complaints of pain as l as well as nausea and vomiting.  Ongoing headache is new and would consider migraine although it is not described as pounding and she does not have any photophobia.  Pyelonephritis would explain the nausea and vomiting and fevers but CT abdomen pelvis was unremarkable.   --- Plan is for potential EGD tomorrow pending cardiac approval, troponin normal. If this is unremarkable and she continues to have fevers with complaint of headache would pursue LP and potentially repeat imaging of head with contrast.  --- If any new vision changes or pain in the eyes consider uveitis and request ophthalmology eval  --- Continue ceftriaxone for now for the group B strep UTI      Plan of care discussed with CATHLEEN Erwin M.D.. Will continue to follow    Malorie Cai M.D.  "

## 2021-08-02 NOTE — ASSESSMENT & PLAN NOTE
Unsure etiology, CT head negative, MRI brain without negative for acute findings  No other neuro sx's currently  ID consulted, possible LP   Pain mgmt

## 2021-08-02 NOTE — CARE PLAN
Problem: Nutritional:  Goal: Achieve adequate nutritional intake  Description: Patient will consume >/=50% of meals and/or supplements  Outcome: Progressing     Pt said that her appetite has improved slightly. See RD note for details.

## 2021-08-02 NOTE — CARE PLAN
The patient is Stable - Low risk of patient condition declining or worsening    Shift Goals  Clinical Goals: (P) Balance electrolytes   Patient Goals: (P) Eat 25-50% of meals  Family Goals: (P) Receive education on plan of care.    Progress made toward(s) clinical / shift goals:    Problem: Hemodynamic Monitoring  Goal: Patient's hemodynamics, fluid balance and neurologic status will be stable or improve  Outcome: Progressing     Problem: Self Care  Goal: Patient will have the ability to perform ADLs independently or with assistance (bathe, groom, dress, toilet and feed)  Outcome: Progressing  Note: Patient is self-motivated to perform hygienic activities. Bed bath performed per patient request.     Problem: Knowledge Deficit - Standard  Goal: Patient and family/care givers will demonstrate understanding of plan of care, disease process/condition, diagnostic tests and medications  Outcome: Progressing  Note: Patient and family have been educated on plan of care by members of care team. Education provided on discharge barriers.        Patient is not progressing towards the following goals:

## 2021-08-02 NOTE — DOCUMENTATION QUERY
This patient had a MRI 7-. i asked the RN if any new implants had been placed. She said no. No reason to fill out another screening sheet.

## 2021-08-03 ENCOUNTER — ANESTHESIA (OUTPATIENT)
Dept: SURGERY | Facility: MEDICAL CENTER | Age: 76
DRG: 690 | End: 2021-08-03
Payer: MEDICARE

## 2021-08-03 ENCOUNTER — APPOINTMENT (OUTPATIENT)
Dept: RADIOLOGY | Facility: MEDICAL CENTER | Age: 76
DRG: 690 | End: 2021-08-03
Attending: INTERNAL MEDICINE
Payer: MEDICARE

## 2021-08-03 ENCOUNTER — ANESTHESIA EVENT (OUTPATIENT)
Dept: SURGERY | Facility: MEDICAL CENTER | Age: 76
DRG: 690 | End: 2021-08-03
Payer: MEDICARE

## 2021-08-03 ENCOUNTER — HOME HEALTH ADMISSION (OUTPATIENT)
Dept: HOME HEALTH SERVICES | Facility: HOME HEALTHCARE | Age: 76
End: 2021-08-03
Payer: MEDICARE

## 2021-08-03 LAB
ANION GAP SERPL CALC-SCNC: 10 MMOL/L (ref 7–16)
BASOPHILS # BLD AUTO: 0.7 % (ref 0–1.8)
BASOPHILS # BLD: 0.06 K/UL (ref 0–0.12)
BUN SERPL-MCNC: 6 MG/DL (ref 8–22)
CALCIUM SERPL-MCNC: 8.4 MG/DL (ref 8.4–10.2)
CHLORIDE SERPL-SCNC: 96 MMOL/L (ref 96–112)
CO2 SERPL-SCNC: 21 MMOL/L (ref 20–33)
CREAT SERPL-MCNC: 0.44 MG/DL (ref 0.5–1.4)
EOSINOPHIL # BLD AUTO: 0.03 K/UL (ref 0–0.51)
EOSINOPHIL NFR BLD: 0.4 % (ref 0–6.9)
ERYTHROCYTE [DISTWIDTH] IN BLOOD BY AUTOMATED COUNT: 39.7 FL (ref 35.9–50)
GLUCOSE SERPL-MCNC: 110 MG/DL (ref 65–99)
H PYLORI AG STL QL IA: NOT DETECTED
HCT VFR BLD AUTO: 38.7 % (ref 37–47)
HGB BLD-MCNC: 13.4 G/DL (ref 12–16)
IMM GRANULOCYTES # BLD AUTO: 0.05 K/UL (ref 0–0.11)
IMM GRANULOCYTES NFR BLD AUTO: 0.6 % (ref 0–0.9)
LYMPHOCYTES # BLD AUTO: 1.3 K/UL (ref 1–4.8)
LYMPHOCYTES NFR BLD: 15.2 % (ref 22–41)
MAGNESIUM SERPL-MCNC: 1.9 MG/DL (ref 1.5–2.5)
MCH RBC QN AUTO: 33.2 PG (ref 27–33)
MCHC RBC AUTO-ENTMCNC: 34.6 G/DL (ref 33.6–35)
MCV RBC AUTO: 95.8 FL (ref 81.4–97.8)
MONOCYTES # BLD AUTO: 0.74 K/UL (ref 0–0.85)
MONOCYTES NFR BLD AUTO: 8.6 % (ref 0–13.4)
NEUTROPHILS # BLD AUTO: 6.39 K/UL (ref 2–7.15)
NEUTROPHILS NFR BLD: 74.5 % (ref 44–72)
NRBC # BLD AUTO: 0 K/UL
NRBC BLD-RTO: 0 /100 WBC
PATHOLOGY CONSULT NOTE: NORMAL
PLATELET # BLD AUTO: 183 K/UL (ref 164–446)
PMV BLD AUTO: 10.9 FL (ref 9–12.9)
POTASSIUM SERPL-SCNC: 4.2 MMOL/L (ref 3.6–5.5)
RBC # BLD AUTO: 4.04 M/UL (ref 4.2–5.4)
SODIUM SERPL-SCNC: 127 MMOL/L (ref 135–145)
WBC # BLD AUTO: 8.6 K/UL (ref 4.8–10.8)

## 2021-08-03 PROCEDURE — 88312 SPECIAL STAINS GROUP 1: CPT

## 2021-08-03 PROCEDURE — 94760 N-INVAS EAR/PLS OXIMETRY 1: CPT

## 2021-08-03 PROCEDURE — 160009 HCHG ANES TIME/MIN: Performed by: INTERNAL MEDICINE

## 2021-08-03 PROCEDURE — 700102 HCHG RX REV CODE 250 W/ 637 OVERRIDE(OP): Performed by: INTERNAL MEDICINE

## 2021-08-03 PROCEDURE — 80048 BASIC METABOLIC PNL TOTAL CA: CPT

## 2021-08-03 PROCEDURE — 770006 HCHG ROOM/CARE - MED/SURG/GYN SEMI*

## 2021-08-03 PROCEDURE — 160048 HCHG OR STATISTICAL LEVEL 1-5: Performed by: INTERNAL MEDICINE

## 2021-08-03 PROCEDURE — 700105 HCHG RX REV CODE 258: Performed by: INTERNAL MEDICINE

## 2021-08-03 PROCEDURE — 700111 HCHG RX REV CODE 636 W/ 250 OVERRIDE (IP): Performed by: ANESTHESIOLOGY

## 2021-08-03 PROCEDURE — 88305 TISSUE EXAM BY PATHOLOGIST: CPT | Mod: 59

## 2021-08-03 PROCEDURE — A9270 NON-COVERED ITEM OR SERVICE: HCPCS | Performed by: INTERNAL MEDICINE

## 2021-08-03 PROCEDURE — 83735 ASSAY OF MAGNESIUM: CPT

## 2021-08-03 PROCEDURE — 74181 MRI ABDOMEN W/O CONTRAST: CPT | Mod: MG

## 2021-08-03 PROCEDURE — 0DB68ZX EXCISION OF STOMACH, VIA NATURAL OR ARTIFICIAL OPENING ENDOSCOPIC, DIAGNOSTIC: ICD-10-PCS | Performed by: INTERNAL MEDICINE

## 2021-08-03 PROCEDURE — 160202 HCHG ENDO MINUTES - 1ST 30 MINS LEVEL 3: Performed by: INTERNAL MEDICINE

## 2021-08-03 PROCEDURE — 700111 HCHG RX REV CODE 636 W/ 250 OVERRIDE (IP): Performed by: INTERNAL MEDICINE

## 2021-08-03 PROCEDURE — 160002 HCHG RECOVERY MINUTES (STAT): Performed by: INTERNAL MEDICINE

## 2021-08-03 PROCEDURE — 160035 HCHG PACU - 1ST 60 MINS PHASE I: Performed by: INTERNAL MEDICINE

## 2021-08-03 PROCEDURE — 99233 SBSQ HOSP IP/OBS HIGH 50: CPT | Performed by: INTERNAL MEDICINE

## 2021-08-03 PROCEDURE — 700101 HCHG RX REV CODE 250: Performed by: ANESTHESIOLOGY

## 2021-08-03 PROCEDURE — C9113 INJ PANTOPRAZOLE SODIUM, VIA: HCPCS | Performed by: INTERNAL MEDICINE

## 2021-08-03 PROCEDURE — 85025 COMPLETE CBC W/AUTO DIFF WBC: CPT

## 2021-08-03 RX ORDER — LIDOCAINE HYDROCHLORIDE 20 MG/ML
INJECTION, SOLUTION EPIDURAL; INFILTRATION; INTRACAUDAL; PERINEURAL PRN
Status: DISCONTINUED | OUTPATIENT
Start: 2021-08-03 | End: 2021-08-03 | Stop reason: SURG

## 2021-08-03 RX ORDER — ONDANSETRON 2 MG/ML
4 INJECTION INTRAMUSCULAR; INTRAVENOUS
Status: DISCONTINUED | OUTPATIENT
Start: 2021-08-03 | End: 2021-08-03 | Stop reason: HOSPADM

## 2021-08-03 RX ORDER — SODIUM CHLORIDE, SODIUM LACTATE, POTASSIUM CHLORIDE, CALCIUM CHLORIDE 600; 310; 30; 20 MG/100ML; MG/100ML; MG/100ML; MG/100ML
INJECTION, SOLUTION INTRAVENOUS CONTINUOUS
Status: ACTIVE | OUTPATIENT
Start: 2021-08-03 | End: 2021-08-03

## 2021-08-03 RX ORDER — OMEPRAZOLE 20 MG/1
20 CAPSULE, DELAYED RELEASE ORAL 2 TIMES DAILY
Status: DISCONTINUED | OUTPATIENT
Start: 2021-08-03 | End: 2021-08-06 | Stop reason: HOSPADM

## 2021-08-03 RX ORDER — ACETAMINOPHEN 325 MG/1
650 TABLET ORAL EVERY 4 HOURS PRN
Status: DISCONTINUED | OUTPATIENT
Start: 2021-08-03 | End: 2021-08-03 | Stop reason: HOSPADM

## 2021-08-03 RX ORDER — DIPHENHYDRAMINE HYDROCHLORIDE 50 MG/ML
12.5 INJECTION INTRAMUSCULAR; INTRAVENOUS
Status: DISCONTINUED | OUTPATIENT
Start: 2021-08-03 | End: 2021-08-03 | Stop reason: HOSPADM

## 2021-08-03 RX ORDER — HALOPERIDOL 5 MG/ML
1 INJECTION INTRAMUSCULAR
Status: DISCONTINUED | OUTPATIENT
Start: 2021-08-03 | End: 2021-08-03 | Stop reason: HOSPADM

## 2021-08-03 RX ADMIN — SODIUM CHLORIDE 1 G: 1 TABLET ORAL at 08:32

## 2021-08-03 RX ADMIN — DOCUSATE SODIUM 50 MG AND SENNOSIDES 8.6 MG 2 TABLET: 8.6; 5 TABLET, FILM COATED ORAL at 05:53

## 2021-08-03 RX ADMIN — SODIUM CHLORIDE 1 G: 1 TABLET ORAL at 12:24

## 2021-08-03 RX ADMIN — ACETAMINOPHEN 650 MG: 325 TABLET, FILM COATED ORAL at 01:04

## 2021-08-03 RX ADMIN — CEFTRIAXONE SODIUM 1 G: 1 INJECTION, POWDER, FOR SOLUTION INTRAMUSCULAR; INTRAVENOUS at 05:56

## 2021-08-03 RX ADMIN — SODIUM CHLORIDE 1 G: 1 TABLET ORAL at 17:14

## 2021-08-03 RX ADMIN — OMEPRAZOLE 20 MG: 20 CAPSULE, DELAYED RELEASE ORAL at 17:14

## 2021-08-03 RX ADMIN — LIDOCAINE HYDROCHLORIDE 80 MG: 20 INJECTION, SOLUTION EPIDURAL; INFILTRATION; INTRACAUDAL; PERINEURAL at 15:42

## 2021-08-03 RX ADMIN — PANTOPRAZOLE SODIUM 40 MG: 40 INJECTION, POWDER, LYOPHILIZED, FOR SOLUTION INTRAVENOUS at 05:53

## 2021-08-03 RX ADMIN — PROPOFOL 200 MCG/KG/MIN: 10 INJECTION, EMULSION INTRAVENOUS at 15:44

## 2021-08-03 RX ADMIN — POTASSIUM CHLORIDE: 2 INJECTION, SOLUTION, CONCENTRATE INTRAVENOUS at 09:21

## 2021-08-03 RX ADMIN — SODIUM CHLORIDE, POTASSIUM CHLORIDE, SODIUM LACTATE AND CALCIUM CHLORIDE: 600; 310; 30; 20 INJECTION, SOLUTION INTRAVENOUS at 15:07

## 2021-08-03 RX ADMIN — ATORVASTATIN CALCIUM 40 MG: 40 TABLET, FILM COATED ORAL at 17:14

## 2021-08-03 RX ADMIN — PROPOFOL 60 MG: 10 INJECTION, EMULSION INTRAVENOUS at 15:42

## 2021-08-03 ASSESSMENT — ENCOUNTER SYMPTOMS
BLURRED VISION: 0
SENSORY CHANGE: 0
SHORTNESS OF BREATH: 0
NAUSEA: 1
HEADACHES: 1
VOMITING: 0
FEVER: 1
CHILLS: 1
ABDOMINAL PAIN: 0
SORE THROAT: 0
DIZZINESS: 0
DIARRHEA: 0
FOCAL WEAKNESS: 0
COUGH: 0
CONSTIPATION: 1
FALLS: 0

## 2021-08-03 ASSESSMENT — COGNITIVE AND FUNCTIONAL STATUS - GENERAL
TOILETING: A LITTLE
WALKING IN HOSPITAL ROOM: A LITTLE
SUGGESTED CMS G CODE MODIFIER MOBILITY: CK
MOVING FROM LYING ON BACK TO SITTING ON SIDE OF FLAT BED: A LITTLE
STANDING UP FROM CHAIR USING ARMS: A LITTLE
TURNING FROM BACK TO SIDE WHILE IN FLAT BAD: A LITTLE
HELP NEEDED FOR BATHING: A LITTLE
PERSONAL GROOMING: A LITTLE
CLIMB 3 TO 5 STEPS WITH RAILING: A LOT
DRESSING REGULAR LOWER BODY CLOTHING: A LITTLE
DRESSING REGULAR UPPER BODY CLOTHING: A LITTLE
MOBILITY SCORE: 17
MOVING TO AND FROM BED TO CHAIR: A LITTLE
DAILY ACTIVITIY SCORE: 19
SUGGESTED CMS G CODE MODIFIER DAILY ACTIVITY: CK

## 2021-08-03 ASSESSMENT — PAIN DESCRIPTION - PAIN TYPE
TYPE: ACUTE PAIN

## 2021-08-03 ASSESSMENT — PAIN SCALES - GENERAL: PAIN_LEVEL: 0

## 2021-08-03 NOTE — PROGRESS NOTES
Medicated per MAR, encouraged patient to sit up to chair for dinner, patient denied then encouraged patient to sit on the edge of bed for dinner, patient stated she wanted to stay in bed for dinner.

## 2021-08-03 NOTE — PROGRESS NOTES
"Hospital Medicine Daily Progress Note    Date of Service  8/3/2021    Chief Complaint  Karla Prince is a 75 y.o. female admitted 7/30/2021 with vertigo, headache, nausea/vomiting    Hospital Course  Karla Prince is a 75 y.o. female with history of osteoporosis, dyslipidemia, tobacco use who presented 7/30/2021 with vertigo, nausea/vomiting.     Patient was in her usual state of health until wednesday 7/28 in the am, got out of bed and developed acute dizziness and n/v.  She has since been having dry heaves, \"bile\" for the last few days.  Patient also reports she has developed new headache last 4 days (not normally headache person). \"skin hurts\", and reports chills but no fever.  Has had vertigo in the past if she's had the \"flu\" or \"cant eat\".  Patient also reports intermittent abdominal pain, LUQ, none now.  Her  reports he has noticed a gradual loss of appetite, lost her taste, after she had her second covid vaccine, tongue was numb, now better, can taste.  He also reports she has been sleeping a lot, all day long which is very abnormal for patient.  Does report she's had burning during urination. Smokes 4 cigarettes.       In the ED she was found to be afebrile, normal blood pressure and heart rate.  Labs remarkable for sodium 128.  Chest x-ray negative.'s abdominal x-ray negative.  CT head negative for acute intracranial findings does have white matter lucency most consistent with chronic small vessel ischemic change.    MRI brain negative for stroke.  She spiked a fever overnight on day of admission to 102F.  Blood cultures obtained.  Urine grew GBS.  She was started on CTX.      She had persistent nausea/vomiting so CT AP obtained which showed very distended bladder and constipation.  Lantigua was placed and bowel regimen increased.      Her sodium level decreased despite IVF. Salt tabs started 8/1.      On 8/2 she began having fevers again.      Interval Problem Update  Patient hypertensive this morning " otherwise vital signs stable.  Afebrile overnight.  MRCP pending.  EGD today. EKG yesterday showed minimal ST elevations inferior leads, torp negative, and echo wnl, discussed with cardiology who recommended no further workup.  Patient reports she is having some mild abdominal pain today and has constipation. Also still reporting a headache and some very mild left facial swelling.     I have personally seen and examined the patient at bedside. I discussed the plan of care with patient, family, bedside RN and .    Consultants/Specialty  infectious disease     Code Status  DNAR/DNI    Disposition  Patient is not medically cleared.   Anticipate discharge to to home with close outpatient follow-up.  I have placed the appropriate orders for post-discharge needs.    Review of Systems  Review of Systems   Constitutional: Positive for chills, fever and malaise/fatigue.   HENT: Negative for congestion and sore throat.    Eyes: Negative for blurred vision.   Respiratory: Negative for cough and shortness of breath.    Cardiovascular: Negative for chest pain and leg swelling.   Gastrointestinal: Positive for constipation and nausea. Negative for abdominal pain, diarrhea and vomiting.   Genitourinary: Negative for dysuria.        Difficulty urinating, now with frankel   Musculoskeletal: Negative for falls.   Skin: Negative for rash.   Neurological: Positive for headaches. Negative for dizziness, sensory change and focal weakness.        Physical Exam  Temp:  [36.4 °C (97.6 °F)-37.7 °C (99.9 °F)] 36.4 °C (97.6 °F)  Pulse:  [58-68] 62  Resp:  [16-20] 16  BP: (120-176)/(58-74) 176/68  SpO2:  [91 %-95 %] 95 %    Physical Exam  Vitals and nursing note reviewed.   Constitutional:       General: She is not in acute distress.     Appearance: She is ill-appearing.   HENT:      Head: Normocephalic and atraumatic.   Eyes:      Conjunctiva/sclera: Conjunctivae normal.   Cardiovascular:      Rate and Rhythm: Normal rate and regular  rhythm.      Heart sounds: No murmur heard.     Pulmonary:      Effort: Pulmonary effort is normal. No respiratory distress.      Breath sounds: Normal breath sounds.   Abdominal:      General: Abdomen is flat. There is no distension.      Palpations: Abdomen is soft.      Tenderness: There is abdominal tenderness (mild).   Genitourinary:     Comments: Lantigua in place  Musculoskeletal:      Cervical back: Neck supple.      Right lower leg: No edema.      Left lower leg: No edema.   Skin:     General: Skin is warm.      Coloration: Skin is not jaundiced.      Findings: No rash.   Neurological:      Mental Status: She is alert and oriented to person, place, and time.   Psychiatric:         Mood and Affect: Mood normal.         Behavior: Behavior normal.         Fluids    Intake/Output Summary (Last 24 hours) at 8/3/2021 1239  Last data filed at 8/3/2021 0632  Gross per 24 hour   Intake 1659.8 ml   Output 3970 ml   Net -2310.2 ml       Laboratory  Recent Labs     08/01/21  0218 08/02/21  0023 08/03/21  0832   WBC 9.6 8.0 8.6   RBC 4.38 3.71* 4.04*   HEMOGLOBIN 14.6 12.4 13.4   HEMATOCRIT 40.7 33.9* 38.7   MCV 92.9 91.4 95.8   MCH 33.3* 33.4* 33.2*   MCHC 35.9* 36.6* 34.6   RDW 38.5 38.0 39.7   PLATELETCT 183 198 183   MPV 11.3 10.5 10.9     Recent Labs     08/01/21  0218 08/01/21  1015 08/02/21  0023 08/02/21  0918 08/03/21  0832   SODIUM 123*   < > 125* 126* 127*   POTASSIUM 3.2*  --  3.6  --  4.2   CHLORIDE 90*  --  92*  --  96   CO2 19*  --  22  --  21   GLUCOSE 92  --  126*  --  110*   BUN 9  --  8  --  6*   CREATININE 0.44*  --  0.58  --  0.44*   CALCIUM 8.5  --  8.0*  --  8.4    < > = values in this interval not displayed.                   Imaging  EC-ECHOCARDIOGRAM COMPLETE W/O CONT   Final Result      CT-ABDOMEN-PELVIS WITH   Final Result      1.  Marked distention of the urinary bladder. No bladder wall thickening.      2.  No hydronephrosis.      3.  Minimal intrahepatic and extra hepatic biliary dilatation.  No cholelithiasis.      4.  Increased volume of stool throughout the colon which may represent constipation.      MR-BRAIN-W/O   Final Result      1.  No acute abnormality.   2.  Mild cerebral volume loss.   3.  Mild chronic microvascular ischemic disease.      DX-CHEST-PORTABLE (1 VIEW)   Final Result         1. No acute cardiopulmonary abnormalities are identified.      WO-TSBHBXT-2 VIEW   Final Result         No specific finding to suggest small bowel obstruction.      CT-HEAD W/O   Final Result         1. No acute intracranial findings. No evidence of acute intracranial hemorrhage or mass lesion.      2. White matter lucencies most consistent with chronic small vessel ischemic change.               SP-LEUIWYY-S/O    (Results Pending)        Assessment/Plan  * Intractable vomiting with nausea  Assessment & Plan  Vomiting improved but still with nausea and poor po intake  Unsure etiology, could be related to possible UTI vs constipation vs gastritis/PUD vs malignancy? Getting MRCP today although nothing on CT AP  MRI brain negative (noncon), CT AP showed urinary retention and constipation  Nausea meds  H. Pylori, started PPI   IVF  Dietician consult  If no improvement in next 1-2 days will likely need cortrak placed for enteral nutrition as it's been ~5-6 days of very poor po intake  GI consulted, plan for MRCP and EGD today    Fever  Assessment & Plan  Fevers have returned, unknown etiology, malignancy? Encephalitis/meningitis? ucx growing GBS but been on CTX > 48 hours, bcx negative, echo negative, only symptoms are headache and nausea/vomiting, may need LP  - consulting ID   - continue ceftriaxone   - possible LP pending MRCP results     New daily persistent headache  Assessment & Plan  Unsure etiology, CT head negative, MRI brain without negative for acute findings  No other neuro sx's currently  ID consulted, possible LP   Pain mgmt    Urinary retention  Assessment & Plan  Patient developed urinary retention,  UTI +/- meclizine?  Lantigua placed 8/1  CTX for UTI    Acute cystitis without hematuria  Assessment & Plan  Patient spiked fever overnight, will check labs, get bcx and start empiric CTX (source likely urine since did have some dysuria a few days ago, now improved though and UA not that impressive and had a lot of epithelial cells).  Did not have any elevated WBC yesterday.  No other SIRS criteria yesterday or overnight except temp.  Inflammatory markers and procal negative.   - Ucx growing GBS  - bcx NGTD  - IVF  - finished 3 day course of ceftriaxone     Hyponatremia  Assessment & Plan  Thought likely related to decreased PO intake however worsening with IVF, SIADH? Labs mixed picture: high urine sodium but low urine osm, low serum osm, getting better with salt tabs and fluid restriction  IVF decreased, salt tabs 8/1  CTM    Vertigo  Assessment & Plan  Improving  Patient presents with acute vertigo, n/v, headache and severe fatigue however 4 days ago, nystagmus but otherwise normal neuro exam, no TPA, no permissive HTN  Diff dx: stroke vs BPPV --> dehdyration vs UTI -->dehdyration  CT head negative  MRI brain negative for acute findings  PT/OT  Lipid panel elevated             Dyslipidemia- (present on admission)  Assessment & Plan  Elevated cholesterol  Started statin       VTE prophylaxis: enoxaparin ppx    I have performed a physical exam and reviewed and updated ROS and Plan today (8/3/2021). In review of yesterday's note (8/2/2021), there are no changes except as documented above.

## 2021-08-03 NOTE — DISCHARGE PLANNING
ATTN: Case Management  RE: Referral for Home Health    As of 08/03/21, we have accepted the Home Health referral for the patient listed above.    A [facility] clinician will be out to see the patient within 48 hours. If you have any questions or concerns regarding the patient's transition to Home Health, please do not hesitate to contact us at [phone].      We look forward to collaborating with you,  [facility] Health Team

## 2021-08-03 NOTE — OP REPORT
DATE OF SERVICE:  08/03/2021     INDICATION FOR PROCEDURE:  nausea and vomiting    PROCEDURE PERFORMED: EGD with biopsies     CONSENT:  Informed consent was obtained directly from the patient after benefits, risks and possible alternatives were discussed.     MEDICATIONS:  The patient received Propofol anesthesia for this procedure. Please see the anesthesia record for details       PROCEDURE DESCRIPTION:  The patient was placed in the left lateral decubitus position and provided with supplemental oxygen via nasal cannula.  Vital signs were monitored continuously throughout the procedure.  When ready, the upper endoscope was placed in the patient's mouth and advanced easily and carefully to the esophagus and subsequently to the stomach and duodenum.The scope was slowly withdrawn and mucosa carefully examined. Retroflexion was performed in the stomach. The patients toleration of this procedure was excellent     FINDINGS:    Esophagus: normal    Stomach: multiple linear erosions with stigmata of bleeding. Biopsies were taken of the erosions and to r/o h.pylori    Duodenum: normal         COMPLICATIONS:  No complications or blood loss during or in the immediate postoperative period.     IMPRESSION:  1.  erosive gastitis     RECOMMENDATION:    1. F/u biopsies  2. BID PPI  3. Restart diet  4. Treat H.pylori if found  5. Limit NSAIDs  6. F/u in clinic as outpatient with Dr Contreras    Please call with questions. We will sign off

## 2021-08-03 NOTE — ANESTHESIA POSTPROCEDURE EVALUATION
Patient: Karla Prince    Procedure Summary     Date: 08/03/21 Room / Location:  ENDOSCOPIC ULTRASOUND ROOM / SURGERY [facility]    Anesthesia Start: 1536 Anesthesia Stop: 1557    Procedure: GASTROSCOPY (N/A ) Diagnosis:       Hiatal hernia      Gastric erosions      (Hiatal hernia [[id number]])    Surgeons: Darrell Iverson M.D. Responsible Provider: Jami Chirinos M.D.    Anesthesia Type: general ASA Status: 2          Final Anesthesia Type: general  Last vitals  BP   Blood Pressure : (!) 162/70    Temp   36.8 °C (98.2 °F)    Pulse   63   Resp   16    SpO2   93 %      Anesthesia Post Evaluation    Patient location during evaluation: PACU  Patient participation: complete - patient participated  Level of consciousness: awake and alert  Pain score: 0    Airway patency: patent  Anesthetic complications: no  Cardiovascular status: hemodynamically stable  Respiratory status: acceptable  Hydration status: euvolemic    PONV: none          No complications documented.     Nurse Pain Score: 0 (NPRS)

## 2021-08-03 NOTE — PROGRESS NOTES
Assumed report and patient care from Michi CHÁVEZ via bedside report. Patient is resting comfortably in bed with no signs of labored breathing or restlessness. POC discussed. No questions or concerns at this time. Safety measures in place, call light within reach.

## 2021-08-03 NOTE — PROGRESS NOTES
Telemetry Shift Summary    Rhythm SR  HR Range 61-67  Ectopy rPAC  Measurements 0.16/0.08/0.40        Normal Values  Rhythm SR  HR Range    Measurements 0.12-0.20 / 0.06-0.10  / 0.30-0.52

## 2021-08-03 NOTE — ANESTHESIA PREPROCEDURE EVALUATION
H/o white coat HTN, hyponatremia and impaired fasting BG. Has had nausea and fatigue x 1 week. Reports she was able to care for herself at home prior to that. Nystagmus reported in H&P but not noted on exam.    Pt is DNR/DNI but would like to suspend this in the perioperative period.    Relevant Problems   NEURO   (positive) New daily persistent headache       Physical Exam    Airway   Mallampati: II  TM distance: >3 FB       Cardiovascular - normal exam  Rhythm: regular  Rate: normal     Dental - normal exam           Pulmonary - normal exam  Breath sounds clear to auscultation     Abdominal    Neurological - normal exam                 Anesthesia Plan    ASA 2       Plan - general       Airway plan will be natural airway        Plan Factors:   Patient did not smoke on day of procedure.      Induction: intravenous      Pertinent diagnostic labs and testing reviewed    Informed Consent:    Anesthetic plan and risks discussed with patient.

## 2021-08-03 NOTE — FACE TO FACE
Face to Face Supporting Documentation - Home Health    The encounter with this patient was in whole or in part the primary reason for home health admission.    Date of encounter:   Patient:                    MRN:                       YOB: 2019  Karla Prince  [id number]  1945     Home health to see patient for:  Physical Therapy evaluation and treatment    Skilled need for:  Exacerbation of Chronic Disease State nausea/vomiting, poor appetite    Skilled nursing interventions to include:  Comment: N/A    Homebound status evidenced by:  Need the aid of supportive devices such as crutches, canes, wheelchairs or walkers or Needs the assistance of another person in order to leave the home. Leaving home requires a considerable and taxing effort. There is a normal inability to leave the home.    Community Physician to provide follow up care: Yas Phelps M.D.     Optional Interventions? No      I certify the face to face encounter for this home health care referral meets the CMS requirements and the encounter/clinical assessment with the patient was, in whole, or in part, for the medical condition(s) listed above, which is the primary reason for home health care. Based on my clinical findings: the service(s) are medically necessary, support the need for home health care, and the homebound criteria are met.  I certify that this patient has had a face to face encounter by myself.  Kathleen Inman D.O. - NPI: [id number]

## 2021-08-03 NOTE — FACE TO FACE
Face to Face Supporting Documentation - Home Health    The encounter with this patient was in whole or in part the primary reason for home health admission.    Date of encounter:   Patient:                    MRN:                       YOB: 2019  Karla Prinec  [id number]  1945     Home health to see patient for:  Physical Therapy evaluation and treatment and Occupational therapy evaluation and treatment    Skilled need for:  Recent Deterioration of Health Status nausea/vomiting, poor appetite, headaches, fevers    Skilled nursing interventions to include:  Comment: n/a    Homebound status evidenced by:  Need the aid of supportive devices such as crutches, canes, wheelchairs or walkers or Needs the assistance of another person in order to leave the home. Leaving home requires a considerable and taxing effort. There is a normal inability to leave the home.    Community Physician to provide follow up care: Yas Phelps M.D.     Optional Interventions? No      I certify the face to face encounter for this home health care referral meets the CMS requirements and the encounter/clinical assessment with the patient was, in whole, or in part, for the medical condition(s) listed above, which is the primary reason for home health care. Based on my clinical findings: the service(s) are medically necessary, support the need for home health care, and the homebound criteria are met.  I certify that this patient has had a face to face encounter by myself.  Machelle Erwin M.D. - NPI: [id number]

## 2021-08-03 NOTE — PROGRESS NOTES
Report given to Michi CHÁVEZ. Plan of care discussed. Patient resting comfortably in bed. Safety precautions in place.

## 2021-08-03 NOTE — PROGRESS NOTES
Telemetry Shift Summary    Rhythm SB-SR  HR Range 59-69  Ectopy none  Measurements .14/.10/.44        Normal Values  Rhythm SR  HR Range    Measurements 0.12-0.20 / 0.06-0.10  / 0.30-0.52

## 2021-08-03 NOTE — DISCHARGE PLANNING
Anticipated Discharge Disposition: Home with HH    Action: LSW completed chart review. Per chart review, PT is recommending HH. Per chart review, order and face to face are placed.    LSW met with pt at bedside to collect HH choice. LSW explained LSW role in d/c planning and HH referral process. Pt signed consent to send referral to [facility]. LSW faxed choice form to [facility].    Pt confirmed physical address: [address] [place] [address] [place] [address]    Barriers to Discharge: None    Plan: LSW to follow up with HH referral    1235: LSW received call from Shamika with [facility]. Per Shamika, they do not have the occupation therapy staff at this time. Shamika requested the MD change the order to only physical therapy. Per Shamika, they can go through pt's PCP to add occupational therapy if needed. LSW requested MD place a new order and face to face with only physical therapy. LSW to notify Shamika when the new order is placed.    1243: LSW notified by MD that new order and face to face are in. LSW called Shamika to inform her that the orders are updated. Per Shamika, she will take a look a them shortly.

## 2021-08-03 NOTE — ANESTHESIA TIME REPORT
Anesthesia Start and Stop Event Times     Date Time Event    8/3/2021 1515 Ready for Procedure     1536 Anesthesia Start     1557 Anesthesia Stop        Responsible Staff  08/03/21    Name Role Begin End    Jami Chirinos M.D. Anesth 1536 1557        Preop Diagnosis (Free Text):  Pre-op Diagnosis     abdominal pain        Preop Diagnosis (Codes):  Diagnosis Information     Diagnosis Code(s): Hiatal hernia [K44.9]     Gastric erosions [K25.9]        Post op Diagnosis  Nausea      Premium Reason  A. 3PM - 7AM    Comments:

## 2021-08-03 NOTE — CARE PLAN
The patient is Stable - Low risk of patient condition declining or worsening    Shift Goals  Clinical Goals: balance electrolyes, increase nutrition  Patient Goals: eat more, avoid feeding tube  Family Goals: education    Progress made toward(s) clinical / shift goals:  Pt received salt tablet this morning, 2x more this shift. Pt educated on electrolyte imbalance and importance of food and fluid intake after NPO status is lifted for the EGD.    Patient is not progressing towards the following goals: Pt has not had a BM since 8/1 and reports constipation discomfort.     Problem: Bowel Elimination  Goal: Establish and maintain regular bowel function  Outcome: Not Progressing  Pt has not had bowel movement since 8/1 assisted by enema. Unable to maintain regular bowel function at this time.     Problem: Pain - Standard  Goal: Alleviation of pain or a reduction in pain to the patient’s comfort goal  Outcome: Progressing     Problem: Fall Risk  Goal: Patient will remain free from falls  Outcome: Progressing     Problem: Skin Integrity  Goal: Skin integrity is maintained or improved  Outcome: Progressing

## 2021-08-03 NOTE — DISCHARGE PLANNING
Received Choice form at 8432  Agency/Facility Name: [facility]  Referral sent per Choice form @ 8464

## 2021-08-03 NOTE — DISCHARGE PLANNING
Good morning,  This referral has been escalated to a Clinical Supervisor for review in order to determine Home Health appropriateness.  This issue will be resolved as quickly as possible, but for any questions feel free to call us at [phone].  Thank you!

## 2021-08-03 NOTE — PROGRESS NOTES
Telemetry Shift Summary     Rhythm: SR  Rate: 57-64  Measurements: 0.12/0.10/0.38  Ectopy (reported by Monitor Tech): N/A     Normal Values  Rhythm: Sinus  HR:   Measurements: 0.12-0.20/0.06-0.10/0.30-0.52

## 2021-08-03 NOTE — DIETARY
Nutrition Services: PO intake improved yesterday to 25-50% at lunch and dinner. Pt liked the cottage cheese, fruit, and crackers. Pt is currently NPO for EGD today. RD will continue to follow.

## 2021-08-03 NOTE — OR NURSING
Pt allergies and NPO status verified. Belongings verified. Pt verbalizes understanding of the pain scale, expected course of stay, and plan of care.  Surgical site verified with pt.  IV access assessed, patent.

## 2021-08-03 NOTE — PROGRESS NOTES
Updated daughter on POC and pt current condition. All questions answered within scope of practice.

## 2021-08-03 NOTE — CARE PLAN
The patient is Stable - Low risk of patient condition declining or worsening    Shift Goals  Clinical Goals: Balance electrolytes  Patient Goals: Eat 25% - 50% of meals   Family Goals: Receive education on plan of care.    Progress made toward(s) clinical / shift goals:  Patient is being treated with IV fluids to help correct electrolyte imbalances. Patient still needs to improve dietary intake.     Patient is not progressing towards the following goals:      Problem: Pain - Standard  Goal: Alleviation of pain or a reduction in pain to the patient’s comfort goal  Outcome: Progressing   Patients pain has been relieved with the implementation of pain management ordered.     Problem: Skin Integrity  Goal: Skin integrity is maintained or improved  Outcome: Progressing   Patient has no new signs of skin breakdown.

## 2021-08-03 NOTE — OR NURSING
1552:To PACU from EUS via gurney, drowsy but rouses to deny pain/nausea, respirations spontaneous and non-labored   1600: More awake, O2 d/suze  1614: s/b Dr Kraus  1615: Meets criteria to transfer to floor.

## 2021-08-04 PROBLEM — K29.60 EROSIVE GASTRITIS: Status: ACTIVE | Noted: 2021-08-04

## 2021-08-04 LAB
ALBUMIN SERPL BCP-MCNC: 3.6 G/DL (ref 3.2–4.9)
BUN SERPL-MCNC: 6 MG/DL (ref 8–22)
CALCIUM SERPL-MCNC: 8.3 MG/DL (ref 8.4–10.2)
CHLORIDE SERPL-SCNC: 87 MMOL/L (ref 96–112)
CO2 SERPL-SCNC: 23 MMOL/L (ref 20–33)
CREAT SERPL-MCNC: 0.45 MG/DL (ref 0.5–1.4)
ERYTHROCYTE [DISTWIDTH] IN BLOOD BY AUTOMATED COUNT: 37.4 FL (ref 35.9–50)
GLUCOSE SERPL-MCNC: 104 MG/DL (ref 65–99)
HCT VFR BLD AUTO: 36 % (ref 37–47)
HGB BLD-MCNC: 13.3 G/DL (ref 12–16)
MCH RBC QN AUTO: 33.8 PG (ref 27–33)
MCHC RBC AUTO-ENTMCNC: 36.9 G/DL (ref 33.6–35)
MCV RBC AUTO: 91.4 FL (ref 81.4–97.8)
PHOSPHATE SERPL-MCNC: 2.2 MG/DL (ref 2.5–4.5)
PLATELET # BLD AUTO: 219 K/UL (ref 164–446)
PMV BLD AUTO: 10.9 FL (ref 9–12.9)
POTASSIUM SERPL-SCNC: 3.4 MMOL/L (ref 3.6–5.5)
RBC # BLD AUTO: 3.94 M/UL (ref 4.2–5.4)
SODIUM SERPL-SCNC: 122 MMOL/L (ref 135–145)
WBC # BLD AUTO: 9.9 K/UL (ref 4.8–10.8)

## 2021-08-04 PROCEDURE — 700102 HCHG RX REV CODE 250 W/ 637 OVERRIDE(OP): Performed by: INTERNAL MEDICINE

## 2021-08-04 PROCEDURE — A9270 NON-COVERED ITEM OR SERVICE: HCPCS | Performed by: INTERNAL MEDICINE

## 2021-08-04 PROCEDURE — 80069 RENAL FUNCTION PANEL: CPT

## 2021-08-04 PROCEDURE — 94760 N-INVAS EAR/PLS OXIMETRY 1: CPT

## 2021-08-04 PROCEDURE — 700111 HCHG RX REV CODE 636 W/ 250 OVERRIDE (IP): Performed by: INTERNAL MEDICINE

## 2021-08-04 PROCEDURE — 700105 HCHG RX REV CODE 258: Performed by: INTERNAL MEDICINE

## 2021-08-04 PROCEDURE — 700101 HCHG RX REV CODE 250: Performed by: INTERNAL MEDICINE

## 2021-08-04 PROCEDURE — 85027 COMPLETE CBC AUTOMATED: CPT

## 2021-08-04 PROCEDURE — 770006 HCHG ROOM/CARE - MED/SURG/GYN SEMI*

## 2021-08-04 PROCEDURE — 99232 SBSQ HOSP IP/OBS MODERATE 35: CPT | Performed by: INTERNAL MEDICINE

## 2021-08-04 RX ORDER — LACTULOSE 20 G/30ML
30 SOLUTION ORAL 3 TIMES DAILY PRN
Status: DISCONTINUED | OUTPATIENT
Start: 2021-08-04 | End: 2021-08-06 | Stop reason: HOSPADM

## 2021-08-04 RX ORDER — DRONABINOL 2.5 MG/1
2.5 CAPSULE ORAL
Status: DISCONTINUED | OUTPATIENT
Start: 2021-08-04 | End: 2021-08-06 | Stop reason: HOSPADM

## 2021-08-04 RX ADMIN — OMEPRAZOLE 20 MG: 20 CAPSULE, DELAYED RELEASE ORAL at 05:33

## 2021-08-04 RX ADMIN — SODIUM CHLORIDE 1 G: 1 TABLET ORAL at 10:39

## 2021-08-04 RX ADMIN — SODIUM CHLORIDE 1 G: 1 TABLET ORAL at 17:19

## 2021-08-04 RX ADMIN — DRONABINOL 2.5 MG: 2.5 CAPSULE ORAL at 10:39

## 2021-08-04 RX ADMIN — ONDANSETRON 4 MG: 2 INJECTION INTRAMUSCULAR; INTRAVENOUS at 16:40

## 2021-08-04 RX ADMIN — LACTULOSE 30 ML: 20 SOLUTION ORAL at 15:12

## 2021-08-04 RX ADMIN — SODIUM CHLORIDE 1 G: 1 TABLET ORAL at 07:55

## 2021-08-04 RX ADMIN — ATORVASTATIN CALCIUM 40 MG: 40 TABLET, FILM COATED ORAL at 17:18

## 2021-08-04 RX ADMIN — POTASSIUM PHOSPHATE, MONOBASIC AND POTASSIUM PHOSPHATE, DIBASIC 30 MMOL: 224; 236 INJECTION, SOLUTION, CONCENTRATE INTRAVENOUS at 08:42

## 2021-08-04 RX ADMIN — OMEPRAZOLE 20 MG: 20 CAPSULE, DELAYED RELEASE ORAL at 17:19

## 2021-08-04 RX ADMIN — DRONABINOL 2.5 MG: 2.5 CAPSULE ORAL at 17:18

## 2021-08-04 RX ADMIN — POLYETHYLENE GLYCOL 3350 1 PACKET: 17 POWDER, FOR SOLUTION ORAL at 05:33

## 2021-08-04 RX ADMIN — POTASSIUM CHLORIDE: 2 INJECTION, SOLUTION, CONCENTRATE INTRAVENOUS at 02:12

## 2021-08-04 RX ADMIN — DOCUSATE SODIUM 50 MG AND SENNOSIDES 8.6 MG 2 TABLET: 8.6; 5 TABLET, FILM COATED ORAL at 05:33

## 2021-08-04 ASSESSMENT — ENCOUNTER SYMPTOMS
COUGH: 0
DIZZINESS: 0
BLURRED VISION: 0
NAUSEA: 1
SORE THROAT: 0
SENSORY CHANGE: 0
SHORTNESS OF BREATH: 0
CHILLS: 0
FEVER: 0
DIARRHEA: 0
HEADACHES: 1
ABDOMINAL PAIN: 0
VOMITING: 0
FALLS: 0
FOCAL WEAKNESS: 0
CONSTIPATION: 1

## 2021-08-04 NOTE — PROGRESS NOTES
Received report and assumed care of pt. Pt resting in bed at this time. Safety measures in place.     Pts  at bedside, discussed dietary options for the day, pt expressed desire for shower and linen change today as well. All other needs being met at this time.

## 2021-08-04 NOTE — CARE PLAN
Problem: Nutritional:  Goal: Achieve adequate nutritional intake  Description: Patient will consume >/=50% of meals and/or supplements  8/4/2021 1155 by Celina San R.D.  Outcome: Not Progressing     Documented PO on 8/2 improved to 25-50% at lunch and dinner. RD had encouraged PO of at least 25-50% when RD visited pt on 8/2. Pt was NPO yesterday for EGD. PO this morning was poor again at < 25%. MD ordered Marinol as appetite stimulant and this was given today before lunch. Pt requested grilled cheese, fruit and chips for lunch. RD will continue to follow.

## 2021-08-04 NOTE — PROGRESS NOTES
ID Brief Note     Patient has been afebrile for ~48 hours.  No leukocytosis.  EGD with finding multiple linear erosions within the stomach.  Biopsies were obtained to rule out H. Pylori.      --- Ceftriaxone has been stopped, adequate treatment for the group B UTI  --- Blood cultures remain negative  --- Plan is for follow-up with GI    ID will sign off.    Malorie Cai MD

## 2021-08-04 NOTE — CARE PLAN
The patient is Stable - Low risk of patient condition declining or worsening    Shift Goals  Clinical Goals: advance diet, increase nutrition  Patient Goals: eat more, avoid feeding tube  Family Goals: education    Progress made toward(s) clinical / shift goals:  advanced diet, encouraged PO intake.     Problem: Hemodynamic Monitoring  Goal: Patient's hemodynamics, fluid balance and neurologic status will be stable or improve  Outcome: Progressing     Problem: Urinary Elimination  Goal: Establish and maintain regular urinary output  Outcome: Progressing     Problem: Bowel Elimination  Goal: Establish and maintain regular bowel function  Outcome: Progressing     Problem: Self Care  Goal: Patient will have the ability to perform ADLs independently or with assistance (bathe, groom, dress, toilet and feed)  Outcome: Progressing     Problem: Knowledge Deficit - Standard  Goal: Patient and family/care givers will demonstrate understanding of plan of care, disease process/condition, diagnostic tests and medications  Outcome: Progressing     Problem: Pain - Standard  Goal: Alleviation of pain or a reduction in pain to the patient’s comfort goal  Outcome: Progressing     Problem: Fall Risk  Goal: Patient will remain free from falls  Outcome: Progressing     Problem: Skin Integrity  Goal: Skin integrity is maintained or improved  Outcome: Progressing       Patient is not progressing towards the following goals:

## 2021-08-04 NOTE — PROGRESS NOTES
Report given to Trae RN via bedside report. Patient handed off in stable condition. Safety measures in place. Call light within reach. Care relinquished.

## 2021-08-04 NOTE — PROGRESS NOTES
"Hospital Medicine Daily Progress Note    Date of Service  8/4/2021    Chief Complaint  Karla Prince is a 75 y.o. female admitted 7/30/2021 with vertigo, headache, nausea/vomiting    Hospital Course  Karla Prince is a 75 y.o. female with history of osteoporosis, dyslipidemia, tobacco use who presented 7/30/2021 with vertigo, nausea/vomiting.     Patient was in her usual state of health until wednesday 7/28 in the am, got out of bed and developed acute dizziness and n/v.  She has since been having dry heaves, \"bile\" for the last few days.  Patient also reports she has developed new headache last 4 days (not normally headache person). \"skin hurts\", and reports chills but no fever.  Has had vertigo in the past if she's had the \"flu\" or \"cant eat\".  Patient also reports intermittent abdominal pain, LUQ, none now.  Her  reports he has noticed a gradual loss of appetite, lost her taste, after she had her second covid vaccine, tongue was numb, now better, can taste.  He also reports she has been sleeping a lot, all day long which is very abnormal for patient.  Does report she's had burning during urination. Smokes 4 cigarettes.       In the ED she was found to be afebrile, normal blood pressure and heart rate.  Labs remarkable for sodium 128.  Chest x-ray negative.'s abdominal x-ray negative.  CT head negative for acute intracranial findings does have white matter lucency most consistent with chronic small vessel ischemic change. MRI brain negative for stroke.  She spiked a fever overnight on day of admission to 102F.  Blood cultures obtained.  Urine grew GBS.  She was started on CTX.  She had persistent nausea/vomiting so CT AP obtained which showed very distended bladder and constipation.  Lantigua was placed and bowel regimen increased.  Gastroenterology was consulted for ongoing nausea.    Her sodium level decreased despite IVF. Salt tabs started 8/1 as well as fluid restriction.  On 8/2 she began having fevers " again and infectious disease was consulted.      Interval Problem Update  Afebrile for 2 days. MRCP showed no biliary obstruction.  Status post EGD yesterday found erosive gastritis and recommended PPI twice daily and to treat H. pylori if comes back positive.  Patient complaining of constipation and mild headache today, states pain 4/10.  Patient still has low appetite, will start Marinol to help with appetite stimulation.    I have personally seen and examined the patient at bedside. I discussed the plan of care with patient, family, bedside RN and .    Consultants/Specialty  infectious disease     Code Status  DNAR/DNI    Disposition  Patient is not medically cleared.   Anticipate discharge to to home with close outpatient follow-up.  I have placed the appropriate orders for post-discharge needs.    Review of Systems  Review of Systems   Constitutional: Positive for malaise/fatigue. Negative for chills and fever.   HENT: Negative for congestion and sore throat.    Eyes: Negative for blurred vision.   Respiratory: Negative for cough and shortness of breath.    Cardiovascular: Negative for chest pain and leg swelling.   Gastrointestinal: Positive for constipation and nausea. Negative for abdominal pain, diarrhea and vomiting.   Genitourinary: Negative for dysuria.        Difficulty urinating, now with frankel   Musculoskeletal: Negative for falls.   Skin: Negative for rash.   Neurological: Positive for headaches. Negative for dizziness, sensory change and focal weakness.        Physical Exam  Temp:  [36.4 °C (97.5 °F)-37.5 °C (99.5 °F)] 37.1 °C (98.8 °F)  Pulse:  [58-66] 60  Resp:  [12-18] 18  BP: (121-162)/(52-73) 135/70  SpO2:  [91 %-99 %] 94 %    Physical Exam  Vitals and nursing note reviewed.   Constitutional:       General: She is not in acute distress.     Appearance: She is ill-appearing.   HENT:      Head: Normocephalic and atraumatic.   Eyes:      Conjunctiva/sclera: Conjunctivae normal.    Cardiovascular:      Rate and Rhythm: Normal rate and regular rhythm.      Heart sounds: No murmur heard.     Pulmonary:      Effort: Pulmonary effort is normal. No respiratory distress.      Breath sounds: Normal breath sounds.   Abdominal:      General: Abdomen is flat. There is no distension.      Palpations: Abdomen is soft.      Tenderness: There is abdominal tenderness (mild).   Genitourinary:     Comments: Lantigua in place  Musculoskeletal:      Cervical back: Neck supple.      Right lower leg: No edema.      Left lower leg: No edema.   Skin:     General: Skin is warm.      Coloration: Skin is not jaundiced.      Findings: No rash.   Neurological:      Mental Status: She is alert and oriented to person, place, and time.   Psychiatric:         Mood and Affect: Mood normal.         Behavior: Behavior normal.         Fluids    Intake/Output Summary (Last 24 hours) at 8/4/2021 1053  Last data filed at 8/4/2021 0800  Gross per 24 hour   Intake 4731.25 ml   Output 4120 ml   Net 611.25 ml       Laboratory  Recent Labs     08/02/21  0023 08/03/21  0832 08/04/21  0413   WBC 8.0 8.6 9.9   RBC 3.71* 4.04* 3.94*   HEMOGLOBIN 12.4 13.4 13.3   HEMATOCRIT 33.9* 38.7 36.0*   MCV 91.4 95.8 91.4   MCH 33.4* 33.2* 33.8*   MCHC 36.6* 34.6 36.9*   RDW 38.0 39.7 37.4   PLATELETCT 198 183 219   MPV 10.5 10.9 10.9     Recent Labs     08/02/21  0023 08/02/21  0023 08/02/21  0918 08/03/21  0832 08/04/21  0413   SODIUM 125*   < > 126* 127* 122*   POTASSIUM 3.6  --   --  4.2 3.4*   CHLORIDE 92*  --   --  96 87*   CO2 22  --   --  21 23   GLUCOSE 126*  --   --  110* 104*   BUN 8  --   --  6* 6*   CREATININE 0.58  --   --  0.44* 0.45*   CALCIUM 8.0*  --   --  8.4 8.3*    < > = values in this interval not displayed.                   Imaging  [id number]-D/O   Final Result      1.  No biliary obstruction identified.      2.  Benign left adrenal adenoma               EC-ECHOCARDIOGRAM COMPLETE W/O CONT   Final Result      CT-ABDOMEN-PELVIS  WITH   Final Result      1.  Marked distention of the urinary bladder. No bladder wall thickening.      2.  No hydronephrosis.      3.  Minimal intrahepatic and extra hepatic biliary dilatation. No cholelithiasis.      4.  Increased volume of stool throughout the colon which may represent constipation.      MR-BRAIN-W/O   Final Result      1.  No acute abnormality.   2.  Mild cerebral volume loss.   3.  Mild chronic microvascular ischemic disease.      DX-CHEST-PORTABLE (1 VIEW)   Final Result         1. No acute cardiopulmonary abnormalities are identified.      [id number] VIEW   Final Result         No specific finding to suggest small bowel obstruction.      CT-HEAD W/O   Final Result         1. No acute intracranial findings. No evidence of acute intracranial hemorrhage or mass lesion.      2. White matter lucencies most consistent with chronic small vessel ischemic change.                    Assessment/Plan  * Intractable vomiting with nausea  Assessment & Plan  Vomiting improved but still with nausea and poor po intake  Unsure etiology, could be related to possible UTI vs constipation vs gastritis/PUD vs malignancy? Getting MRCP today although nothing on CT AP  MRI brain negative (noncon), CT AP showed urinary retention and constipation  Nausea meds  H. Pylori, started PPI   IVF  Dietician consult  If no improvement in next 1-2 days will likely need cortrak placed for enteral nutrition as it's been ~5-6 days of very poor po intake  GI consulted, plan for MRCP and EGD today    Erosive gastritis  Assessment & Plan  Seen on EGD   PPI BID   F/u h. Pylori results   F/u with GI in the outpatient office    Fever  Assessment & Plan  Fevers have returned, unknown etiology, malignancy? Encephalitis/meningitis? ucx growing GBS but been on CTX > 48 hours, bcx negative, echo negative, only symptoms are headache and nausea/vomiting, may need LP  - consulted ID, ok to monitor off abx since finished ceftriaxone   - afebrile  48 hrs     New daily persistent headache  Assessment & Plan  Unsure etiology, CT head negative, MRI brain without negative for acute findings  No other neuro sx's currently  ID consulted, possible LP   Pain mgmt    Urinary retention  Assessment & Plan  Patient developed urinary retention, UTI +/- meclizine?  Lantigua placed 8/1  CTX for UTI    Acute cystitis without hematuria  Assessment & Plan  Patient spiked fever overnight, will check labs, get bcx and start empiric CTX (source likely urine since did have some dysuria a few days ago, now improved though and UA not that impressive and had a lot of epithelial cells).  Did not have any elevated WBC yesterday.  No other SIRS criteria yesterday or overnight except temp.  Inflammatory markers and procal negative.   - Ucx growing GBS  - bcx NGTD  - IVF  - finished 3 day course of ceftriaxone   - discussed with ID ok to monitor off abx for now given fevers have resolved     Hyponatremia  Assessment & Plan  Thought likely related to decreased PO intake however worsening with IVF, SIADH? Labs mixed picture: high urine sodium but low urine osm, low serum osm, getting better with salt tabs and fluid restriction  IVF decreased, salt tabs 8/1  CTM    Na 122, stop IVF, continue fluid restriction     Vertigo  Assessment & Plan  Improving  Patient presents with acute vertigo, n/v, headache and severe fatigue however 4 days ago, nystagmus but otherwise normal neuro exam, no TPA, no permissive HTN  Diff dx: stroke vs BPPV --> dehdyration vs UTI -->dehdyration  CT head negative  MRI brain negative for acute findings  PT/OT- home health   Lipid panel elevated             Dyslipidemia- (present on admission)  Assessment & Plan  Elevated cholesterol  Started statin       VTE prophylaxis: enoxaparin ppx    I have performed a physical exam and reviewed and updated ROS and Plan today (8/4/2021). In review of yesterday's note (8/3/2021), there are no changes except as documented above.

## 2021-08-04 NOTE — DISCHARGE PLANNING
Anticipated Discharge Disposition:   Home with [facility]      Action:   Chart review complete.     Discussed patient's plan of care and plans for discharge during rounds. Per MD, patient had and EGD that showed gastritis. MD anticipates patient to discharge in 1-2 days with HH.     Patient has been accepted to [facility] . RN CM to call them upon discharge.     Barriers to Discharge:   Medical Clearance    Plan:   Hospital care management will continue to assist with discharge planning needs.

## 2021-08-05 PROBLEM — R50.9 FEVER: Status: RESOLVED | Noted: 2021-08-02 | Resolved: 2021-08-05

## 2021-08-05 LAB
ALBUMIN SERPL BCP-MCNC: 3.3 G/DL (ref 3.2–4.9)
BACTERIA BLD CULT: NORMAL
BACTERIA BLD CULT: NORMAL
BUN SERPL-MCNC: 8 MG/DL (ref 8–22)
CALCIUM SERPL-MCNC: 8.4 MG/DL (ref 8.4–10.2)
CHLORIDE SERPL-SCNC: 91 MMOL/L (ref 96–112)
CO2 SERPL-SCNC: 21 MMOL/L (ref 20–33)
CREAT SERPL-MCNC: 0.48 MG/DL (ref 0.5–1.4)
GLUCOSE SERPL-MCNC: 95 MG/DL (ref 65–99)
PHOSPHATE SERPL-MCNC: 3.1 MG/DL (ref 2.5–4.5)
POTASSIUM SERPL-SCNC: 3.4 MMOL/L (ref 3.6–5.5)
SIGNIFICANT IND 70042: NORMAL
SIGNIFICANT IND 70042: NORMAL
SITE SITE: NORMAL
SITE SITE: NORMAL
SODIUM SERPL-SCNC: 124 MMOL/L (ref 135–145)
SOURCE SOURCE: NORMAL
SOURCE SOURCE: NORMAL

## 2021-08-05 PROCEDURE — A9270 NON-COVERED ITEM OR SERVICE: HCPCS | Performed by: INTERNAL MEDICINE

## 2021-08-05 PROCEDURE — 770006 HCHG ROOM/CARE - MED/SURG/GYN SEMI*

## 2021-08-05 PROCEDURE — 97535 SELF CARE MNGMENT TRAINING: CPT

## 2021-08-05 PROCEDURE — 700111 HCHG RX REV CODE 636 W/ 250 OVERRIDE (IP): Performed by: INTERNAL MEDICINE

## 2021-08-05 PROCEDURE — 97530 THERAPEUTIC ACTIVITIES: CPT

## 2021-08-05 PROCEDURE — 94760 N-INVAS EAR/PLS OXIMETRY 1: CPT

## 2021-08-05 PROCEDURE — 700102 HCHG RX REV CODE 250 W/ 637 OVERRIDE(OP): Performed by: INTERNAL MEDICINE

## 2021-08-05 PROCEDURE — 700101 HCHG RX REV CODE 250: Performed by: INTERNAL MEDICINE

## 2021-08-05 PROCEDURE — 99232 SBSQ HOSP IP/OBS MODERATE 35: CPT | Performed by: INTERNAL MEDICINE

## 2021-08-05 PROCEDURE — 80069 RENAL FUNCTION PANEL: CPT

## 2021-08-05 PROCEDURE — 97116 GAIT TRAINING THERAPY: CPT

## 2021-08-05 RX ORDER — ENEMA 19; 7 G/133ML; G/133ML
1 ENEMA RECTAL ONCE
Status: COMPLETED | OUTPATIENT
Start: 2021-08-05 | End: 2021-08-05

## 2021-08-05 RX ORDER — POTASSIUM CHLORIDE 20 MEQ/1
40 TABLET, EXTENDED RELEASE ORAL ONCE
Status: COMPLETED | OUTPATIENT
Start: 2021-08-05 | End: 2021-08-05

## 2021-08-05 RX ORDER — MAGNESIUM SULFATE 1 G/100ML
1 INJECTION INTRAVENOUS ONCE
Status: COMPLETED | OUTPATIENT
Start: 2021-08-05 | End: 2021-08-05

## 2021-08-05 RX ORDER — METHYLPREDNISOLONE SODIUM SUCCINATE 125 MG/2ML
125 INJECTION, POWDER, LYOPHILIZED, FOR SOLUTION INTRAMUSCULAR; INTRAVENOUS ONCE
Status: COMPLETED | OUTPATIENT
Start: 2021-08-05 | End: 2021-08-05

## 2021-08-05 RX ORDER — METOCLOPRAMIDE HYDROCHLORIDE 5 MG/ML
10 INJECTION INTRAMUSCULAR; INTRAVENOUS ONCE
Status: COMPLETED | OUTPATIENT
Start: 2021-08-05 | End: 2021-08-05

## 2021-08-05 RX ADMIN — POLYETHYLENE GLYCOL 3350 1 PACKET: 17 POWDER, FOR SOLUTION ORAL at 05:48

## 2021-08-05 RX ADMIN — MAGNESIUM SULFATE 1 G: 1 INJECTION INTRAVENOUS at 11:21

## 2021-08-05 RX ADMIN — OMEPRAZOLE 20 MG: 20 CAPSULE, DELAYED RELEASE ORAL at 17:20

## 2021-08-05 RX ADMIN — SODIUM PHOSPHATE 133 ML: 7; 19 ENEMA RECTAL at 12:55

## 2021-08-05 RX ADMIN — SODIUM CHLORIDE 1 G: 1 TABLET ORAL at 11:21

## 2021-08-05 RX ADMIN — POTASSIUM CHLORIDE 40 MEQ: 1500 TABLET, EXTENDED RELEASE ORAL at 10:56

## 2021-08-05 RX ADMIN — METOCLOPRAMIDE 10 MG: 5 INJECTION, SOLUTION INTRAMUSCULAR; INTRAVENOUS at 11:21

## 2021-08-05 RX ADMIN — DOCUSATE SODIUM 50 MG AND SENNOSIDES 8.6 MG 2 TABLET: 8.6; 5 TABLET, FILM COATED ORAL at 05:49

## 2021-08-05 RX ADMIN — SODIUM CHLORIDE 1 G: 1 TABLET ORAL at 17:21

## 2021-08-05 RX ADMIN — DRONABINOL 2.5 MG: 2.5 CAPSULE ORAL at 10:56

## 2021-08-05 RX ADMIN — OMEPRAZOLE 20 MG: 20 CAPSULE, DELAYED RELEASE ORAL at 05:49

## 2021-08-05 RX ADMIN — POLYETHYLENE GLYCOL 3350 1 PACKET: 17 POWDER, FOR SOLUTION ORAL at 17:21

## 2021-08-05 RX ADMIN — DOCUSATE SODIUM 50 MG AND SENNOSIDES 8.6 MG 2 TABLET: 8.6; 5 TABLET, FILM COATED ORAL at 17:21

## 2021-08-05 RX ADMIN — METHYLPREDNISOLONE SODIUM SUCCINATE 125 MG: 125 INJECTION, POWDER, FOR SOLUTION INTRAMUSCULAR; INTRAVENOUS at 11:21

## 2021-08-05 RX ADMIN — ATORVASTATIN CALCIUM 40 MG: 40 TABLET, FILM COATED ORAL at 17:21

## 2021-08-05 RX ADMIN — DRONABINOL 2.5 MG: 2.5 CAPSULE ORAL at 17:21

## 2021-08-05 RX ADMIN — SODIUM CHLORIDE 1 G: 1 TABLET ORAL at 08:13

## 2021-08-05 RX ADMIN — OXYCODONE HYDROCHLORIDE 2.5 MG: 5 TABLET ORAL at 11:41

## 2021-08-05 ASSESSMENT — COGNITIVE AND FUNCTIONAL STATUS - GENERAL
DRESSING REGULAR UPPER BODY CLOTHING: A LOT
PERSONAL GROOMING: A LITTLE
TOILETING: A LOT
DAILY ACTIVITIY SCORE: 14
EATING MEALS: A LITTLE
HELP NEEDED FOR BATHING: A LOT
MOBILITY SCORE: 24
SUGGESTED CMS G CODE MODIFIER DAILY ACTIVITY: CK
SUGGESTED CMS G CODE MODIFIER MOBILITY: CH
DRESSING REGULAR LOWER BODY CLOTHING: A LOT

## 2021-08-05 ASSESSMENT — ENCOUNTER SYMPTOMS
FALLS: 0
NAUSEA: 1
HEADACHES: 1
FOCAL WEAKNESS: 0
FEVER: 0
CONSTIPATION: 1
BLURRED VISION: 0
SENSORY CHANGE: 0
VOMITING: 0
ABDOMINAL PAIN: 0
COUGH: 0
DIZZINESS: 0
SORE THROAT: 0
DIARRHEA: 0
SHORTNESS OF BREATH: 0
CHILLS: 0

## 2021-08-05 ASSESSMENT — PAIN DESCRIPTION - PAIN TYPE: TYPE: ACUTE PAIN

## 2021-08-05 ASSESSMENT — GAIT ASSESSMENTS
GAIT LEVEL OF ASSIST: SUPERVISED
DISTANCE (FEET): 150
ASSISTIVE DEVICE: FRONT WHEEL WALKER
DEVIATION: STEP TO

## 2021-08-05 NOTE — CARE PLAN
The patient is Watcher - Medium risk of patient condition declining or worsening    Shift Goals  Clinical Goals: Saftey, increase nutritional intake, BM   Patient Goals: Rest   Family Goals:          Progress made toward(s) clinical / shift goals:    Problem: Fall Risk  Goal: Patient will remain free from falls  Outcome: Progressing           Patient is not progressing towards the following goals:       Problem: Bowel Elimination  Goal: Establish and maintain regular bowel function  8/4/2021 1699 by Gisel Jensen R.N.  Outcome: Not Progressing    Pt has not had a BM since 8/1 after receiving an enema. The goal is to have a BM tonight, if unsuccessful. Pt is to receive another enema. Pts abdomen is semi-firm, per pt she has mild abdomen discomfort.

## 2021-08-05 NOTE — PROGRESS NOTES
Assumed pt care. Pt sleeping, respirations: 16.  Hourly rounding in place. Fall precautions in place and call lights w/in reach.

## 2021-08-05 NOTE — THERAPY
Physical Therapy   Daily Treatment     Patient Name: Karla Prince  Age:  75 y.o., Sex:  female  Medical Record #: [id number]  Today's Date: 8/5/2021     Precautions: Fall Risk    Assessment    Much improved this afternoon Pt says she feels just about normal,she is safe with bed mob,transfers and ambulation    Plan    Continue current treatment plan.    DC Equipment Recommendations: (P) None  Discharge Recommendations: Recommend home health for continued physical therapy services       08/05/21 1400   Total Time Spent   Total Time Spent (Mins) 30   Charge Group   PT Gait Training 1   PT Therapeutic Activities 1   Pain 0 - 10 Group   Pain Rating Scale (NPRS) 0   Therapist Pain Assessment 0   Cognition    Cognition / Consciousness WDL   Balance   Sitting Balance (Static) Good   Sitting Balance (Dynamic) Good   Standing Balance (Static) Fair +   Standing Balance (Dynamic) Fair +   Weight Shift Sitting Good   Weight Shift Standing Good   Gait Analysis   Gait Level Of Assist Supervised   Assistive Device Front Wheel Walker   Distance (Feet) 150   # of Times Distance was Traveled 1   Deviation Step To   # of Stairs Climbed 0   Weight Bearing Status full   Bed Mobility    Supine to Sit Modified Independent   Sit to Supine Modified Independent   Scooting Modified Independent   Functional Mobility   Sit to Stand Supervised   Bed, Chair, Wheelchair Transfer Supervised   Transfer Method Stand Step   How much difficulty does the patient currently have...   Turning over in bed (including adjusting bedclothes, sheets and blankets)? 4   Sitting down on and standing up from a chair with arms (e.g., wheelchair, bedside commode, etc.) 4   Moving from lying on back to sitting on the side of the bed? 4   How much help from another person does the patient currently need...   Moving to and from a bed to a chair (including a wheelchair)? 4   Need to walk in a hospital room? 4   Climbing 3-5 steps with a railing? 4   6 clicks Mobility Score 24    Activity Tolerance   Sitting Edge of Bed 10   Standing 10   Short Term Goals    Short Term Goal # 1 Pt to be I with transfers in 4 V so can go home   Goal Outcome # 1 Progressing as expected   Short Term Goal # 2 Pt to be I with ambulation x 200 feet in 4 V so can go home   Goal Outcome # 2 Progressing as expected   Anticipated Discharge Equipment and Recommendations   DC Equipment Recommendations None   Interdisciplinary Plan of Care Collaboration   IDT Collaboration with  Nursing   Session Information   Date / Session Number  8/5-3  3/4 8/7

## 2021-08-05 NOTE — PROGRESS NOTES
Pt provided with fleet enema. Pt able to have BM after administration of fleet enema. BM documented. Per order, RN removed frankel for voiding trial.

## 2021-08-05 NOTE — DISCHARGE PLANNING
Anticipated Discharge Disposition:   Home with [facility]      Action:   Chart review complete.     Discussed patient's plan of care and plans for discharge during rounds. Per MD, patient's appetite is increasing and she is pending a BM. Anticipated discharge for tomorrow morning. Patient has been accepted by [facility].     RN ALEXIS will continue to follow and assist as needed.     Barriers to Discharge:    Medical Clearance    Plan:   Hospital care management will continue to assist with discharge planning needs.

## 2021-08-05 NOTE — PROGRESS NOTES
Assumed report and patient care from Gisel RN via bedside report. Patient is resting comfortably in bed with no signs of labored breathing or restlessness. POC discussed. No questions or concerns at this time. Safety measures in place, call light within reach.

## 2021-08-05 NOTE — THERAPY
Occupational Therapy  Daily Treatment     Patient Name: Karla Prince  Age:  75 y.o., Sex:  female  Medical Record #: [id number]  Today's Date: 8/5/2021     Precautions: (P) Fall Risk    Assessment    Pt progressing slowly with therapy today. Per pt and spouse report, pt has not been OOB in 2+ days. Pt only tolerated functional mobility to the recliner today (attemted mobility to the bathroom but unable to tolerate d/t nausea, weakness, and pain). Provided extensive education on benefits of mobility and exercises while in bed. Recommend pt up in chair for meals 3x/day with nursing at least. Based on pt's function at this time, recommend home with 24/7 assist (unsure if  is able to provide this assist at home) vs SNF. Will follow while in house, pt may progress with improved nutrition intake and bowel function.     Plan    Continue current treatment plan.    DC Equipment Recommendations: (P) Unable to determine at this time  Discharge Recommendations: (P)  (home with 24/7 assist vs post acute placement)       08/05/21 1105   Precautions   Precautions Fall Risk   Pain 0 - 10 Group   Location Abdomen   Location Orientation Mid;Upper   Pain Rating Scale (NPRS) 8   Description Aching;Constant   Comfort Goal Comfort with Movement;Perform Activity   Therapist Pain Assessment Nurse Notified   Cognition    Comments limited attention d/t pain and nausea   Other Treatments   Other Treatments Provided provided education and training to family on mobility in/out of bed including UB and LB exercises and stretches. Family and pt demonstrated good teach back of techniques   Balance   Sitting Balance (Static) Fair   Sitting Balance (Dynamic) Fair -   Standing Balance (Static) Poor +   Standing Balance (Dynamic) Poor +   Weight Shift Sitting Fair   Weight Shift Standing Fair   Skilled Intervention Verbal Cuing;Tactile Cuing;Sequencing   Comments limited tolerance in standing d/t nausea and pain; able to correct balance at EOB with  verbal cues   Bed Mobility    Supine to Sit Maximal Assist   Sit to Supine   (up in chair post session)   Skilled Intervention Verbal Cuing;Tactile Cuing;Sequencing   Activities of Daily Living   Lower Body Dressing Maximal Assist  (d/t weakness and nausea)   Toileting   (unable to have a BM lately)   How much help from another person does the patient currently need...   Putting on and taking off regular lower body clothing? 2   Bathing (including washing, rinsing, and drying)? 2   Toileting, which includes using a toilet, bedpan, or urinal? 2   Putting on and taking off regular upper body clothing? 2   Taking care of personal grooming such as brushing teeth? 3   Eating meals? 3   6 Clicks Daily Activity Score 14   Functional Mobility   Sit to Stand Minimal Assist   Bed, Chair, Wheelchair Transfer Minimal Assist   Toilet Transfers Unable to Participate   Transfer Method Stand Step   Mobility Bed mob>EOB>STS>txf to chair with FWW   Comments attempted mobility to bathroom but unable to participate d/t nausea. Provided training on incremental mobility to family   Activity Tolerance   Sitting in Chair up in chair post session   Sitting Edge of Bed 15+ min   Standing 1-2 min   Comments limited by nausea and pain   Patient / Family Goals   Patient / Family Goal #1 home   Goal #1 Outcome Goal not met   Short Term Goals   Short Term Goal # 1 pt will perform toileting in br with sup/Mod Indep within 5 days   Goal Outcome # 1 Goal not met   Short Term Goal # 2 pt will perform FB dressing with Sup within 5 days     Goal Outcome # 2 Goal not met   Short Term Goal # 3 pt will tolerate 3/3 meals UIC each day    Goal Outcome # 3 Goal not met   Short Term Goal # 4 Pt will demonstrate BSC txf with min A   Goal Outcome # 4   (new goal added)   Education Group   Education Provided Pathology of bedrest;Transfers;Activities of Daily Living   Role of Occupational Therapist Patient Response Patient;Acceptance;Explanation;Verbal  Demonstration   Transfers Patient Response Patient;Acceptance;Explanation;Action Demonstration;Family   ADL Patient Response Patient;Acceptance;Explanation;Action Demonstration;Family   Pathology of Bedrest Patient Response Patient;Acceptance;Explanation;Family;Verbal Demonstration   Anticipated Discharge Equipment and Recommendations   DC Equipment Recommendations Unable to determine at this time   Discharge Recommendations   (home with 24/7 assist vs post acute placement)   Interdisciplinary Plan of Care Collaboration   IDT Collaboration with  Nursing;Physician;Physical Therapist   Patient Position at End of Therapy Seated;Chair Alarm On;Family / Friend in Room;Phone within Reach;Tray Table within Reach   Collaboration Comments Pt demonstrating decline in function; recommend 24/7 assist at home vs SNF   Session Information   Date / Session Number  8/5 2 (2/3, 8/6)   Priority 3

## 2021-08-05 NOTE — PROGRESS NOTES
"Hospital Medicine Daily Progress Note    Date of Service  8/5/2021    Chief Complaint  Karla Prince is a 75 y.o. female admitted 7/30/2021 with vertigo, headache, nausea/vomiting    Hospital Course  Karla Prince is a 75 y.o. female with history of osteoporosis, dyslipidemia, tobacco use who presented 7/30/2021 with vertigo, nausea/vomiting.     Patient was in her usual state of health until wednesday 7/28 in the am, got out of bed and developed acute dizziness and n/v.  She has since been having dry heaves, \"bile\" for the last few days.  Patient also reports she has developed new headache last 4 days (not normally headache person). \"skin hurts\", and reports chills but no fever.  Has had vertigo in the past if she's had the \"flu\" or \"cant eat\".  Patient also reports intermittent abdominal pain, LUQ, none now.  Her  reports he has noticed a gradual loss of appetite, lost her taste, after she had her second covid vaccine, tongue was numb, now better, can taste.  He also reports she has been sleeping a lot, all day long which is very abnormal for patient.  Does report she's had burning during urination. Smokes 4 cigarettes.       In the ED she was found to be afebrile, normal blood pressure and heart rate.  Labs remarkable for sodium 128.  Chest x-ray negative.'s abdominal x-ray negative.  CT head negative for acute intracranial findings does have white matter lucency most consistent with chronic small vessel ischemic change. MRI brain negative for stroke.  She spiked a fever overnight on day of admission to 102F.  Blood cultures obtained.  Urine grew GBS.  She was started on CTX.  She had persistent nausea/vomiting so CT AP obtained which showed very distended bladder and constipation.  Lantigua was placed and bowel regimen increased.  Gastroenterology was consulted for ongoing nausea. MRCP showed no biliary obstruction.  Status post EGD yesterday found erosive gastritis and recommended PPI twice daily and to treat " H. pylori if comes back positive.     Her sodium level decreased despite IVF. Salt tabs started 8/1 as well as fluid restriction.  On 8/2 she began having fevers again and infectious disease was consulted.      Interval Problem Update  No acute events overnight.  Vital signs stable.  Patient reports no vomiting since yesterday and able to eat a little bit of cereal and melon this morning.  She still complains of constipation and enema has been ordered.  Patient also still complaining of a headache I have ordered Solu-Medrol, magnesium, and Reglan.  Will discontinue Frankel later today after patient has had a bowel movement and monitor for retention.    I have personally seen and examined the patient at bedside. I discussed the plan of care with patient, family, bedside RN and .    Consultants/Specialty  infectious disease     Code Status  DNAR/DNI    Disposition  Patient is not medically cleared.   Anticipate discharge to to home with close outpatient follow-up.  I have placed the appropriate orders for post-discharge needs.    Review of Systems  Review of Systems   Constitutional: Positive for malaise/fatigue. Negative for chills and fever.   HENT: Negative for congestion and sore throat.    Eyes: Negative for blurred vision.   Respiratory: Negative for cough and shortness of breath.    Cardiovascular: Negative for chest pain and leg swelling.   Gastrointestinal: Positive for constipation and nausea. Negative for abdominal pain, diarrhea and vomiting.   Genitourinary: Negative for dysuria.        Difficulty urinating, now with frankel   Musculoskeletal: Negative for falls.   Skin: Negative for rash.   Neurological: Positive for headaches. Negative for dizziness, sensory change and focal weakness.        Physical Exam  Temp:  [36.2 °C (97.1 °F)-37.6 °C (99.6 °F)] 37 °C (98.6 °F)  Pulse:  [56-61] 61  Resp:  [15-17] 17  BP: (111-138)/(61-89) 137/67  SpO2:  [92 %-95 %] 93 %    Physical Exam  Vitals and nursing  note reviewed.   Constitutional:       General: She is not in acute distress.     Appearance: She is ill-appearing.   HENT:      Head: Normocephalic and atraumatic.   Eyes:      Conjunctiva/sclera: Conjunctivae normal.   Cardiovascular:      Rate and Rhythm: Normal rate and regular rhythm.      Heart sounds: No murmur heard.     Pulmonary:      Effort: Pulmonary effort is normal. No respiratory distress.      Breath sounds: Normal breath sounds.   Abdominal:      General: Abdomen is flat. There is no distension.      Palpations: Abdomen is soft.      Tenderness: There is abdominal tenderness (mild).   Genitourinary:     Comments: Lantigua in place  Musculoskeletal:      Cervical back: Neck supple.      Right lower leg: No edema.      Left lower leg: No edema.   Skin:     General: Skin is warm.      Coloration: Skin is not jaundiced.      Findings: No rash.   Neurological:      Mental Status: She is alert and oriented to person, place, and time.   Psychiatric:         Mood and Affect: Mood normal.         Behavior: Behavior normal.         Fluids    Intake/Output Summary (Last 24 hours) at 8/5/2021 0820  Last data filed at 8/5/2021 0542  Gross per 24 hour   Intake 839.8 ml   Output 1075 ml   Net -235.2 ml       Laboratory  Recent Labs     08/03/21  0832 08/04/21  0413   WBC 8.6 9.9   RBC 4.04* 3.94*   HEMOGLOBIN 13.4 13.3   HEMATOCRIT 38.7 36.0*   MCV 95.8 91.4   MCH 33.2* 33.8*   MCHC 34.6 36.9*   RDW 39.7 37.4   PLATELETCT 183 219   MPV 10.9 10.9     Recent Labs     08/03/21  0832 08/04/21  0413 08/05/21  0251   SODIUM 127* 122* 124*   POTASSIUM 4.2 3.4* 3.4*   CHLORIDE 96 87* 91*   CO2 21 23 21   GLUCOSE 110* 104* 95   BUN 6* 6* 8   CREATININE 0.44* 0.45* 0.48*   CALCIUM 8.4 8.3* 8.4                   Imaging  [id number]   Final Result      1.  No biliary obstruction identified.      2.  Benign left adrenal adenoma               EC-ECHOCARDIOGRAM COMPLETE W/O CONT   Final Result      CT-ABDOMEN-PELVIS WITH    Final Result      1.  Marked distention of the urinary bladder. No bladder wall thickening.      2.  No hydronephrosis.      3.  Minimal intrahepatic and extra hepatic biliary dilatation. No cholelithiasis.      4.  Increased volume of stool throughout the colon which may represent constipation.      MR-BRAIN-W/O   Final Result      1.  No acute abnormality.   2.  Mild cerebral volume loss.   3.  Mild chronic microvascular ischemic disease.      DX-CHEST-PORTABLE (1 VIEW)   Final Result         1. No acute cardiopulmonary abnormalities are identified.      [id number] VIEW   Final Result         No specific finding to suggest small bowel obstruction.      CT-HEAD W/O   Final Result         1. No acute intracranial findings. No evidence of acute intracranial hemorrhage or mass lesion.      2. White matter lucencies most consistent with chronic small vessel ischemic change.                    Assessment/Plan  * Intractable vomiting with nausea  Assessment & Plan  Vomiting improved but still with nausea and poor po intake  Unsure etiology, could be related to possible UTI vs constipation vs gastritis/PUD vs malignancy? Getting MRCP today although nothing on CT AP  MRI brain negative (noncon), CT AP showed urinary retention and constipation  Nausea meds  H. Pylori, started PPI   IVF  Dietician consult  If no improvement in next 1-2 days will likely need cortrak placed for enteral nutrition as it's been ~5-6 days of very poor po intake  GI consulted, plan for MRCP and EGD today    Erosive gastritis  Assessment & Plan  Seen on EGD   PPI BID   F/u h. Pylori results   F/u with GI in the outpatient office    Fever  Assessment & Plan  Fevers have returned, unknown etiology, malignancy? Encephalitis/meningitis? ucx growing GBS but been on CTX > 48 hours, bcx negative, echo negative, only symptoms are headache and nausea/vomiting, may need LP  - consulted ID, ok to monitor off abx since finished ceftriaxone   - afebrile 48  hrs     New daily persistent headache  Assessment & Plan  Unsure etiology, CT head negative, MRI brain without negative for acute findings  No other neuro sx's currently  ID consulted, possible LP   Pain mgmt    Urinary retention  Assessment & Plan  Patient developed urinary retention, UTI +/- meclizine?  Lantigua placed 8/1  CTX for UTI    Acute cystitis without hematuria  Assessment & Plan  Patient spiked fever overnight, will check labs, get bcx and start empiric CTX (source likely urine since did have some dysuria a few days ago, now improved though and UA not that impressive and had a lot of epithelial cells).  Did not have any elevated WBC yesterday.  No other SIRS criteria yesterday or overnight except temp.  Inflammatory markers and procal negative.   - Ucx growing GBS  - bcx NGTD  - IVF  - finished 3 day course of ceftriaxone   - discussed with ID ok to monitor off abx for now given fevers have resolved     Hyponatremia  Assessment & Plan  Thought likely related to decreased PO intake however worsening with IVF, SIADH? Labs mixed picture: high urine sodium but low urine osm, low serum osm, getting better with salt tabs and fluid restriction  IVF decreased, salt tabs 8/1  CTM    Na 122, stop IVF, continue fluid restriction     Vertigo  Assessment & Plan  Improving  Patient presents with acute vertigo, n/v, headache and severe fatigue however 4 days ago, nystagmus but otherwise normal neuro exam, no TPA, no permissive HTN  Diff dx: stroke vs BPPV --> dehdyration vs UTI -->dehdyration  CT head negative  MRI brain negative for acute findings  PT/OT- home health   Lipid panel elevated             Dyslipidemia- (present on admission)  Assessment & Plan  Elevated cholesterol  Started statin       VTE prophylaxis: enoxaparin ppx    I have performed a physical exam and reviewed and updated ROS and Plan today (8/5/2021). In review of yesterday's note (8/4/2021), there are no changes except as documented above.

## 2021-08-05 NOTE — CARE PLAN
The patient is Watcher - Medium risk of patient condition declining or worsening    Shift Goals  Clinical Goals: increase food intake, have a BM, decrease abdominal pain  Patient Goals: decrease abdominal pain, eat more   Family Goals: pt comfort, updates    Progress made toward(s) clinical / shift goals:  Pt has not had bowel movement but does have an increased appetite today. Pt experiencing abdominal discomfort. Family present at bedside for education. Updates given to daughter and .    Patient is not progressing towards the following goals: N/A    Problem: Bowel Elimination  Goal: Establish and maintain regular bowel function  Outcome: Not Progressing  Pt has not had BM since 8/1 assisted with enema     Problem: Urinary Elimination  Goal: Establish and maintain regular urinary output  Outcome: Progressing  Lantigua catheter intact with clear output     Problem: Mobility - Stroke  Goal: Patient's capacity to carry out activities will improve  Outcome: Progressing     Problem: Self Care  Goal: Patient will have the ability to perform ADLs independently or with assistance (bathe, groom, dress, toilet and feed)  Outcome: Progressing     Problem: Knowledge Deficit - Standard  Goal: Patient and family/care givers will demonstrate understanding of plan of care, disease process/condition, diagnostic tests and medications  Outcome: Progressing     Problem: Pain - Standard  Goal: Alleviation of pain or a reduction in pain to the patient’s comfort goal  Outcome: Progressing

## 2021-08-06 VITALS
TEMPERATURE: 97.8 F | WEIGHT: 120.15 LBS | RESPIRATION RATE: 18 BRPM | DIASTOLIC BLOOD PRESSURE: 51 MMHG | BODY MASS INDEX: 22.11 KG/M2 | HEIGHT: 62 IN | SYSTOLIC BLOOD PRESSURE: 153 MMHG | HEART RATE: 61 BPM | OXYGEN SATURATION: 95 %

## 2021-08-06 PROBLEM — R42 VERTIGO: Status: RESOLVED | Noted: 2021-07-30 | Resolved: 2021-08-06

## 2021-08-06 PROBLEM — G44.52 NEW DAILY PERSISTENT HEADACHE: Status: RESOLVED | Noted: 2021-08-02 | Resolved: 2021-08-06

## 2021-08-06 PROBLEM — R33.9 URINARY RETENTION: Status: RESOLVED | Noted: 2021-08-01 | Resolved: 2021-08-06

## 2021-08-06 PROBLEM — N30.00 ACUTE CYSTITIS WITHOUT HEMATURIA: Status: RESOLVED | Noted: 2021-07-31 | Resolved: 2021-08-06

## 2021-08-06 PROBLEM — R11.2 INTRACTABLE VOMITING WITH NAUSEA: Status: RESOLVED | Noted: 2021-07-31 | Resolved: 2021-08-06

## 2021-08-06 LAB
ALBUMIN SERPL BCP-MCNC: 3.5 G/DL (ref 3.2–4.9)
BUN SERPL-MCNC: 11 MG/DL (ref 8–22)
CALCIUM SERPL-MCNC: 8.8 MG/DL (ref 8.4–10.2)
CHLORIDE SERPL-SCNC: 93 MMOL/L (ref 96–112)
CO2 SERPL-SCNC: 23 MMOL/L (ref 20–33)
CREAT SERPL-MCNC: 0.48 MG/DL (ref 0.5–1.4)
GLUCOSE SERPL-MCNC: 107 MG/DL (ref 65–99)
PHOSPHATE SERPL-MCNC: 2.5 MG/DL (ref 2.5–4.5)
POTASSIUM SERPL-SCNC: 4.2 MMOL/L (ref 3.6–5.5)
SODIUM SERPL-SCNC: 125 MMOL/L (ref 135–145)

## 2021-08-06 PROCEDURE — A9270 NON-COVERED ITEM OR SERVICE: HCPCS | Performed by: INTERNAL MEDICINE

## 2021-08-06 PROCEDURE — 99239 HOSP IP/OBS DSCHRG MGMT >30: CPT | Performed by: INTERNAL MEDICINE

## 2021-08-06 PROCEDURE — 80069 RENAL FUNCTION PANEL: CPT

## 2021-08-06 PROCEDURE — 700102 HCHG RX REV CODE 250 W/ 637 OVERRIDE(OP): Performed by: INTERNAL MEDICINE

## 2021-08-06 PROCEDURE — 700111 HCHG RX REV CODE 636 W/ 250 OVERRIDE (IP): Performed by: INTERNAL MEDICINE

## 2021-08-06 RX ORDER — DRONABINOL 2.5 MG/1
2.5 CAPSULE ORAL 2 TIMES DAILY
Qty: 60 CAPSULE | Refills: 0 | Status: SHIPPED | OUTPATIENT
Start: 2021-08-06 | End: 2021-09-05

## 2021-08-06 RX ORDER — OMEPRAZOLE 20 MG/1
20 CAPSULE, DELAYED RELEASE ORAL 2 TIMES DAILY
Qty: 60 CAPSULE | Refills: 0 | Status: SHIPPED | OUTPATIENT
Start: 2021-08-06 | End: 2023-02-09

## 2021-08-06 RX ORDER — LACTULOSE 20 G/30ML
30 SOLUTION ORAL 3 TIMES DAILY PRN
Qty: 450 ML | Refills: 0 | Status: SHIPPED | OUTPATIENT
Start: 2021-08-06 | End: 2021-08-10

## 2021-08-06 RX ORDER — ATORVASTATIN CALCIUM 40 MG/1
40 TABLET, FILM COATED ORAL EVERY EVENING
Qty: 30 TABLET | Refills: 0 | Status: SHIPPED | OUTPATIENT
Start: 2021-08-06 | End: 2023-02-09

## 2021-08-06 RX ORDER — AMOXICILLIN 250 MG
1 CAPSULE ORAL 2 TIMES DAILY
Qty: 60 TABLET | Refills: 0 | Status: SHIPPED | OUTPATIENT
Start: 2021-08-06 | End: 2023-02-09

## 2021-08-06 RX ORDER — SODIUM CHLORIDE 1 G/1
1 TABLET ORAL 2 TIMES DAILY
Qty: 60 TABLET | Refills: 0 | Status: SHIPPED | OUTPATIENT
Start: 2021-08-06 | End: 2023-02-09

## 2021-08-06 RX ADMIN — SODIUM CHLORIDE 1 G: 1 TABLET ORAL at 07:36

## 2021-08-06 RX ADMIN — OMEPRAZOLE 20 MG: 20 CAPSULE, DELAYED RELEASE ORAL at 06:26

## 2021-08-06 RX ADMIN — ENOXAPARIN SODIUM 40 MG: 40 INJECTION SUBCUTANEOUS at 06:26

## 2021-08-06 ASSESSMENT — FIBROSIS 4 INDEX: FIB4 SCORE: 2.75

## 2021-08-06 ASSESSMENT — PAIN DESCRIPTION - PAIN TYPE: TYPE: ACUTE PAIN

## 2021-08-06 NOTE — PROGRESS NOTES
Karla Prince has been discharged from the  Med/tele unit.   Discharge instructions, which include signs and symptoms to monitor patient for, as well as detailed information regarding new prescriptions provided.  All questions and concerns addressed at this time. Encouraged a follow- up appointment to be made with patient's PCP.     Patient leaves unit in wheelchair in no apparent distress.

## 2021-08-06 NOTE — PROGRESS NOTES
Assumed pt care.Pt sleeping, respirations 16.  Hourly rounding in place. Fall precautions in place and call lights w/in reach.

## 2021-08-06 NOTE — DISCHARGE INSTRUCTIONS
Discharge Instructions    Discharged to home by car with relative. Discharged via wheelchair, hospital escort: Yes.  Special equipment needed: Wheelchair    Be sure to schedule a follow-up appointment with your primary care doctor or any specialists as instructed.     Discharge Plan:   Diet Plan: Discussed  Activity Level: Discussed  Confirmed Follow up Appointment: Patient to Call and Schedule Appointment  Confirmed Symptoms Management: Discussed  Medication Reconciliation Updated: Yes    I understand that a diet low in cholesterol, fat, and sodium is recommended for good health. Unless I have been given specific instructions below for another diet, I accept this instruction as my diet prescription.   Other diet: Home diet     · Is patient discharged on Warfarin / Coumadin?   No     Depression / Suicide Risk    As you are discharged from this [facility] facility, it is important to learn how to keep safe from harming yourself.    Recognize the warning signs:  · Abrupt changes in personality, positive or negative- including increase in energy   · Giving away possessions  · Change in eating patterns- significant weight changes-  positive or negative  · Change in sleeping patterns- unable to sleep or sleeping all the time   · Unwillingness or inability to communicate  · Depression  · Unusual sadness, discouragement and loneliness  · Talk of wanting to die  · Neglect of personal appearance   · Rebelliousness- reckless behavior  · Withdrawal from people/activities they love  · Confusion- inability to concentrate     If you or a loved one observes any of these behaviors or has concerns about self-harm, here's what you can do:  · Talk about it- your feelings and reasons for harming yourself  · Remove any means that you might use to hurt yourself (examples: pills, rope, extension cords, firearm)  · Get professional help from the community (Mental Health, Substance Abuse, psychological counseling)  · Do not be alone:Call  your Safe Contact- someone whom you trust who will be there for you.  · Call your local CRISIS HOTLINE 852-3724 or 285-097-0180  · Call your local Children's Mobile Crisis Response Team [place] (812) 520-1745 or www.[employer]  · Call the toll free National Suicide Prevention Hotlines   · National Suicide Prevention Lifeline 870-559-WWVX (6066)  · National Hope Line Network 800-SUICIDE (372-0348)  Atorvastatin; Ezetimibe oral tablets  What is this medicine?  ATORVASTATIN; EZETIMIBE (a TORE va sta tin; ez ET i mibe) blocks the body's ability to absorb and make cholesterol. It is used to lower cholesterol. It is only for patients whose cholesterol level is not controlled by diet.  This medicine may be used for other purposes; ask your health care provider or pharmacist if you have questions.  COMMON BRAND NAME(S): Liptruzet  What should I tell my health care provider before I take this medicine?  They need to know if you have any of these conditions:  · diabetes  · history of stroke  · if you often drink alcohol  · kidney disease  · liver disease  · muscle aches or weakness  · thyroid disease  · an unusual or allergic reaction to atorvastatin, ezetimibe, other medicines, foods, dyes, or preservatives  · pregnant or trying to get pregnant  · breast-feeding  How should I use this medicine?  Take this medicine by mouth with a glass of water. Follow the directions on the prescription label. Do not cut, crush or chew this medicine. You can take it with or without food. If it upsets your stomach, take it with food. Take your medicine at regular intervals. Do not take it more often than directed. Do not stop taking except on your doctor's advice.  Talk to your pediatrician regarding the use of this medicine in children. Special care may be needed.  Overdosage: If you think you have taken too much of this medicine contact a poison control center or emergency room at once.  NOTE: This medicine is only for you. Do  not share this medicine with others.  What if I miss a dose?  If you miss a dose, take it as soon as you can. If it is almost time for your next dose, take only that dose. Do not take double or extra doses.  What may interact with this medicine?  Do not take this medicine with any of the following medications:  · posaconazole  · red yeast rice  · telithromycin  · voriconazole  This medicine may also interact with the following medications:  · alcohol  · antiviral medicines for HIV or AIDS  · boceprevir  · certain antibiotics like erythromycin and clarithromycin  · certain medicines for cholesterol like fenofibrate or gemfibrozil  · cimetidine  · colchicine  · cyclosporine  · digoxin  · female hormones, like estrogens or progestins and birth control pills  · grapefruit juice  · medicines for fungal infections like fluconazole, itraconazole, ketoconazole  · niacin  · rifampin  · spironolactone  · telaprevir  · warfarin  This list may not describe all possible interactions. Give your health care provider a list of all the medicines, herbs, non-prescription drugs, or dietary supplements you use. Also tell them if you smoke, drink alcohol, or use illegal drugs. Some items may interact with your medicine.  What should I watch for while using this medicine?  Visit your doctor or health care professional for regular check-ups. You may need regular tests to make sure your liver is working properly.  Tell your doctor or health care professional right away if you get any unexplained muscle pain, tenderness, or weakness, especially if you also have a fever and tiredness. Your doctor or health care professional may tell you to stop taking this medicine if you develop muscle problems. If your muscle problems do not go away after stopping this medicine, contact your health care professional.  This medicine may increase blood sugar. Ask your healthcare provider if changes in diet or medicines are needed if you have diabetes.  This  drug is only part of a total heart-health program. Your doctor or a dietician can suggest a low-cholesterol and low-fat diet to help. Avoid alcohol and smoking, and keep a proper exercise schedule.  Do not become pregnant while taking this medicine. Women should inform their doctor if they wish to become pregnant or think they might be pregnant. There is a potential for serious side effects to an unborn child. Talk to your health care professional or pharmacist for more information. Do not breast-feed an infant while taking this medicine.  This medicine may cause a decrease in Co-Enzyme Q-10. You should make sure that you get enough Co-Enzyme Q-10 while you are taking this medicine. Discuss the foods you eat and the vitamins you take with your health care professional.  What side effects may I notice from receiving this medicine?  Side effects that you should report to your doctor or health care professional as soon as possible:  · allergic reactions like skin rash, itching or hives, swelling of the face, lips, or tongue  · confusion  · dark urine  · general ill feeling or flu-like symptoms  · light-colored stools  · loss of appetite, nausea  · loss of memory  · muscle pain  · redness, blistering, peeling or loosening of the skin, including inside the mouth  · right upper belly pain  · signs and symptoms of high blood sugar such as being more thirsty or hungry or having to urinate more than normal. You may also feel very tired or have blurry vision.  · trouble passing urine  · unusually weak  · yellowing of the eyes or skin  Side effects that usually do not require medical attention (report to your doctor or health care professional if they continue or are bothersome):  · cough  · diarrhea  · dizziness  · joint pain  This list may not describe all possible side effects. Call your doctor for medical advice about side effects. You may report side effects to FDA at 8-831-BPD-8979.  Where should I keep my  medicine?  Keep out of the reach of children.  Store at room temperature between 15 and 30 degrees C (59 and 86 degrees F). Store in the foil pouch until use. After the foil pouch is opened, protect this medicine from moisture and light. Once a tablet is removed, slide blister card back into case. Store the case in a dry place, and throw away any unused tablets 30 days after the pouch is opened.  NOTE: This sheet is a summary. It may not cover all possible information. If you have questions about this medicine, talk to your doctor, pharmacist, or health care provider.  © 2020 Elsevier/Gold Standard (2019-10-09 11:39:19)    Lactulose oral solution  What is this medicine?  LACTULOSE (LAK tyoo lose) is a laxative derived from lactose. It helps to treat chronic constipation and to treat or prevent hepatic encephalopathy or coma. These are brain disorders that result from liver disease.  This medicine may be used for other purposes; ask your health care provider or pharmacist if you have questions.  COMMON BRAND NAME(S): Acilac, Cephulac, Cholac, Chronulac, Constilac, Constulose, Enulose, Generlac  What should I tell my health care provider before I take this medicine?  They need to know if you have any of these conditions:  · need a galactose-free diet  · scheduled for surgery  · an unusual or allergic reaction to lactulose, other sugars, medicines, foods, dyes or preservatives  · pregnant or trying to get pregnant  · breast-feeding  How should I use this medicine?  Take this medicine mouth. Follow the directions on the prescription label. Shake well before using. Use a specially marked spoon or container to measure your medicine. Ask your pharmacist if you do not have one. Household spoons are not accurate. Take your doses at regular intervals. Do not take your medicine more often than directed.  You may be directed to take this medicine rectally. If so, you must follow specific directions from your doctor or  healthcare professional. Please contact him or her.  Talk to your pediatrician regarding the use of this medicine in children. Special care may be needed.  Overdosage: If you think you have taken too much of this medicine contact a poison control center or emergency room at once.  NOTE: This medicine is only for you. Do not share this medicine with others.  What if I miss a dose?  If you miss a dose, take it as soon as you can. If it is almost time for your next dose, take only that dose. Do not take double or extra doses.  What may interact with this medicine?  · antacids  · neomycin  · other laxatives  This list may not describe all possible interactions. Give your health care provider a list of all the medicines, herbs, non-prescription drugs, or dietary supplements you use. Also tell them if you smoke, drink alcohol, or use illegal drugs. Some items may interact with your medicine.  What should I watch for while using this medicine?  This medicine may not produce any result for 24 to 48 hours. Do not take this medicine for longer than directed by your doctor or health care professional.  Drink plenty of water with each dose of this medicine.  What side effects may I notice from receiving this medicine?  Side effects that you should report to your doctor or health care professional as soon as possible:  · diarrhea  Side effects that usually do not require medical attention (report to your doctor or health care professional if they continue or are bothersome):  · belching, flatulence  · nausea or vomiting  · stomach pain or discomfort  This list may not describe all possible side effects. Call your doctor for medical advice about side effects. You may report side effects to FDA at 3-721-FDA-8568.  Where should I keep my medicine?  Keep out of the reach of children.  This medicine may darken in color under normal storage conditions. This is because of the sugar solution and does not affect the way the medicine  works. If the solution becomes extremely dark in color, contact your health care professional before use.  Store at room temperature between 15 and 30 degrees C (59 and 86 degrees F). Do not freeze. Keep container tightly closed. Throw away any unused medicine after the expiration date.  NOTE: This sheet is a summary. It may not cover all possible information. If you have questions about this medicine, talk to your doctor, pharmacist, or health care provider.  © 2020 [employer]/Gold Standard (2009-06-23 16:04:57)      Gastritis, Adult  Gastritis is inflammation of the stomach. There are two kinds of gastritis:  · Acute gastritis. This kind develops suddenly.  · Chronic gastritis. This kind is much more common and lasts for a long time.  Gastritis happens when the lining of the stomach becomes weak or gets damaged. Without treatment, gastritis can lead to stomach bleeding and ulcers.  What are the causes?  This condition may be caused by:  · An infection.  · Drinking too much alcohol.  · Certain medicines. These include steroids, antibiotics, and some over-the-counter medicines, such as aspirin or ibuprofen.  · Having too much acid in the stomach.  · A disease of the intestines or stomach.  · Stress.  · An allergic reaction.  · Crohn's disease.  · Some cancer treatments (radiation).  Sometimes the cause of this condition is not known.  What are the signs or symptoms?  Symptoms of this condition include:  · Pain or a burning sensation in the upper abdomen.  · Nausea.  · Vomiting.  · An uncomfortable feeling of fullness after eating.  · Weight loss.  · Bad breath.  · Blood in your vomit or stools.  In some cases, there are no symptoms.  How is this diagnosed?  This condition may be diagnosed with:  · Your medical history and a description of your symptoms.  · A physical exam.  · Tests. These can include:  ? Blood tests.  ? Stool tests.  ? A test in which a thin, flexible instrument with a light and a camera is passed  down the esophagus and into the stomach (upper endoscopy).  ? A test in which a sample of tissue is taken for testing (biopsy).  How is this treated?  This condition may be treated with medicines. The medicines that are used vary depending on the cause of the gastritis:  · If the condition is caused by a bacterial infection, you may be given antibiotic medicines.  · If the condition is caused by too much acid in the stomach, you may be given medicines called H2 blockers, proton pump inhibitors, or antacids.  Treatment may also involve stopping the use of certain medicines, such as aspirin, ibuprofen, or other NSAIDs.  Follow these instructions at home:  Medicines  · Take over-the-counter and prescription medicines only as told by your health care provider.  · If you were prescribed an antibiotic medicine, take it as told by your health care provider. Do not stop taking the antibiotic even if you start to feel better.  Eating and drinking    · Eat small, frequent meals instead of large meals.  · Avoid foods and drinks that make your symptoms worse.  · Drink enough fluid to keep your urine pale yellow.  Alcohol use  · Do not drink alcohol if:  ? Your health care provider tells you not to drink.  ? You are pregnant, may be pregnant, or are planning to become pregnant.  · If you drink alcohol:  ? Limit your use to:  § 0-1 drink a day for women.  § 0-2 drinks a day for men.  ? Be aware of how much alcohol is in your drink. In the [place] one drink equals one 12 oz bottle of beer (355 mL), one 5 oz glass of wine (148 mL), or one 1½ oz glass of hard liquor (44 mL).  General instructions  · Talk with your health care provider about ways to manage stress, such as getting regular exercise or practicing deep breathing, meditation, or yoga.  · Do not use any products that contain nicotine or tobacco, such as cigarettes and e-cigarettes. If you need help quitting, ask your health care provider.  · Keep all follow-up visits as told  by your health care provider. This is important.  Contact a health care provider if:  · Your symptoms get worse.  · Your symptoms return after treatment.  Get help right away if:  · You vomit blood or material that looks like coffee grounds.  · You have black or dark red stools.  · You are unable to keep fluids down.  · Your abdominal pain gets worse.  · You have a fever.  · You do not feel better after one week.  Summary  · Gastritis is inflammation of the lining of the stomach that can occur suddenly (acute) or develop slowly over time (chronic).  · This condition is diagnosed with a medical history, a physical exam, or tests.  · This condition may be treated with medicines to treat infection or medicines to reduce the amount of acid in your stomach.  · Follow your health care provider's instructions about taking medicines, making changes to your diet, and knowing when to call for help.  This information is not intended to replace advice given to you by your health care provider. Make sure you discuss any questions you have with your health care provider.  Document Released: 12/12/2002 Document Revised: 05/07/2019 Document Reviewed: 05/07/2019  Elsevier Patient Education © 2020 Elsevier Inc.

## 2021-08-06 NOTE — PROGRESS NOTES
Report received from SAIRA Batres. Assumed care at this time. Safety measures in place. Call light in reach.

## 2021-08-06 NOTE — PROGRESS NOTES
Report given to Gisel CHÁVEZ via bedside report. Patient handed off in stable condition. Safety measures in place. Call light within reach. Care relinquished.

## 2021-08-06 NOTE — FLOWSHEET NOTE
08/05/21 210   Events/Summary/Plan   Events/Summary/Plan O2 Check   Skin Inspection Respiratory Device Intact   Vital Signs   Pulse 64   Respiration 16   Pulse Oximetry 92 %   $ Pulse Oximetry (Spot Check) Yes   O2 Alarms Set & Reviewed Yes   Oxygen   O2 (LPM) 0   O2 Delivery Device Room air w/o2 available

## 2021-08-06 NOTE — PROGRESS NOTES
Pts  called. He had questions regarding Karla's progress throughout the day. All questions and concerns addressed.  did state he wanted her to rest for tonight and to not wake Karla.

## 2021-08-06 NOTE — CARE PLAN
The patient is Stable - Low risk of patient condition declining or worsening    Shift Goals  Clinical Goals: Rest   Patient Goals:        Family Goals: Rest     Progress made toward(s) clinical / shift goals:        Problem: Hemodynamic Monitoring  Goal: Patient's hemodynamics, fluid balance and neurologic status will be stable or improve  Outcome: Progressing     Problem: Self Care  Goal: Patient will have the ability to perform ADLs independently or with assistance (bathe, groom, dress, toilet and feed)  Outcome: Progressing     Problem: Knowledge Deficit - Standard  Goal: Patient and family/care givers will demonstrate understanding of plan of care, disease process/condition, diagnostic tests and medications  Outcome: Progressing    Pt states she feels better today. Pt is ambulating to bathroom and is more awake and alert than previous day. Pt states she was happy that she ate all her dinner tonight with no nausea. Will continue to monitor pt.    Patient is not progressing towards the following goals:

## 2021-08-06 NOTE — DISCHARGE SUMMARY
"Discharge Summary    CHIEF COMPLAINT ON ADMISSION  Chief Complaint   Patient presents with   • Dizziness   • N/V       Reason for Admission  EMS     Admission Date  7/30/2021    CODE STATUS  DNAR/DNI    HPI & HOSPITAL COURSE  This is a 75 y.o. female here with nausea and vomiting.    Karla Prince is a 75 y.o. female with history of osteoporosis, dyslipidemia, tobacco use who presented 7/30/2021 with vertigo, nausea/vomiting.     Patient was in her usual state of health until wednesday 7/28 in the am, got out of bed and developed acute dizziness and n/v.  She has since been having dry heaves, \"bile\" for the last few days.  Patient also reported intermittent abdominal pain, LUQ, none now.  Her  reports he has noticed a gradual loss of appetite, lost her taste, after she had her second covid vaccine, tongue was numb, now better, can taste.  Also reported dysuria. In the ED she was found to be afebrile, normal blood pressure and heart rate.  Labs remarkable for sodium 128.  Chest x-ray negative.'s abdominal x-ray negative.  CT head negative for acute intracranial findings does have white matter lucency most consistent with chronic small vessel ischemic change. MRI brain negative for stroke.  She spiked a fever overnight on day of admission to 102F.  Blood cultures negative.  Urine grew GBS.  She was started on CTX.  She had persistent nausea/vomiting so CT AP obtained which showed very distended bladder and constipation.  Lantigua was placed and bowel regimen increased.  Gastroenterology was consulted for ongoing nausea. MRCP showed no biliary obstruction.  Status post EGD yesterday found erosive gastritis and recommended PPI twice daily and H. pylori came back negative.  She will need to follow-up outpatient with gastroenterology.  Infectious disease was consulted, after the patient finished her course of antibiotics she was afebrile and infectious disease signed off.  During stay, her sodium level decreased " despite IVF. Salt tabs started 8/1 as well as fluid restriction with slow improvement in sodium level.  Patient was finally able to have a bowel movement and her Lantigua was discontinued and she is urinating normally.  Patient started on Marinol to help with her appetite and has eaten 100% of her last 2 meals.  Patient's vital signs are stable and she is ready for discharge home to follow-up with her primary care physician for further monitoring of her sodium level.  Patient is to return to the ER if her condition worsens.     Therefore, she is discharged in fair and stable condition to home with organized home healthcare and close outpatient follow-up.    The patient met 2-midnight criteria for an inpatient stay at the time of discharge.    Discharge Date  8/6/2021    FOLLOW UP ITEMS POST DISCHARGE  Follow-up in the outpatient gastroenterology clinic.  Follow-up with primary care physician    DISCHARGE DIAGNOSES  Principal Problem (Resolved):    Intractable vomiting with nausea POA: Unknown  Active Problems:    Erosive gastritis POA: Unknown    Dyslipidemia POA: Yes    Hyponatremia POA: Unknown  Resolved Problems:    Vertigo POA: Unknown    Acute cystitis without hematuria POA: Unknown    Urinary retention POA: Unknown    New daily persistent headache POA: Unknown    Fever POA: Unknown      FOLLOW UP  Future Appointments   Date Time Provider Department Center   3/30/2022  9:30 AM Yas Phelps M.D. [facility] [place] Phelps M.D.  [address] [place] [address]  [phone]      Follow-up with primary care physician in 1 week.    Darrell Iverson M.D.  [address] [place] [address]  [phone]    Schedule an appointment as soon as possible for a visit  Follow-up with gastroenterology in 2 to 4 weeks.      MEDICATIONS ON DISCHARGE     Medication List      START taking these medications      Instructions   atorvastatin 40 MG Tabs  Commonly known as: LIPITOR   Take 1 tablet by mouth every  evening.  Dose: 40 mg     dronabinol 2.5 MG Caps  Commonly known as: MARINOL   Take 1 capsule by mouth 2 times a day for 30 days.  Dose: 2.5 mg     lactulose 20 GM/30ML Soln   Take 30 mL by mouth 3 times a day as needed for up to 10 days. Only take if still constipated after sennakot  Dose: 30 mL     omeprazole 20 MG delayed-release capsule  Commonly known as: PRILOSEC   Take 1 capsule by mouth 2 times a day.  Dose: 20 mg     senna-docusate 8.6-50 MG Tabs  Commonly known as: PERICOLACE or SENOKOT S   Take 1 tablet by mouth 2 times a day. If having normal bowel movements or loose stool can take as prn  Dose: 1 tablet     sodium chloride 1 GM Tabs  Commonly known as: SALT   Take 1 tablet by mouth 2 times a day.  Dose: 1 g        CONTINUE taking these medications      Instructions   acetaminophen 500 MG Tabs  Commonly known as: TYLENOL   Take 500-1,000 mg by mouth every 6 hours as needed for Moderate Pain (headache).  Dose: 500-1,000 mg     CITRACAL PO   Take 2 Tablets by mouth every morning.  Dose: 2 tablet     VITAMIN D PO   Take 500 Units by mouth every morning.  Dose: 500 Units        STOP taking these medications    ibuprofen 200 MG Tabs  Commonly known as: MOTRIN            Allergies  No Known Allergies    DIET  Orders Placed This Encounter   Procedures   • Diet Order Diet: Regular     Standing Status:   Standing     Number of Occurrences:   1     Order Specific Question:   Diet:     Answer:   Regular [1]       ACTIVITY  As tolerated.  Weight bearing as tolerated    CONSULTATIONS  Infectious disease  Gastroenterology    PROCEDURES  EGD with biopsies    LABORATORY  Lab Results   Component Value Date    SODIUM 125 (L) 08/06/2021    POTASSIUM 4.2 08/06/2021    CHLORIDE 93 (L) 08/06/2021    CO2 23 08/06/2021    GLUCOSE 107 (H) 08/06/2021    BUN 11 08/06/2021    CREATININE 0.48 (L) 08/06/2021        Lab Results   Component Value Date    WBC 9.9 08/04/2021    HEMOGLOBIN 13.3 08/04/2021    HEMATOCRIT 36.0 (L) 08/04/2021     PLATELETCT 219 08/04/2021        Total time of the discharge process exceeds 35 minutes.

## 2021-08-06 NOTE — CARE PLAN
"  Problem: Nutritional:  Goal: Achieve adequate nutritional intake  Description: Patient will consume >/=50% of meals and/or supplements  Outcome: Progressing   Per flowsheets pt ate cheerios and melon yesterday morning at breakfast. Per RN note last night \"Pt states she was happy that she ate all her dinner tonight with no nausea\".   Noted pt now has DC order. RD will continue to follow, should pt not DC as expected.   "

## 2021-08-08 ENCOUNTER — HOME CARE VISIT (OUTPATIENT)
Dept: HOME HEALTH SERVICES | Facility: HOME HEALTHCARE | Age: 76
End: 2021-08-08

## 2021-08-09 ENCOUNTER — PATIENT OUTREACH (OUTPATIENT)
Dept: MEDICAL GROUP | Facility: PHYSICIAN GROUP | Age: 76
End: 2021-08-09

## 2021-08-09 NOTE — PROGRESS NOTES
Patient outreach for TCM.  Reviewed medications and discharge instructions.  Verbalized understanding.  Follow up appointment confirmed for 8/23/21 at 1:30p.

## 2021-08-10 ENCOUNTER — OFFICE VISIT (OUTPATIENT)
Dept: MEDICAL GROUP | Facility: PHYSICIAN GROUP | Age: 76
End: 2021-08-10
Payer: MEDICARE

## 2021-08-10 VITALS
OXYGEN SATURATION: 96 % | DIASTOLIC BLOOD PRESSURE: 74 MMHG | RESPIRATION RATE: 16 BRPM | HEART RATE: 68 BPM | HEIGHT: 60 IN | BODY MASS INDEX: 22.34 KG/M2 | SYSTOLIC BLOOD PRESSURE: 130 MMHG | WEIGHT: 113.8 LBS | TEMPERATURE: 98.3 F

## 2021-08-10 DIAGNOSIS — K29.60 EROSIVE GASTRITIS: ICD-10-CM

## 2021-08-10 DIAGNOSIS — Z09 HOSPITAL DISCHARGE FOLLOW-UP: ICD-10-CM

## 2021-08-10 DIAGNOSIS — R30.0 DYSURIA: ICD-10-CM

## 2021-08-10 PROCEDURE — 99213 OFFICE O/P EST LOW 20 MIN: CPT | Performed by: STUDENT IN AN ORGANIZED HEALTH CARE EDUCATION/TRAINING PROGRAM

## 2021-08-10 ASSESSMENT — FIBROSIS 4 INDEX: FIB4 SCORE: 2.75

## 2021-08-10 NOTE — PROGRESS NOTES
CC:  Diagnoses of Erosive gastritis, Dysuria, and Hospital discharge follow-up were pertinent to this visit.    HISTORY OF THE PRESENT ILLNESS: Patient is a 75 y.o. female. This pleasant patient is here today for hospital discharge follow-up. Her PCP is Dr. Lenin WRIGHT.    1. Hospital discharge follow-up  -Ms. Prince presented to ER on 7/30/2021 with vertigo, nausea/vomiting. Her symptoms had begun the previous morning. She has been vomiting and had experienced intermittent abdominal pain.  She had decreased appetite for many months leading up to this visit. She was noted to be hyponatremic. Discharge diagnosis of erosive gastritis.  Discharged with omeprazole 20 mg twice daily, 1 g sodium chloride twice daily, and dronabenol 2.5 mg twice daily and she has been referred to gastroenterology. She is still in the scheduling process. She was discharged on 8/6.    -She has been having some headaches she has been afraid to take any medication for these. She has been experiencing diffuse body aches. She has had pain with urination however she has had clear urine without foul smell and no increased need to urinate. She believes this pain may be secondary to Lantigua catheter.        No problem-specific Assessment & Plan notes found for this encounter.      Current Outpatient Medications Ordered in Epic   Medication Sig Dispense Refill   • atorvastatin (LIPITOR) 40 MG Tab Take 1 tablet by mouth every evening. 30 tablet 0   • dronabinol (MARINOL) 2.5 MG Cap Take 1 capsule by mouth 2 times a day for 30 days. 60 capsule 0   • omeprazole (PRILOSEC) 20 MG delayed-release capsule Take 1 capsule by mouth 2 times a day. 60 capsule 0   • senna-docusate (PERICOLACE OR SENOKOT S) 8.6-50 MG Tab Take 1 tablet by mouth 2 times a day. If having normal bowel movements or loose stool can take as prn 60 tablet 0   • sodium chloride (SALT) 1 GM Tab Take 1 tablet by mouth 2 times a day. 60 tablet 0   • VITAMIN D PO Take 500 Units by mouth every  morning.     • Calcium Citrate (CITRACAL PO) Take 2 Tablets by mouth every morning.     • acetaminophen (TYLENOL) 500 MG Tab Take 500-1,000 mg by mouth every 6 hours as needed for Moderate Pain (headache). (Patient not taking: Reported on 8/10/2021)       No current [facility]-ordered facility-administered medications on file.       ROS:   Gen: no fevers/chills, no changes in weight.   Pulm: no sob, no cough  CV: no chest pain, no palpitations  GI: no nausea/vomiting, no diarrhea  : burning on urination. Clear urine.  MSk: General body aches.  Skin: no rash      Objective:     Exam: /74 (BP Location: Left arm, Patient Position: Sitting, BP Cuff Size: Adult)   Pulse 68   Temp 36.8 °C (98.3 °F) (Temporal)   Resp 16   Ht 1.524 m (5')   Wt 51.6 kg (113 lb 12.8 oz)   SpO2 96%  Body mass index is 22.23 kg/m².    General: Normal appearing. No distress. She appears tired.  HEENT: Normocephalic. Oropharynx is without erythema, edema or exudates.   Pulmonary: Clear to ausculation.  Normal effort. No rales, ronchi, or wheezing.  Cardiovascular: Regular rate and rhythm without murmur. Radial pulses are intact and equal bilaterally.  Abdomen: Soft, nontender, nondistended. Normal bowel sounds. Liver and spleen are not palpable  Neurologic: Grossly nonfocal  Skin: Warm and dry.  No obvious lesions. Skin turgor is normal.  Musculoskeletal: Normal gait. No extremity cyanosis, clubbing, or edema.  Psych: Normal mood and affect. Alert and oriented x3. Judgment and insight is normal.    A chaperone was offered to the patient during today's exam. Patient declined chaperone.    Labs:   8/6/2021:  -BMP revealing hyponatremia of 125.    Assessment & Plan:   75 y.o. female with the following -    1. Hospital discharge follow-up  -The patient's active medications have been reviewed. She had not been able to start her sodium chloride tablets until just recently due to difficulties in obtaining these from the pharmacy. I instructed  them to not double up on this medication and use as prescribed.  -Confirm that she was using her omeprazole 20 mg twice daily.  -I warned her not to use NSAID medications and listed common names for these.  -Informed the patient that she was okay to use acetaminophen 500 mg 2 tablets 3 times daily for her headache and myalgias.    2.  Dysuria  -This developed after the Lantigua catheter.  It has been getting better without treatment.  -I urged patient to remain hydrated.    Return in about 2 weeks (around 8/24/2021), or if symptoms worsen or fail to improve.    Please note that this dictation was created using voice recognition software. I have made every reasonable attempt to correct obvious errors, but I expect that there are errors of grammar and possibly content that I did not discover before finalizing the note.    Yogi Mosley PA-C 8/10/2021

## 2021-08-23 ENCOUNTER — OFFICE VISIT (OUTPATIENT)
Dept: MEDICAL GROUP | Facility: PHYSICIAN GROUP | Age: 76
End: 2021-08-23
Payer: MEDICARE

## 2021-08-23 ENCOUNTER — HOSPITAL ENCOUNTER (OUTPATIENT)
Dept: LAB | Facility: MEDICAL CENTER | Age: 76
End: 2021-08-23
Attending: FAMILY MEDICINE
Payer: MEDICARE

## 2021-08-23 VITALS
BODY MASS INDEX: 21.38 KG/M2 | HEART RATE: 60 BPM | OXYGEN SATURATION: 98 % | WEIGHT: 108.91 LBS | TEMPERATURE: 97.4 F | DIASTOLIC BLOOD PRESSURE: 80 MMHG | SYSTOLIC BLOOD PRESSURE: 140 MMHG | HEIGHT: 60 IN

## 2021-08-23 DIAGNOSIS — E87.1 HYPONATREMIA: ICD-10-CM

## 2021-08-23 DIAGNOSIS — K29.60 EROSIVE GASTRITIS: ICD-10-CM

## 2021-08-23 DIAGNOSIS — N39.0 URINARY TRACT INFECTION WITHOUT HEMATURIA, SITE UNSPECIFIED: ICD-10-CM

## 2021-08-23 DIAGNOSIS — E78.5 DYSLIPIDEMIA: ICD-10-CM

## 2021-08-23 LAB
ANION GAP SERPL CALC-SCNC: 15 MMOL/L (ref 7–16)
BUN SERPL-MCNC: 8 MG/DL (ref 8–22)
CALCIUM SERPL-MCNC: 9.7 MG/DL (ref 8.5–10.5)
CHLORIDE SERPL-SCNC: 95 MMOL/L (ref 96–112)
CO2 SERPL-SCNC: 23 MMOL/L (ref 20–33)
CREAT SERPL-MCNC: 0.58 MG/DL (ref 0.5–1.4)
GLUCOSE SERPL-MCNC: 104 MG/DL (ref 65–99)
POTASSIUM SERPL-SCNC: 3.1 MMOL/L (ref 3.6–5.5)
SODIUM SERPL-SCNC: 133 MMOL/L (ref 135–145)

## 2021-08-23 PROCEDURE — 80048 BASIC METABOLIC PNL TOTAL CA: CPT

## 2021-08-23 PROCEDURE — 99214 OFFICE O/P EST MOD 30 MIN: CPT | Performed by: FAMILY MEDICINE

## 2021-08-23 PROCEDURE — 36415 COLL VENOUS BLD VENIPUNCTURE: CPT

## 2021-08-23 ASSESSMENT — FIBROSIS 4 INDEX: FIB4 SCORE: 2.79

## 2021-08-24 DIAGNOSIS — E87.6 HYPOKALEMIA: ICD-10-CM

## 2021-08-24 RX ORDER — POTASSIUM CHLORIDE 20 MEQ/1
20 TABLET, EXTENDED RELEASE ORAL DAILY
Qty: 15 TABLET | Refills: 0 | Status: SHIPPED | OUTPATIENT
Start: 2021-08-24 | End: 2023-02-09

## 2021-08-24 NOTE — PROGRESS NOTES
Subjective     Karla Prince is a 76 y.o. female who presents with Hospital Follow-up            HPI   Patient returns for follow-up  After recent hospitalization at [facility] from 7/30-8/6/2021 for dizziness, nausea, vomiting.  Chest x-ray, abdominal x-ray negative for acute abnormalities.  CT scan of the head came back no acute problems.  MRI of the brain negative for any acute problems.  She was febrile when she was in the hospital and blood cultures were negative but the urine grew strep agalactiae which was treated appropriately with antibiotics.  She had CT of the abdomen and pelvis which showed distended bladder and constipation.  MRCP did not show any biliary obstruction.  Gastroenterology was consulted and she had EGD done that showed erosive gastritis and she was put on PPI twice daily.  Infectious disease was consulted and she finished course of antibiotics for the strep agalactiae.  She was hyponatremic in the 120s.  She was given salt tablets.    She was sent home on omeprazole 20 mg twice a day, sodium tablets 1 g twice a day, senna/docusate 1 tablet twice a day, Marinol 2.5 mg 1 capsule twice a day.  She was also started on atorvastatin 40 mg daily for elevated cholesterol panel.  She said she stopped all of the medications last week because she continued to feel sick.  She started feeling better off the medications.  She has been advancing her diet and she is keeping them down.  She has not had any episodes of vomiting.  No more problems with her bowels.  Denies any problem with her urination except for minimal burning probably from having a Lantigua catheter in place.  Her appointment with a GI specialist is on September 15.    Past medical history, past surgical history, family history reviewed-no changes    Social history reviewed-no changes    Allergies reviewed-no changes    Medications reviewed-no changes    ROS   As per HPI, the rest are negative.             Objective     /80  (BP Location: Left arm, Patient Position: Sitting, BP Cuff Size: Adult)   Pulse 60   Temp 36.3 °C (97.4 °F) (Temporal)   Ht 1.524 m (5')   Wt 49.4 kg (108 lb 14.5 oz)   SpO2 98%   BMI 21.27 kg/m²      Physical Exam     Examined alert, awake, oriented, not in distress    Neck-supple, no lymphadenopathy, no thyromegaly  Lungs-clear to auscultation, no rales, no wheezes  Heart-regular rate and rhythm, no murmur  Abdomen-good bowel sounds, soft, nontender, no hepatosplenomegaly, no masses   Extremities-no edema, clubbing, cyanosis          I reviewed the hospital records             Assessment & Plan        1. Erosive gastritis  Advised the patient to start back on omeprazole and to take 20 mg 1 tablet daily on an empty stomach 30 minutes before breakfast to see if she can tolerate it.  Advised complete avoidance of caffeine including green tea, spicy foods, aspirin and NSAIDs.  Keep appointment as scheduled with GI specialist.  Advance diet as tolerated.  Avoid dairy products for now until she is 100% fully recovered.  - Basic Metabolic Panel; Future    2. Hyponatremia  We will do updated blood work to check her sodium level.  She has stopped taking the sodium tablets already a week ago.  - Basic Metabolic Panel; Future    3. Urinary tract infection without hematuria, site unspecified  This was treated appropriately with antibiotics in the hospital.  She has minimal burning on urination which could be from the Lantigua catheter that was in place while she was in the hospital.  - Basic Metabolic Panel; Future    4. Dyslipidemia  We we will hold the atorvastatin for now.  We will reevaluate with another blood work in few months and reassess the need to be on statin at that time.  - Basic Metabolic Panel; Future    I will see her for follow-up in a month after she sees the GI specialist.      Please note that this dictation was created using voice recognition software. I have worked with consultants from the vendor as  well as technical experts from [facility] to optimize the interface. I have made every reasonable attempt to correct obvious errors, but I expect that there are errors of grammar and possibly content I did not discover before finalizing the note.

## 2021-09-01 ENCOUNTER — TELEPHONE (OUTPATIENT)
Dept: MEDICAL GROUP | Facility: PHYSICIAN GROUP | Age: 76
End: 2021-09-01

## 2021-09-01 DIAGNOSIS — E87.6 HYPOKALEMIA: ICD-10-CM

## 2021-09-01 NOTE — TELEPHONE ENCOUNTER
Pt spouse is here because he said pt was suppose to have labs drawn again to check her potassium but there is not an order for this. Please call pt with any questions and if she needs to have labs done if  Can put an order in for this

## 2021-09-01 NOTE — TELEPHONE ENCOUNTER
Patient called and was asking about fasting or not.  Looked n order and it stated fasting but talked to Dr. Phelps and it is a nonfasting lab.

## 2021-09-02 ENCOUNTER — HOSPITAL ENCOUNTER (OUTPATIENT)
Dept: LAB | Facility: MEDICAL CENTER | Age: 76
End: 2021-09-02
Attending: FAMILY MEDICINE
Payer: MEDICARE

## 2021-09-02 DIAGNOSIS — E87.6 HYPOKALEMIA: ICD-10-CM

## 2021-09-02 LAB
ANION GAP SERPL CALC-SCNC: 11 MMOL/L (ref 7–16)
BUN SERPL-MCNC: 8 MG/DL (ref 8–22)
CALCIUM SERPL-MCNC: 10.1 MG/DL (ref 8.5–10.5)
CHLORIDE SERPL-SCNC: 95 MMOL/L (ref 96–112)
CO2 SERPL-SCNC: 24 MMOL/L (ref 20–33)
CREAT SERPL-MCNC: 0.58 MG/DL (ref 0.5–1.4)
GLUCOSE SERPL-MCNC: 106 MG/DL (ref 65–99)
POTASSIUM SERPL-SCNC: 5 MMOL/L (ref 3.6–5.5)
SODIUM SERPL-SCNC: 130 MMOL/L (ref 135–145)

## 2021-09-02 PROCEDURE — 36415 COLL VENOUS BLD VENIPUNCTURE: CPT

## 2021-09-02 PROCEDURE — 80048 BASIC METABOLIC PNL TOTAL CA: CPT

## 2021-09-23 ENCOUNTER — OFFICE VISIT (OUTPATIENT)
Dept: MEDICAL GROUP | Facility: PHYSICIAN GROUP | Age: 76
End: 2021-09-23
Payer: MEDICARE

## 2021-09-23 VITALS
BODY MASS INDEX: 21.04 KG/M2 | TEMPERATURE: 97.4 F | HEIGHT: 60 IN | HEART RATE: 75 BPM | SYSTOLIC BLOOD PRESSURE: 140 MMHG | WEIGHT: 107.14 LBS | DIASTOLIC BLOOD PRESSURE: 80 MMHG | OXYGEN SATURATION: 96 %

## 2021-09-23 DIAGNOSIS — E55.9 VITAMIN D DEFICIENCY: ICD-10-CM

## 2021-09-23 DIAGNOSIS — K29.60 EROSIVE GASTRITIS: ICD-10-CM

## 2021-09-23 DIAGNOSIS — R73.01 IMPAIRED FASTING BLOOD SUGAR: ICD-10-CM

## 2021-09-23 DIAGNOSIS — E78.5 DYSLIPIDEMIA: ICD-10-CM

## 2021-09-23 DIAGNOSIS — R03.0 WHITE COAT SYNDROME WITHOUT HYPERTENSION: ICD-10-CM

## 2021-09-23 DIAGNOSIS — M85.89 OSTEOPENIA OF MULTIPLE SITES: ICD-10-CM

## 2021-09-23 DIAGNOSIS — E87.1 HYPONATREMIA: ICD-10-CM

## 2021-09-23 PROCEDURE — 99214 OFFICE O/P EST MOD 30 MIN: CPT | Performed by: FAMILY MEDICINE

## 2021-09-23 ASSESSMENT — FIBROSIS 4 INDEX: FIB4 SCORE: 2.79

## 2021-09-23 NOTE — PROGRESS NOTES
Subjective     Karla Prince is a 76 y.o. female who presents with GI Problem (gastritis)            HPI     Patient returns for follow-up of medical problems.    In terms of the erosive gastritis this was deemed to be due to NSAID/aspirin.  She has seen the GI specialist for follow-up and she was told to continue taking the omeprazole for another 2 to 3 months.  She is currently asymptomatic.  She is tolerating the omeprazole.  She has completely avoided NSAIDs and aspirin and is only taking Tylenol as needed.    She had hyponatremia in the 120s when she was hospitalized for the nausea and vomiting due to erosive gastritis back in July 2021.  She was discharged on sodium tablets.  We discontinued the tablets and her follow-up sodium level done 3 weeks ago came back at 130.    For her dyslipidemia, she has been off statin since she was in the hospital in July 2021.  She has been managing this with her own efforts only.  We need to do updated lipid panel and if 10-year ASCVD risk score is not high she does not need to be back on the statin anymore.    We will follow her for impaired fasting blood sugar and she continues to manage this by watching her diet.    For her whitecoat syndrome without hypertension, blood pressure remains very good at home and before coming to the office this afternoon it was 111/72.  Here in the office it is 140/70.    She continues to take vitamin D supplementation for vitamin D deficiency.    She continues to take calcium and vitamin D supplementation for osteopenia of multiple sites.  The last bone density scan was in May 2021.    Past medical history, past surgical history, family history reviewed-no changes    Social history reviewed-no changes    Allergies reviewed-no changes    Medications reviewed-no changes    ROS     As per HPI, the rest are negative.           Objective     /80 (BP Location: Left arm, Patient Position: Sitting, BP Cuff Size: Adult)   Pulse 75   Temp 36.3 °C  (97.4 °F) (Temporal)   Ht 1.524 m (5')   Wt 48.6 kg (107 lb 2.3 oz)   SpO2 96%   BMI 20.93 kg/m²      Physical Exam     Examined alert, awake, oriented, not in distress    Neck-supple, no lymphadenopathy, no thyromegaly  Lungs-clear to auscultation, no rales, no wheezes  Heart-regular rate and rhythm, no murmur  Extremities-no edema, clubbing, cyanosis        Hospital Outpatient Visit on 09/02/2021   Component Date Value Ref Range Status   • Sodium 09/02/2021 130* 135 - 145 mmol/L Final   • Potassium 09/02/2021 5.0  3.6 - 5.5 mmol/L Final   • Chloride 09/02/2021 95* 96 - 112 mmol/L Final   • Co2 09/02/2021 24  20 - 33 mmol/L Final   • Glucose 09/02/2021 106* 65 - 99 mg/dL Final   • Bun 09/02/2021 8  8 - 22 mg/dL Final   • Creatinine 09/02/2021 0.58  0.50 - 1.40 mg/dL Final   • Calcium 09/02/2021 10.1  8.5 - 10.5 mg/dL Final   • Anion Gap 09/02/2021 11.0  7.0 - 16.0 Final   • GFR If  09/02/2021 >60  >60 mL/min/1.73 m 2 Final   • GFR If Non  09/02/2021 >60  >60 mL/min/1.73 m 2 Final                     Assessment & Plan        1. Erosive gastritis  Continue omeprazole as ordered by GI specialist.  Continue avoidance of NSAIDs and aspirin indefinitely.  Currently asymptomatic.    2. Hyponatremia  Her hyponatremia improved but not yet back to normal.  We will do updated blood work next visit.    3. Dyslipidemia  We will do updated lipid panel off statin and we will see if we need to put her back on the statin again depending on 10-year ASCVD risk score.    4. Impaired fasting blood sugar  She will continue to manage this with diet.    5. White coat syndrome without hypertension  Blood pressure remains very good at home.  We will continue to follow.    6. Vitamin D deficiency  Continue vitamin D supplementation and we will do updated vitamin D level.    7. Osteopenia of multiple sites  Continue calcium and vitamin D supplementation.  She is up-to-date with DEXA scan.         Follow-up  in 6 months.      Please note that this dictation was created using voice recognition software. I have worked with consultants from the vendor as well as technical experts from [facility] to optimize the interface. I have made every reasonable attempt to correct obvious errors, but I expect that there are errors of grammar and possibly content I did not discover before finalizing the note.

## 2021-10-12 ENCOUNTER — TELEPHONE (OUTPATIENT)
Dept: MEDICAL GROUP | Facility: PHYSICIAN GROUP | Age: 76
End: 2021-10-12

## 2021-10-12 VITALS — SYSTOLIC BLOOD PRESSURE: 116 MMHG | DIASTOLIC BLOOD PRESSURE: 72 MMHG

## 2021-10-12 NOTE — TELEPHONE ENCOUNTER
Phone conversation with patient, she provided home blood pressure reading, within normal limits, see Flowsheets.

## 2021-11-06 NOTE — PROGRESS NOTES
Report received from Misti PACU RN. Pt back on telemetry monitoring. Awaiting pt to room 314/1.   No

## 2021-11-08 ENCOUNTER — HOSPITAL ENCOUNTER (OUTPATIENT)
Dept: LAB | Facility: MEDICAL CENTER | Age: 76
End: 2021-11-08
Attending: FAMILY MEDICINE
Payer: MEDICARE

## 2021-11-08 DIAGNOSIS — E55.9 VITAMIN D DEFICIENCY: ICD-10-CM

## 2021-11-08 DIAGNOSIS — E78.5 DYSLIPIDEMIA: ICD-10-CM

## 2021-11-08 DIAGNOSIS — R03.0 WHITE COAT SYNDROME WITHOUT HYPERTENSION: ICD-10-CM

## 2021-11-08 DIAGNOSIS — E87.1 HYPONATREMIA: ICD-10-CM

## 2021-11-08 DIAGNOSIS — R73.01 IMPAIRED FASTING BLOOD SUGAR: ICD-10-CM

## 2021-11-08 DIAGNOSIS — K29.60 EROSIVE GASTRITIS: ICD-10-CM

## 2021-11-08 LAB
ALBUMIN SERPL BCP-MCNC: 4.5 G/DL (ref 3.2–4.9)
ALBUMIN/GLOB SERPL: 1.6 G/DL
ALP SERPL-CCNC: 84 U/L (ref 30–99)
ALT SERPL-CCNC: 17 U/L (ref 2–50)
ANION GAP SERPL CALC-SCNC: 10 MMOL/L (ref 7–16)
AST SERPL-CCNC: 21 U/L (ref 12–45)
BILIRUB SERPL-MCNC: 0.5 MG/DL (ref 0.1–1.5)
BUN SERPL-MCNC: 10 MG/DL (ref 8–22)
CALCIUM SERPL-MCNC: 9.6 MG/DL (ref 8.5–10.5)
CHLORIDE SERPL-SCNC: 103 MMOL/L (ref 96–112)
CHOLEST SERPL-MCNC: 198 MG/DL (ref 100–199)
CO2 SERPL-SCNC: 27 MMOL/L (ref 20–33)
CREAT SERPL-MCNC: 0.71 MG/DL (ref 0.5–1.4)
EST. AVERAGE GLUCOSE BLD GHB EST-MCNC: 94 MG/DL
GLOBULIN SER CALC-MCNC: 2.9 G/DL (ref 1.9–3.5)
GLUCOSE SERPL-MCNC: 72 MG/DL (ref 65–99)
HBA1C MFR BLD: 4.9 % (ref 4–5.6)
HDLC SERPL-MCNC: 59 MG/DL
LDLC SERPL CALC-MCNC: 99 MG/DL
POTASSIUM SERPL-SCNC: 4.5 MMOL/L (ref 3.6–5.5)
PROT SERPL-MCNC: 7.4 G/DL (ref 6–8.2)
SODIUM SERPL-SCNC: 140 MMOL/L (ref 135–145)
TRIGL SERPL-MCNC: 202 MG/DL (ref 0–149)

## 2021-11-08 PROCEDURE — 83036 HEMOGLOBIN GLYCOSYLATED A1C: CPT

## 2021-11-08 PROCEDURE — 80061 LIPID PANEL: CPT

## 2021-11-08 PROCEDURE — 36415 COLL VENOUS BLD VENIPUNCTURE: CPT

## 2021-11-08 PROCEDURE — 80053 COMPREHEN METABOLIC PANEL: CPT

## 2021-11-08 PROCEDURE — 82306 VITAMIN D 25 HYDROXY: CPT

## 2021-11-10 LAB — 25(OH)D3 SERPL-MCNC: 39 NG/ML (ref 30–80)

## 2022-03-02 ENCOUNTER — PATIENT MESSAGE (OUTPATIENT)
Dept: HEALTH INFORMATION MANAGEMENT | Facility: OTHER | Age: 77
End: 2022-03-02
Payer: MEDICARE

## 2022-03-30 ENCOUNTER — OFFICE VISIT (OUTPATIENT)
Dept: MEDICAL GROUP | Facility: PHYSICIAN GROUP | Age: 77
End: 2022-03-30
Payer: MEDICARE

## 2022-03-30 VITALS
WEIGHT: 110.89 LBS | OXYGEN SATURATION: 95 % | HEART RATE: 64 BPM | BODY MASS INDEX: 21.77 KG/M2 | DIASTOLIC BLOOD PRESSURE: 90 MMHG | HEIGHT: 60 IN | TEMPERATURE: 97.1 F | SYSTOLIC BLOOD PRESSURE: 170 MMHG

## 2022-03-30 DIAGNOSIS — R73.01 IMPAIRED FASTING BLOOD SUGAR: ICD-10-CM

## 2022-03-30 DIAGNOSIS — Z23 NEED FOR SHINGLES VACCINE: ICD-10-CM

## 2022-03-30 DIAGNOSIS — E55.9 VITAMIN D DEFICIENCY: ICD-10-CM

## 2022-03-30 DIAGNOSIS — R03.0 WHITE COAT SYNDROME WITHOUT HYPERTENSION: ICD-10-CM

## 2022-03-30 DIAGNOSIS — E78.5 DYSLIPIDEMIA: ICD-10-CM

## 2022-03-30 DIAGNOSIS — M85.89 OSTEOPENIA OF MULTIPLE SITES: ICD-10-CM

## 2022-03-30 PROCEDURE — 99214 OFFICE O/P EST MOD 30 MIN: CPT | Performed by: FAMILY MEDICINE

## 2022-03-30 RX ORDER — ZOSTER VACCINE RECOMBINANT, ADJUVANTED 50 MCG/0.5
0.5 KIT INTRAMUSCULAR ONCE
Qty: 1 EACH | Refills: 1 | Status: SHIPPED | OUTPATIENT
Start: 2022-03-30 | End: 2022-03-30

## 2022-03-30 ASSESSMENT — PATIENT HEALTH QUESTIONNAIRE - PHQ9: CLINICAL INTERPRETATION OF PHQ2 SCORE: 0

## 2022-03-30 ASSESSMENT — FIBROSIS 4 INDEX: FIB4 SCORE: 1.72

## 2022-03-31 NOTE — PROGRESS NOTES
Subjective     Karla Prince is a 76 y.o. female who presents with Other (Erosive gastritis)            HPI     Patient is here for follow-up of her medical problems.    In terms of the dyslipidemia, patient continues to take atorvastatin without myalgias.    We continue to follow her for impaired fasting blood sugar and she manages this by watching her diet.    We continue to follow her for whitecoat hypertension.  Blood pressure tends to run high here in the office but at home they are running good.    She continues take vitamin D supplementation for vitamin D deficiency.    She has osteopenia of multiple sites and she takes calcium and vitamin D supplementation.  The last bone density scan was in May 2021.    She needs shingles vaccine.  She will get this from the pharmacy at this is not available in the office.    Past medical history, past surgical history, family history reviewed-no changes    Social history reviewed-no changes    Allergies reviewed-no changes    Medications reviewed-no changes    ROS     As per HPI, the rest are negative.           Objective     BP (!) 170/90 (BP Location: Left arm, Patient Position: Sitting, BP Cuff Size: Adult)   Pulse 64   Temp 36.2 °C (97.1 °F) (Temporal)   Ht 1.524 m (5')   Wt 50.3 kg (110 lb 14.3 oz)   SpO2 95%   BMI 21.66 kg/m²      Physical Exam     Examined alert, awake, oriented, not in distress    Neck-supple, no lymphadenopathy, no thyromegaly  Lungs-clear to auscultation, no rales, no wheezes  Heart-regular rate and rhythm, no murmur  Extremities-no edema, clubbing, cyanosis             Hospital Outpatient Visit on 11/08/2021   Component Date Value Ref Range Status   • Glycohemoglobin 11/08/2021 4.9  4.0 - 5.6 % Final    Comment: Increased risk for diabetes:  5.7 -6.4%  Diabetes:  >6.4%  Glycemic control for adults with diabetes:  <7.0%    The above interpretations are per ADA guidelines.  Diagnosis  of diabetes mellitus on the basis of elevated Hemoglobin  A1c  should be confirmed by repeating the Hb A1c test.     • Est Avg Glucose 11/08/2021 94  mg/dL Final    Comment: The eAG calculation is based on the A1c-Derived Daily Glucose  (ADAG) study.  See the ADA's website for additional information.     • Sodium 11/08/2021 140  135 - 145 mmol/L Final   • Potassium 11/08/2021 4.5  3.6 - 5.5 mmol/L Final   • Chloride 11/08/2021 103  96 - 112 mmol/L Final   • Co2 11/08/2021 27  20 - 33 mmol/L Final   • Anion Gap 11/08/2021 10.0  7.0 - 16.0 Final   • Glucose 11/08/2021 72  65 - 99 mg/dL Final   • Bun 11/08/2021 10  8 - 22 mg/dL Final   • Creatinine 11/08/2021 0.71  0.50 - 1.40 mg/dL Final   • Calcium 11/08/2021 9.6  8.5 - 10.5 mg/dL Final   • AST(SGOT) 11/08/2021 21  12 - 45 U/L Final   • ALT(SGPT) 11/08/2021 17  2 - 50 U/L Final   • Alkaline Phosphatase 11/08/2021 84  30 - 99 U/L Final   • Total Bilirubin 11/08/2021 0.5  0.1 - 1.5 mg/dL Final   • Albumin 11/08/2021 4.5  3.2 - 4.9 g/dL Final   • Total Protein 11/08/2021 7.4  6.0 - 8.2 g/dL Final   • Globulin 11/08/2021 2.9  1.9 - 3.5 g/dL Final   • A-G Ratio 11/08/2021 1.6  g/dL Final   • Cholesterol,Tot 11/08/2021 198  100 - 199 mg/dL Final   • Triglycerides 11/08/2021 202 (A) 0 - 149 mg/dL Final   • HDL 11/08/2021 59  >=40 mg/dL Final   • LDL 11/08/2021 99  <100 mg/dL Final   • 25-Hydroxy   Vitamin D 25 11/08/2021 39  30 - 80 ng/mL Final    Comment: INTERPRETIVE INFORMATION: Vitamin D, 25-Hydroxy  This assay accurately quantifies the sum of vitamin D3, 25-hydroxy  and vitamin D2, 25-hydroxy.  0-17 years:  Deficiency: less than 20 ng/mL  Optimum level: greater than or equal to 20 ng/mL*  *(Hunter CL et al. Pediatrics 2008; 122: 1142-52.)  18 years and older:  Deficiency: Less than 20 ng/mL  Insufficiency: 20-29 ng/mL  Optimum Level: 30-80 ng/mL  Possible Toxicity: Greater than 150 ng/mL  Performed By: [facility]  [address] [place] [id number]  : Swetha Conteh MD     • GFR If African  American 11/08/2021 >60  >60 mL/min/1.73 m 2 Final   • GFR If Non  11/08/2021 >60  >60 mL/min/1.73 m 2 Final                  Assessment & Plan        1. Dyslipidemia  Her last blood work All at goal except triglycerides are still high although better than before.  The LDL cholesterol is now down to goal.  HDL is still at goal.  Continue cholesterol medication.  We will do updated blood work next visit.  - Lipid Profile; Future  - Comp Metabolic Panel; Future  - HEMOGLOBIN A1C; Future  - CBC WITH DIFFERENTIAL; Future  - VITAMIN D,25 HYDROXY; Future    2. Impaired fasting blood sugar  Last fasting blood sugar and hemoglobin A1c were both at target/normal.  Continue avoidance of sweets and decreasing the carbs.  - Lipid Profile; Future  - Comp Metabolic Panel; Future  - HEMOGLOBIN A1C; Future  - CBC WITH DIFFERENTIAL; Future  - VITAMIN D,25 HYDROXY; Future    3. White coat syndrome without hypertension  She continues to have elevated blood pressure readings here in the office but home blood pressures are running good.  We will continue to keep an eye on this.   - Lipid Profile; Future  - Comp Metabolic Panel; Future  - HEMOGLOBIN A1C; Future  - CBC WITH DIFFERENTIAL; Future  - VITAMIN D,25 HYDROXY; Future    4. Vitamin D deficiency  Adequately replaced per last blood work.  Continue the same dose of vitamin D supplementation and we will do updated vitamin D level.  - Lipid Profile; Future  - Comp Metabolic Panel; Future  - HEMOGLOBIN A1C; Future  - CBC WITH DIFFERENTIAL; Future  - VITAMIN D,25 HYDROXY; Future    5. Osteopenia of multiple sites  Continue calcium and vitamin D supplementation.  - Lipid Profile; Future  - Comp Metabolic Panel; Future  - HEMOGLOBIN A1C; Future  - CBC WITH DIFFERENTIAL; Future  - VITAMIN D,25 HYDROXY; Future    6. Need for shingles vaccine  She will get the Shingrix vaccine from the pharmacy.  She will need 2 doses 2 to 6 months apart.  - Zoster Vac Recomb Adjuvanted  (SHINGRIX) 50 MCG/0.5ML Recon Susp; Inject 0.5 mL into the shoulder, thigh, or buttocks one time for 1 dose.  Dispense: 1 Each; Refill: 1      Return in 6 months.      Please note that this dictation was created using voice recognition software. I have worked with consultants from the vendor as well as technical experts from [facility] to optimize the interface. I have made every reasonable attempt to correct obvious errors, but I expect that there are errors of grammar and possibly content I did not discover before finalizing the note.

## 2022-06-17 ENCOUNTER — TELEPHONE (OUTPATIENT)
Dept: HEALTH INFORMATION MANAGEMENT | Facility: OTHER | Age: 77
End: 2022-06-17
Payer: MEDICARE

## 2022-08-26 ENCOUNTER — HOSPITAL ENCOUNTER (OUTPATIENT)
Dept: LAB | Facility: MEDICAL CENTER | Age: 77
End: 2022-08-26
Attending: FAMILY MEDICINE
Payer: MEDICARE

## 2022-08-26 ENCOUNTER — DOCUMENTATION (OUTPATIENT)
Dept: HEALTH INFORMATION MANAGEMENT | Facility: OTHER | Age: 77
End: 2022-08-26
Payer: MEDICARE

## 2022-08-26 ENCOUNTER — PATIENT MESSAGE (OUTPATIENT)
Dept: HEALTH INFORMATION MANAGEMENT | Facility: OTHER | Age: 77
End: 2022-08-26

## 2022-08-26 DIAGNOSIS — R73.01 IMPAIRED FASTING BLOOD SUGAR: ICD-10-CM

## 2022-08-26 DIAGNOSIS — E78.5 DYSLIPIDEMIA: ICD-10-CM

## 2022-08-26 DIAGNOSIS — E55.9 VITAMIN D DEFICIENCY: ICD-10-CM

## 2022-08-26 DIAGNOSIS — R03.0 WHITE COAT SYNDROME WITHOUT HYPERTENSION: ICD-10-CM

## 2022-08-26 DIAGNOSIS — M85.89 OSTEOPENIA OF MULTIPLE SITES: ICD-10-CM

## 2022-08-26 LAB
25(OH)D3 SERPL-MCNC: 51 NG/ML (ref 30–100)
ALBUMIN SERPL BCP-MCNC: 4.7 G/DL (ref 3.2–4.9)
ALBUMIN/GLOB SERPL: 2 G/DL
ALP SERPL-CCNC: 60 U/L (ref 30–99)
ALT SERPL-CCNC: 16 U/L (ref 2–50)
ANION GAP SERPL CALC-SCNC: 11 MMOL/L (ref 7–16)
AST SERPL-CCNC: 17 U/L (ref 12–45)
BASOPHILS # BLD AUTO: 1.2 % (ref 0–1.8)
BASOPHILS # BLD: 0.08 K/UL (ref 0–0.12)
BILIRUB SERPL-MCNC: 0.7 MG/DL (ref 0.1–1.5)
BUN SERPL-MCNC: 12 MG/DL (ref 8–22)
CALCIUM SERPL-MCNC: 9.6 MG/DL (ref 8.5–10.5)
CHLORIDE SERPL-SCNC: 103 MMOL/L (ref 96–112)
CHOLEST SERPL-MCNC: 216 MG/DL (ref 100–199)
CO2 SERPL-SCNC: 25 MMOL/L (ref 20–33)
CREAT SERPL-MCNC: 0.72 MG/DL (ref 0.5–1.4)
EOSINOPHIL # BLD AUTO: 0.19 K/UL (ref 0–0.51)
EOSINOPHIL NFR BLD: 2.8 % (ref 0–6.9)
ERYTHROCYTE [DISTWIDTH] IN BLOOD BY AUTOMATED COUNT: 45.9 FL (ref 35.9–50)
EST. AVERAGE GLUCOSE BLD GHB EST-MCNC: 100 MG/DL
FASTING STATUS PATIENT QL REPORTED: NORMAL
GFR SERPLBLD CREATININE-BSD FMLA CKD-EPI: 87 ML/MIN/1.73 M 2
GLOBULIN SER CALC-MCNC: 2.4 G/DL (ref 1.9–3.5)
GLUCOSE SERPL-MCNC: 90 MG/DL (ref 65–99)
HBA1C MFR BLD: 5.1 % (ref 4–5.6)
HCT VFR BLD AUTO: 44.1 % (ref 37–47)
HDLC SERPL-MCNC: 63 MG/DL
HGB BLD-MCNC: 14.7 G/DL (ref 12–16)
IMM GRANULOCYTES # BLD AUTO: 0.02 K/UL (ref 0–0.11)
IMM GRANULOCYTES NFR BLD AUTO: 0.3 % (ref 0–0.9)
LDLC SERPL CALC-MCNC: 109 MG/DL
LYMPHOCYTES # BLD AUTO: 2.34 K/UL (ref 1–4.8)
LYMPHOCYTES NFR BLD: 34.9 % (ref 22–41)
MCH RBC QN AUTO: 33.1 PG (ref 27–33)
MCHC RBC AUTO-ENTMCNC: 33.3 G/DL (ref 33.6–35)
MCV RBC AUTO: 99.3 FL (ref 81.4–97.8)
MONOCYTES # BLD AUTO: 0.46 K/UL (ref 0–0.85)
MONOCYTES NFR BLD AUTO: 6.9 % (ref 0–13.4)
NEUTROPHILS # BLD AUTO: 3.61 K/UL (ref 2–7.15)
NEUTROPHILS NFR BLD: 53.9 % (ref 44–72)
NRBC # BLD AUTO: 0 K/UL
NRBC BLD-RTO: 0 /100 WBC
PLATELET # BLD AUTO: 268 K/UL (ref 164–446)
PMV BLD AUTO: 11.6 FL (ref 9–12.9)
POTASSIUM SERPL-SCNC: 4.2 MMOL/L (ref 3.6–5.5)
PROT SERPL-MCNC: 7.1 G/DL (ref 6–8.2)
RBC # BLD AUTO: 4.44 M/UL (ref 4.2–5.4)
SODIUM SERPL-SCNC: 139 MMOL/L (ref 135–145)
TRIGL SERPL-MCNC: 220 MG/DL (ref 0–149)
WBC # BLD AUTO: 6.7 K/UL (ref 4.8–10.8)

## 2022-08-26 PROCEDURE — 36415 COLL VENOUS BLD VENIPUNCTURE: CPT

## 2022-08-26 PROCEDURE — 85025 COMPLETE CBC W/AUTO DIFF WBC: CPT

## 2022-08-26 PROCEDURE — 82306 VITAMIN D 25 HYDROXY: CPT

## 2022-08-26 PROCEDURE — 83036 HEMOGLOBIN GLYCOSYLATED A1C: CPT

## 2022-08-26 PROCEDURE — 80061 LIPID PANEL: CPT

## 2022-08-26 PROCEDURE — 80053 COMPREHEN METABOLIC PANEL: CPT

## 2022-08-29 ENCOUNTER — OFFICE VISIT (OUTPATIENT)
Dept: MEDICAL GROUP | Facility: PHYSICIAN GROUP | Age: 77
End: 2022-08-29
Payer: MEDICARE

## 2022-08-29 VITALS
HEIGHT: 60 IN | WEIGHT: 113.1 LBS | SYSTOLIC BLOOD PRESSURE: 160 MMHG | DIASTOLIC BLOOD PRESSURE: 80 MMHG | OXYGEN SATURATION: 98 % | BODY MASS INDEX: 22.2 KG/M2 | TEMPERATURE: 98.3 F | HEART RATE: 68 BPM

## 2022-08-29 DIAGNOSIS — M85.89 OSTEOPENIA OF MULTIPLE SITES: ICD-10-CM

## 2022-08-29 DIAGNOSIS — R03.0 WHITE COAT SYNDROME WITHOUT HYPERTENSION: ICD-10-CM

## 2022-08-29 DIAGNOSIS — E78.5 DYSLIPIDEMIA: ICD-10-CM

## 2022-08-29 DIAGNOSIS — R73.01 IMPAIRED FASTING BLOOD SUGAR: ICD-10-CM

## 2022-08-29 DIAGNOSIS — E55.9 VITAMIN D DEFICIENCY: ICD-10-CM

## 2022-08-29 PROCEDURE — 99214 OFFICE O/P EST MOD 30 MIN: CPT | Performed by: FAMILY MEDICINE

## 2022-08-29 ASSESSMENT — FIBROSIS 4 INDEX: FIB4 SCORE: 1.19

## 2022-08-29 NOTE — PROGRESS NOTES
Subjective     Karla Prince is a 77 y.o. female who presents with Hyperlipidemia            HPI    Patient is here for follow-up of her medical problems.    Patient has whitecoat syndrome without hypertension.  Blood pressure runs high here in the office but at home it always runs good.  She said 4 days ago it was 102/65 and 2 days ago it was 117/71.    We will follow her for dyslipidemia and she manages this by watching her diet.    For impaired fasting blood sugar, patient continues to avoid sweets and decrease the carbs.    She takes vitamin D supplementation for vitamin D deficiency.    For osteopenia of multiple sites, she continues to take calcium and vitamin D supplementation.  The last bone density scan was last year.    Past medical history, past surgical history, family history reviewed-no changes    Social history reviewed-no changes    Allergies reviewed-no changes    Medications reviewed-no changes    ROS    As per HPI, the rest are negative.           Objective     BP (!) 160/80 (BP Location: Left arm, Patient Position: Sitting, BP Cuff Size: Adult)   Pulse 68   Temp 36.8 °C (98.3 °F) (Temporal)   Ht 1.524 m (5')   Wt 51.3 kg (113 lb 1.5 oz)   SpO2 98%   BMI 22.09 kg/m²      Physical Exam    Examined alert, awake, oriented, not in distress    Neck-supple, no lymphadenopathy, no thyromegaly  Lungs-clear to auscultation, no rales, no wheezes  Heart-regular rate and rhythm, no murmur  Extremities-no edema, clubbing, cyanosis          Hospital Outpatient Visit on 08/26/2022   Component Date Value Ref Range Status    25-Hydroxy   Vitamin D 25 08/26/2022 51  30 - 100 ng/mL Final    Comment: Adult Ranges:   <20 ng/mL - Deficiency  20-29 ng/mL - Insufficiency   ng/mL - Sufficiency  Effective 3/18/2020, this electrochemiluminescence binding assay is  performed using Roche samreen e immunoassay analyzer.  The Ele[employer]ys Vitamin D  total II assay is intended for the quantitative determination of total  25  hydroxyvitamin D in human serum and plasma. This assay is to be used as an  aid in the assessment of vitamin D sufficiency in adults.      WBC 08/26/2022 6.7  4.8 - 10.8 K/uL Final    RBC 08/26/2022 4.44  4.20 - 5.40 M/uL Final    Hemoglobin 08/26/2022 14.7  12.0 - 16.0 g/dL Final    Hematocrit 08/26/2022 44.1  37.0 - 47.0 % Final    MCV 08/26/2022 99.3 (A) 81.4 - 97.8 fL Final    MCH 08/26/2022 33.1 (A) 27.0 - 33.0 pg Final    MCHC 08/26/2022 33.3 (A) 33.6 - 35.0 g/dL Final    RDW 08/26/2022 45.9  35.9 - 50.0 fL Final    Platelet Count 08/26/2022 268  164 - 446 K/uL Final    MPV 08/26/2022 11.6  9.0 - 12.9 fL Final    Neutrophils-Polys 08/26/2022 53.90  44.00 - 72.00 % Final    Lymphocytes 08/26/2022 34.90  22.00 - 41.00 % Final    Monocytes 08/26/2022 6.90  0.00 - 13.40 % Final    Eosinophils 08/26/2022 2.80  0.00 - 6.90 % Final    Basophils 08/26/2022 1.20  0.00 - 1.80 % Final    Immature Granulocytes 08/26/2022 0.30  0.00 - 0.90 % Final    Nucleated RBC 08/26/2022 0.00  /100 WBC Final    Neutrophils (Absolute) 08/26/2022 3.61  2.00 - 7.15 K/uL Final    Includes immature neutrophils, if present.    Lymphs (Absolute) 08/26/2022 2.34  1.00 - 4.80 K/uL Final    Monos (Absolute) 08/26/2022 0.46  0.00 - 0.85 K/uL Final    Eos (Absolute) 08/26/2022 0.19  0.00 - 0.51 K/uL Final    Baso (Absolute) 08/26/2022 0.08  0.00 - 0.12 K/uL Final    Immature Granulocytes (abs) 08/26/2022 0.02  0.00 - 0.11 K/uL Final    NRBC (Absolute) 08/26/2022 0.00  K/uL Final    Glycohemoglobin 08/26/2022 5.1  4.0 - 5.6 % Final    Comment: Increased risk for diabetes:  5.7 -6.4%  Diabetes:  >6.4%  Glycemic control for adults with diabetes:  <7.0%    The above interpretations are per ADA guidelines.  Diagnosis  of diabetes mellitus on the basis of elevated Hemoglobin A1c  should be confirmed by repeating the Hb A1c test.      Est Avg Glucose 08/26/2022 100  mg/dL Final    Comment: The eAG calculation is based on the A1c-Derived Daily  Glucose  (ADAG) study.  See the ADA's website for additional information.      Sodium 08/26/2022 139  135 - 145 mmol/L Final    Potassium 08/26/2022 4.2  3.6 - 5.5 mmol/L Final    Chloride 08/26/2022 103  96 - 112 mmol/L Final    Co2 08/26/2022 25  20 - 33 mmol/L Final    Anion Gap 08/26/2022 11.0  7.0 - 16.0 Final    Glucose 08/26/2022 90  65 - 99 mg/dL Final    Bun 08/26/2022 12  8 - 22 mg/dL Final    Creatinine 08/26/2022 0.72  0.50 - 1.40 mg/dL Final    Calcium 08/26/2022 9.6  8.5 - 10.5 mg/dL Final    AST(SGOT) 08/26/2022 17  12 - 45 U/L Final    ALT(SGPT) 08/26/2022 16  2 - 50 U/L Final    Alkaline Phosphatase 08/26/2022 60  30 - 99 U/L Final    Total Bilirubin 08/26/2022 0.7  0.1 - 1.5 mg/dL Final    Albumin 08/26/2022 4.7  3.2 - 4.9 g/dL Final    Total Protein 08/26/2022 7.1  6.0 - 8.2 g/dL Final    Globulin 08/26/2022 2.4  1.9 - 3.5 g/dL Final    A-G Ratio 08/26/2022 2.0  g/dL Final    Cholesterol,Tot 08/26/2022 216 (A) 100 - 199 mg/dL Final    Triglycerides 08/26/2022 220 (A) 0 - 149 mg/dL Final    HDL 08/26/2022 63  >=40 mg/dL Final    LDL 08/26/2022 109 (A) <100 mg/dL Final    Fasting Status 08/26/2022 Fasting   Final    GFR (CKD-EPI) 08/26/2022 87  >60 mL/min/1.73 m 2 Final    Comment: Effective 3/15/2022, estimated Glomerular Filtration Rate  is calculated using race neutral CKD-EPI 2021 equation  per NKF-ASN recommendations.                    Assessment & Plan        1. White coat syndrome without hypertension  Blood pressure runs high here in the office but runs very good at home.  She will continue to monitor it at home every other day/few days a week.    2. Dyslipidemia  The LDL is now slightly elevated 109 and previously was below 100.  She still has high HDL level which is at 63.  She still has high triglycerides but advised to avoid sweets and decrease the carbs and avoid fatty foods to improve her numbers.    3. Impaired fasting blood sugar  Fasting blood sugar and hemoglobin A1c are both  normal.  Continue avoidance of sweets and decreasing the carbs.    4. Vitamin D deficiency  Adequately replaced.  Continue same dose of vitamin D supplementation.    5. Osteopenia of multiple sites  Continue calcium and vitamin D supplementation.    She will establish care with another PCP as I am relocating to [place].        Please note that this dictation was created using voice recognition software. I have worked with consultants from the vendor as well as technical experts from [facility] to optimize the interface. I have made every reasonable attempt to correct obvious errors, but I expect that there are errors of grammar and possibly content I did not discover before finalizing the note.

## 2023-02-06 SDOH — ECONOMIC STABILITY: FOOD INSECURITY: WITHIN THE PAST 12 MONTHS, YOU WORRIED THAT YOUR FOOD WOULD RUN OUT BEFORE YOU GOT MONEY TO BUY MORE.: PATIENT DECLINED

## 2023-02-06 SDOH — ECONOMIC STABILITY: TRANSPORTATION INSECURITY
IN THE PAST 12 MONTHS, HAS LACK OF RELIABLE TRANSPORTATION KEPT YOU FROM MEDICAL APPOINTMENTS, MEETINGS, WORK OR FROM GETTING THINGS NEEDED FOR DAILY LIVING?: NO

## 2023-02-06 SDOH — ECONOMIC STABILITY: HOUSING INSECURITY
IN THE LAST 12 MONTHS, WAS THERE A TIME WHEN YOU DID NOT HAVE A STEADY PLACE TO SLEEP OR SLEPT IN A SHELTER (INCLUDING NOW)?: NO

## 2023-02-06 SDOH — ECONOMIC STABILITY: INCOME INSECURITY: HOW HARD IS IT FOR YOU TO PAY FOR THE VERY BASICS LIKE FOOD, HOUSING, MEDICAL CARE, AND HEATING?: NOT HARD AT ALL

## 2023-02-06 SDOH — HEALTH STABILITY: PHYSICAL HEALTH: ON AVERAGE, HOW MANY MINUTES DO YOU ENGAGE IN EXERCISE AT THIS LEVEL?: 20 MIN

## 2023-02-06 SDOH — ECONOMIC STABILITY: TRANSPORTATION INSECURITY
IN THE PAST 12 MONTHS, HAS LACK OF TRANSPORTATION KEPT YOU FROM MEETINGS, WORK, OR FROM GETTING THINGS NEEDED FOR DAILY LIVING?: NO

## 2023-02-06 SDOH — ECONOMIC STABILITY: FOOD INSECURITY: WITHIN THE PAST 12 MONTHS, THE FOOD YOU BOUGHT JUST DIDN'T LAST AND YOU DIDN'T HAVE MONEY TO GET MORE.: PATIENT DECLINED

## 2023-02-06 SDOH — ECONOMIC STABILITY: HOUSING INSECURITY: IN THE LAST 12 MONTHS, HOW MANY PLACES HAVE YOU LIVED?: 1

## 2023-02-06 SDOH — ECONOMIC STABILITY: TRANSPORTATION INSECURITY
IN THE PAST 12 MONTHS, HAS THE LACK OF TRANSPORTATION KEPT YOU FROM MEDICAL APPOINTMENTS OR FROM GETTING MEDICATIONS?: NO

## 2023-02-06 SDOH — HEALTH STABILITY: PHYSICAL HEALTH: ON AVERAGE, HOW MANY DAYS PER WEEK DO YOU ENGAGE IN MODERATE TO STRENUOUS EXERCISE (LIKE A BRISK WALK)?: 2 DAYS

## 2023-02-06 SDOH — ECONOMIC STABILITY: HOUSING INSECURITY
IN THE LAST 12 MONTHS, WAS THERE A TIME WHEN YOU DID NOT HAVE A STEADY PLACE TO SLEEP OR SLEPT IN A SHELTER (INCLUDING NOW)?: PATIENT REFUSED

## 2023-02-06 SDOH — HEALTH STABILITY: MENTAL HEALTH
STRESS IS WHEN SOMEONE FEELS TENSE, NERVOUS, ANXIOUS, OR CAN'T SLEEP AT NIGHT BECAUSE THEIR MIND IS TROUBLED. HOW STRESSED ARE YOU?: ONLY A LITTLE

## 2023-02-06 SDOH — ECONOMIC STABILITY: INCOME INSECURITY: IN THE LAST 12 MONTHS, WAS THERE A TIME WHEN YOU WERE NOT ABLE TO PAY THE MORTGAGE OR RENT ON TIME?: PATIENT REFUSED

## 2023-02-06 ASSESSMENT — SOCIAL DETERMINANTS OF HEALTH (SDOH)
IN A TYPICAL WEEK, HOW MANY TIMES DO YOU TALK ON THE PHONE WITH FAMILY, FRIENDS, OR NEIGHBORS?: THREE TIMES A WEEK
HOW OFTEN DO YOU GET TOGETHER WITH FRIENDS OR RELATIVES?: ONCE A WEEK
WITHIN THE PAST 12 MONTHS, YOU WORRIED THAT YOUR FOOD WOULD RUN OUT BEFORE YOU GOT THE MONEY TO BUY MORE: PATIENT DECLINED
HOW OFTEN DO YOU HAVE SIX OR MORE DRINKS ON ONE OCCASION: NEVER
HOW HARD IS IT FOR YOU TO PAY FOR THE VERY BASICS LIKE FOOD, HOUSING, MEDICAL CARE, AND HEATING?: NOT HARD AT ALL
HOW OFTEN DO YOU GET TOGETHER WITH FRIENDS OR RELATIVES?: ONCE A WEEK
DO YOU BELONG TO ANY CLUBS OR ORGANIZATIONS SUCH AS CHURCH GROUPS UNIONS, FRATERNAL OR ATHLETIC GROUPS, OR SCHOOL GROUPS?: NO
HOW OFTEN DO YOU ATTENT MEETINGS OF THE CLUB OR ORGANIZATION YOU BELONG TO?: NEVER
HOW OFTEN DO YOU ATTENT MEETINGS OF THE CLUB OR ORGANIZATION YOU BELONG TO?: NEVER
HOW MANY DRINKS CONTAINING ALCOHOL DO YOU HAVE ON A TYPICAL DAY WHEN YOU ARE DRINKING: 1 OR 2
DO YOU BELONG TO ANY CLUBS OR ORGANIZATIONS SUCH AS CHURCH GROUPS UNIONS, FRATERNAL OR ATHLETIC GROUPS, OR SCHOOL GROUPS?: NO
HOW OFTEN DO YOU HAVE A DRINK CONTAINING ALCOHOL: 2-3 TIMES A WEEK
HOW OFTEN DO YOU ATTEND CHURCH OR RELIGIOUS SERVICES?: NEVER
IN A TYPICAL WEEK, HOW MANY TIMES DO YOU TALK ON THE PHONE WITH FAMILY, FRIENDS, OR NEIGHBORS?: THREE TIMES A WEEK
HOW OFTEN DO YOU ATTEND CHURCH OR RELIGIOUS SERVICES?: NEVER

## 2023-02-06 ASSESSMENT — LIFESTYLE VARIABLES
HOW OFTEN DO YOU HAVE SIX OR MORE DRINKS ON ONE OCCASION: NEVER
HOW MANY STANDARD DRINKS CONTAINING ALCOHOL DO YOU HAVE ON A TYPICAL DAY: 1 OR 2
AUDIT-C TOTAL SCORE: 3
SKIP TO QUESTIONS 9-10: 1
HOW OFTEN DO YOU HAVE A DRINK CONTAINING ALCOHOL: 2-3 TIMES A WEEK

## 2023-02-09 ENCOUNTER — OFFICE VISIT (OUTPATIENT)
Dept: MEDICAL GROUP | Facility: PHYSICIAN GROUP | Age: 78
End: 2023-02-09
Payer: MEDICARE

## 2023-02-09 VITALS
SYSTOLIC BLOOD PRESSURE: 150 MMHG | DIASTOLIC BLOOD PRESSURE: 90 MMHG | TEMPERATURE: 97.1 F | BODY MASS INDEX: 22.46 KG/M2 | WEIGHT: 114.4 LBS | OXYGEN SATURATION: 96 % | HEIGHT: 60 IN | HEART RATE: 66 BPM

## 2023-02-09 DIAGNOSIS — R03.0 ELEVATED BP WITHOUT DIAGNOSIS OF HYPERTENSION: ICD-10-CM

## 2023-02-09 DIAGNOSIS — D75.89 MACROCYTOSIS WITHOUT ANEMIA: ICD-10-CM

## 2023-02-09 DIAGNOSIS — E78.5 DYSLIPIDEMIA: ICD-10-CM

## 2023-02-09 DIAGNOSIS — M81.0 AGE-RELATED OSTEOPOROSIS WITHOUT CURRENT PATHOLOGICAL FRACTURE: ICD-10-CM

## 2023-02-09 DIAGNOSIS — Z12.31 ENCOUNTER FOR SCREENING MAMMOGRAM FOR MALIGNANT NEOPLASM OF BREAST: ICD-10-CM

## 2023-02-09 PROCEDURE — 99214 OFFICE O/P EST MOD 30 MIN: CPT

## 2023-02-09 ASSESSMENT — ENCOUNTER SYMPTOMS
WEIGHT LOSS: 0
DIARRHEA: 0
CONSTIPATION: 0
MYALGIAS: 0
WEAKNESS: 0
ABDOMINAL PAIN: 0
SHORTNESS OF BREATH: 0
FEVER: 0
CHILLS: 0
BLURRED VISION: 0
VOMITING: 0
HEADACHES: 0
NAUSEA: 0
COUGH: 0
DIZZINESS: 0

## 2023-02-09 ASSESSMENT — FIBROSIS 4 INDEX: FIB4 SCORE: 1.19

## 2023-02-09 ASSESSMENT — PATIENT HEALTH QUESTIONNAIRE - PHQ9: CLINICAL INTERPRETATION OF PHQ2 SCORE: 0

## 2023-02-09 NOTE — ASSESSMENT & PLAN NOTE
Chronic conditions stable  - We will repeat CBC with differential need to monitor  -Recommend avoidance of excessive alcohol commend B vitamin along with folate

## 2023-02-09 NOTE — ASSESSMENT & PLAN NOTE
- Chronic condition  -Recommend continuation of home blood pressure monitoring.  Should levels remain greater than 140/90 return to clinic to discuss medication management

## 2023-02-09 NOTE — ASSESSMENT & PLAN NOTE
Chronic condition uncontrolled  -Recommend calcium citrate 1200 mg daily along with vitamin D3 2000 IUs daily  -Recommend weightbearing exercise  -Repeat DEXA scan  -Fall precautions discussed

## 2023-02-09 NOTE — PROGRESS NOTES
Subjective:     CC:  Diagnoses of Dyslipidemia, Elevated BP without diagnosis of hypertension, Age-related osteoporosis without current pathological fracture, Encounter for screening mammogram for malignant neoplasm of breast, and Macrocytosis without anemia were pertinent to this visit.    HISTORY OF THE PRESENT ILLNESS: Patient is a 77 y.o. female. This pleasant patient is here today to establish care and discuss the following problems:    Problem   Elevated Bp Without Diagnosis of Hypertension    120-140/70-80 at home.  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  -Denies CP, SOB, blurred vision, dizziness     Macrocytosis Without Anemia    Patient with history of macrocytosis without evidence of anemia  -She denies any current symptomology     Dyslipidemia    Chronic, not on medication  Reports healthy diet  Exercise: walking    Latest Reference Range & Units 08/26/22 06:02   Cholesterol,Tot 100 - 199 mg/dL 216 (H)   Triglycerides 0 - 149 mg/dL 220 (H)   HDL >=40 mg/dL 63   LDL <100 mg/dL 109 (H)        Osteoporosis    2021 Dexa shows:  The mean bone mineral density for the lumbar spine is 0.879 g/cm2, which corresponds to a T score of -2.4 and a Z score of -0.3.     The proximal left femur has a mean bone mineral density of 0.671 g/cm2, with a T score of -2.7 and a Z score of -0.7.   When compared with the most recent study dated 4/19/2018, there has been a 0.8% decrease in the bone mineral density of the lumbar spine and a 4.1% decrease in the bone mineral density of the proximal left femur.            Health Maintenance: Completed recommend annual wellness and COVID booster    ROS:   Review of Systems   Constitutional:  Negative for chills, fever, malaise/fatigue and weight loss.   Eyes:  Negative for blurred vision.   Respiratory:  Negative for cough and shortness of breath.    Cardiovascular:  Negative for chest pain.   Gastrointestinal:  Negative for abdominal pain, constipation, diarrhea, nausea and vomiting.    Musculoskeletal:  Negative for myalgias.   Neurological:  Negative for dizziness, weakness and headaches.       Objective:     Exam: BP (!) 150/90 (BP Location: Left arm, Patient Position: Sitting, BP Cuff Size: Adult)   Pulse 66   Temp 36.2 °C (97.1 °F) (Temporal)   Ht 1.524 m (5')   Wt 51.9 kg (114 lb 6.4 oz)   SpO2 96%  Body mass index is 22.34 kg/m².    Physical Exam  Constitutional:       General: She is not in acute distress.     Appearance: Normal appearance. She is not ill-appearing or toxic-appearing.   HENT:      Head: Normocephalic.   Eyes:      Conjunctiva/sclera: Conjunctivae normal.   Cardiovascular:      Rate and Rhythm: Normal rate and regular rhythm.      Pulses: Normal pulses.      Heart sounds: Normal heart sounds. No murmur heard.  Pulmonary:      Effort: Pulmonary effort is normal. No respiratory distress.      Breath sounds: Normal breath sounds.   Skin:     General: Skin is warm and dry.   Neurological:      General: No focal deficit present.      Mental Status: She is alert and oriented to person, place, and time.   Psychiatric:         Mood and Affect: Mood normal.         Behavior: Behavior normal.         Labs:   No visits with results within 1 Month(s) from this visit.   Latest known visit with results is:   Hospital Outpatient Visit on 08/26/2022   Component Date Value    25-Hydroxy   Vitamin D 25 08/26/2022 51     WBC 08/26/2022 6.7     RBC 08/26/2022 4.44     Hemoglobin 08/26/2022 14.7     Hematocrit 08/26/2022 44.1     MCV 08/26/2022 99.3 (H)     MCH 08/26/2022 33.1 (H)     MCHC 08/26/2022 33.3 (L)     RDW 08/26/2022 45.9     Platelet Count 08/26/2022 268     MPV 08/26/2022 11.6     Neutrophils-Polys 08/26/2022 53.90     Lymphocytes 08/26/2022 34.90     Monocytes 08/26/2022 6.90     Eosinophils 08/26/2022 2.80     Basophils 08/26/2022 1.20     Immature Granulocytes 08/26/2022 0.30     Nucleated RBC 08/26/2022 0.00     Neutrophils (Absolute) 08/26/2022 3.61     Lymphs (Absolute)  08/26/2022 2.34     Monos (Absolute) 08/26/2022 0.46     Eos (Absolute) 08/26/2022 0.19     Baso (Absolute) 08/26/2022 0.08     Immature Granulocytes (a* 08/26/2022 0.02     NRBC (Absolute) 08/26/2022 0.00     Glycohemoglobin 08/26/2022 5.1     Est Avg Glucose 08/26/2022 100     Sodium 08/26/2022 139     Potassium 08/26/2022 4.2     Chloride 08/26/2022 103     Co2 08/26/2022 25     Anion Gap 08/26/2022 11.0     Glucose 08/26/2022 90     Bun 08/26/2022 12     Creatinine 08/26/2022 0.72     Calcium 08/26/2022 9.6     AST(SGOT) 08/26/2022 17     ALT(SGPT) 08/26/2022 16     Alkaline Phosphatase 08/26/2022 60     Total Bilirubin 08/26/2022 0.7     Albumin 08/26/2022 4.7     Total Protein 08/26/2022 7.1     Globulin 08/26/2022 2.4     A-G Ratio 08/26/2022 2.0     Cholesterol,Tot 08/26/2022 216 (H)     Triglycerides 08/26/2022 220 (H)     HDL 08/26/2022 63     LDL 08/26/2022 109 (H)     Fasting Status 08/26/2022 Fasting     GFR (CKD-EPI) 08/26/2022 87          Assessment & Plan: Medical Decision Making   77 y.o. female with the following -    Problem List Items Addressed This Visit       Osteoporosis     Chronic condition uncontrolled  -Recommend calcium citrate 1200 mg daily along with vitamin D3 2000 IUs daily  -Recommend weightbearing exercise  -Repeat DEXA scan  -Fall precautions discussed         Relevant Orders    CBC WITH DIFFERENTIAL    Comp Metabolic Panel    Lipid Profile    Dyslipidemia     Chronic condition, uncontrolled  -The 10-year ASCVD risk score (Marciano DK, et al., 2019) is: 29.8%   -Declines statins  -Will continue to eat healthy           Relevant Orders    CBC WITH DIFFERENTIAL    Comp Metabolic Panel    Lipid Profile    Elevated BP without diagnosis of hypertension     - Chronic condition  -Recommend continuation of home blood pressure monitoring.  Should levels remain greater than 140/90 return to clinic to discuss medication management         Relevant Orders    CBC WITH DIFFERENTIAL    Comp  Metabolic Panel    Lipid Profile    Macrocytosis without anemia     Chronic conditions stable  - We will repeat CBC with differential need to monitor  -Recommend avoidance of excessive alcohol commend B vitamin along with folate         Relevant Orders    CBC WITH DIFFERENTIAL    Comp Metabolic Panel    Lipid Profile     Other Visit Diagnoses       Encounter for screening mammogram for malignant neoplasm of breast        Relevant Orders    MA-SCREENING MAMMO BILAT W/TOMOSYNTHESIS W/CAD            Differential diagnosis, natural history, supportive care, and indications for immediate follow-up discussed.  Shared decision making approach was utilized, and patient is amendable with plan of care.  Patient understands to return to clinic or go to the emergency department if symptoms worsen. All questions and concerns addressed.      Return in about 6 months (around 8/9/2023) for F/U Lab Review.    Please note that this dictation was created using voice recognition software. I have made every reasonable attempt to correct obvious errors, but I expect that there are errors of grammar and possibly content that I did not discover before finalizing the note.

## 2023-02-09 NOTE — ASSESSMENT & PLAN NOTE
Chronic condition, uncontrolled  -The 10-year ASCVD risk score (Marciano DK, et al., 2019) is: 29.8%   -Declines statins  -Will continue to eat healthy

## 2023-02-10 ENCOUNTER — HOSPITAL ENCOUNTER (OUTPATIENT)
Dept: RADIOLOGY | Facility: MEDICAL CENTER | Age: 78
End: 2023-02-10
Payer: MEDICARE

## 2023-02-10 DIAGNOSIS — Z12.31 ENCOUNTER FOR SCREENING MAMMOGRAM FOR MALIGNANT NEOPLASM OF BREAST: ICD-10-CM

## 2023-02-10 PROCEDURE — 77063 BREAST TOMOSYNTHESIS BI: CPT

## 2023-05-26 ENCOUNTER — DOCUMENTATION (OUTPATIENT)
Dept: HEALTH INFORMATION MANAGEMENT | Facility: OTHER | Age: 78
End: 2023-05-26
Payer: MEDICARE

## 2023-05-26 ENCOUNTER — PATIENT MESSAGE (OUTPATIENT)
Dept: HEALTH INFORMATION MANAGEMENT | Facility: OTHER | Age: 78
End: 2023-05-26

## 2023-06-06 ENCOUNTER — TELEPHONE (OUTPATIENT)
Dept: HEALTH INFORMATION MANAGEMENT | Facility: OTHER | Age: 78
End: 2023-06-06
Payer: MEDICARE

## 2023-08-03 ENCOUNTER — HOSPITAL ENCOUNTER (OUTPATIENT)
Dept: LAB | Facility: MEDICAL CENTER | Age: 78
End: 2023-08-03
Payer: MEDICARE

## 2023-08-03 DIAGNOSIS — R03.0 ELEVATED BP WITHOUT DIAGNOSIS OF HYPERTENSION: ICD-10-CM

## 2023-08-03 DIAGNOSIS — E78.5 DYSLIPIDEMIA: ICD-10-CM

## 2023-08-03 DIAGNOSIS — D75.89 MACROCYTOSIS WITHOUT ANEMIA: ICD-10-CM

## 2023-08-03 DIAGNOSIS — M81.0 AGE-RELATED OSTEOPOROSIS WITHOUT CURRENT PATHOLOGICAL FRACTURE: ICD-10-CM

## 2023-08-03 LAB
ALBUMIN SERPL BCP-MCNC: 4.4 G/DL (ref 3.2–4.9)
ALBUMIN/GLOB SERPL: 1.7 G/DL
ALP SERPL-CCNC: 71 U/L (ref 30–99)
ALT SERPL-CCNC: 18 U/L (ref 2–50)
ANION GAP SERPL CALC-SCNC: 10 MMOL/L (ref 7–16)
AST SERPL-CCNC: 26 U/L (ref 12–45)
BASOPHILS # BLD AUTO: 1.2 % (ref 0–1.8)
BASOPHILS # BLD: 0.09 K/UL (ref 0–0.12)
BILIRUB SERPL-MCNC: 0.5 MG/DL (ref 0.1–1.5)
BUN SERPL-MCNC: 15 MG/DL (ref 8–22)
CALCIUM ALBUM COR SERPL-MCNC: 9.2 MG/DL (ref 8.5–10.5)
CALCIUM SERPL-MCNC: 9.5 MG/DL (ref 8.5–10.5)
CHLORIDE SERPL-SCNC: 105 MMOL/L (ref 96–112)
CHOLEST SERPL-MCNC: 210 MG/DL (ref 100–199)
CO2 SERPL-SCNC: 25 MMOL/L (ref 20–33)
CREAT SERPL-MCNC: 0.69 MG/DL (ref 0.5–1.4)
EOSINOPHIL # BLD AUTO: 0.2 K/UL (ref 0–0.51)
EOSINOPHIL NFR BLD: 2.6 % (ref 0–6.9)
ERYTHROCYTE [DISTWIDTH] IN BLOOD BY AUTOMATED COUNT: 45.5 FL (ref 35.9–50)
FASTING STATUS PATIENT QL REPORTED: NORMAL
GFR SERPLBLD CREATININE-BSD FMLA CKD-EPI: 90 ML/MIN/1.73 M 2
GLOBULIN SER CALC-MCNC: 2.6 G/DL (ref 1.9–3.5)
GLUCOSE SERPL-MCNC: 91 MG/DL (ref 65–99)
HCT VFR BLD AUTO: 46.6 % (ref 37–47)
HDLC SERPL-MCNC: 58 MG/DL
HGB BLD-MCNC: 15.3 G/DL (ref 12–16)
IMM GRANULOCYTES # BLD AUTO: 0.01 K/UL (ref 0–0.11)
IMM GRANULOCYTES NFR BLD AUTO: 0.1 % (ref 0–0.9)
LDLC SERPL CALC-MCNC: 116 MG/DL
LYMPHOCYTES # BLD AUTO: 2.82 K/UL (ref 1–4.8)
LYMPHOCYTES NFR BLD: 36.5 % (ref 22–41)
MCH RBC QN AUTO: 32.8 PG (ref 27–33)
MCHC RBC AUTO-ENTMCNC: 32.8 G/DL (ref 32.2–35.5)
MCV RBC AUTO: 99.8 FL (ref 81.4–97.8)
MONOCYTES # BLD AUTO: 0.57 K/UL (ref 0–0.85)
MONOCYTES NFR BLD AUTO: 7.4 % (ref 0–13.4)
NEUTROPHILS # BLD AUTO: 4.03 K/UL (ref 1.82–7.42)
NEUTROPHILS NFR BLD: 52.2 % (ref 44–72)
NRBC # BLD AUTO: 0 K/UL
NRBC BLD-RTO: 0 /100 WBC (ref 0–0.2)
PLATELET # BLD AUTO: 214 K/UL (ref 164–446)
PMV BLD AUTO: 11.9 FL (ref 9–12.9)
POTASSIUM SERPL-SCNC: 4.2 MMOL/L (ref 3.6–5.5)
PROT SERPL-MCNC: 7 G/DL (ref 6–8.2)
RBC # BLD AUTO: 4.67 M/UL (ref 4.2–5.4)
SODIUM SERPL-SCNC: 140 MMOL/L (ref 135–145)
TRIGL SERPL-MCNC: 178 MG/DL (ref 0–149)
WBC # BLD AUTO: 7.7 K/UL (ref 4.8–10.8)

## 2023-08-03 PROCEDURE — 36415 COLL VENOUS BLD VENIPUNCTURE: CPT

## 2023-08-03 PROCEDURE — 80053 COMPREHEN METABOLIC PANEL: CPT

## 2023-08-03 PROCEDURE — 80061 LIPID PANEL: CPT

## 2023-08-03 PROCEDURE — 85025 COMPLETE CBC W/AUTO DIFF WBC: CPT

## 2023-08-09 ENCOUNTER — OFFICE VISIT (OUTPATIENT)
Dept: MEDICAL GROUP | Facility: PHYSICIAN GROUP | Age: 78
End: 2023-08-09
Payer: MEDICARE

## 2023-08-09 VITALS
TEMPERATURE: 97.9 F | BODY MASS INDEX: 22.38 KG/M2 | WEIGHT: 114 LBS | HEART RATE: 57 BPM | SYSTOLIC BLOOD PRESSURE: 140 MMHG | DIASTOLIC BLOOD PRESSURE: 60 MMHG | OXYGEN SATURATION: 96 % | HEIGHT: 60 IN

## 2023-08-09 DIAGNOSIS — R03.0 ELEVATED BP WITHOUT DIAGNOSIS OF HYPERTENSION: ICD-10-CM

## 2023-08-09 DIAGNOSIS — R22.32 MASS OF LEFT UPPER EXTREMITY: ICD-10-CM

## 2023-08-09 DIAGNOSIS — D75.89 MACROCYTOSIS WITHOUT ANEMIA: ICD-10-CM

## 2023-08-09 DIAGNOSIS — E78.5 DYSLIPIDEMIA: ICD-10-CM

## 2023-08-09 DIAGNOSIS — M81.0 AGE-RELATED OSTEOPOROSIS WITHOUT CURRENT PATHOLOGICAL FRACTURE: ICD-10-CM

## 2023-08-09 PROCEDURE — 3077F SYST BP >= 140 MM HG: CPT

## 2023-08-09 PROCEDURE — 99214 OFFICE O/P EST MOD 30 MIN: CPT

## 2023-08-09 PROCEDURE — 3078F DIAST BP <80 MM HG: CPT

## 2023-08-09 RX ORDER — BENZOCAINE/MENTHOL 6 MG-10 MG
1 LOZENGE MUCOUS MEMBRANE 2 TIMES DAILY
Qty: 30 G | Refills: 0 | Status: SHIPPED | OUTPATIENT
Start: 2023-08-09

## 2023-08-09 ASSESSMENT — ENCOUNTER SYMPTOMS
CONSTIPATION: 0
ABDOMINAL PAIN: 0
VOMITING: 0
NAUSEA: 0
CHILLS: 0
BLURRED VISION: 0
WEAKNESS: 0
SHORTNESS OF BREATH: 0
MYALGIAS: 0
COUGH: 0
FEVER: 0
HEADACHES: 0
WEIGHT LOSS: 0
DIARRHEA: 0
DIZZINESS: 0

## 2023-08-09 ASSESSMENT — FIBROSIS 4 INDEX: FIB4 SCORE: 2.15

## 2023-08-09 NOTE — ASSESSMENT & PLAN NOTE
Chronic condition uncontrolled  -Recommend calcium citrate 1200 mg daily along with vitamin D3 2000 IUs daily. Declines Biphosponates   -Recommend weightbearing exercise  -Repeat DEXA scan at some point in the next year  -Fall precautions discussed

## 2023-08-09 NOTE — ASSESSMENT & PLAN NOTE
Undiagnosed condition of uncertain prognosis  -US ordered to do if condition fails to resolve in 1-2 weeks  -Given pruritic nature swelling, I am suspicious for insect bite. Hydrocortisone cream can be applied twice daily. Discussed only using for the minimal amount of time necessary as steroids can cause skin thinning and atrophy.

## 2023-08-09 NOTE — ASSESSMENT & PLAN NOTE
Chronic, stable  Recommend supplementation with B12 and eating a diet rich in sources of B12 as discussed (clams, tuna, herring, mackeral, fortified cereal)  -Recheck CBC at next lab

## 2023-08-09 NOTE — ASSESSMENT & PLAN NOTE
Chronic condition, uncontrolled  The 10-year ASCVD risk score (Marciano DK, et al., 2019) is: 26.5%  -Declines statins  -Will continue to eat healthy

## 2023-08-09 NOTE — ASSESSMENT & PLAN NOTE
Chronic  Blood pressure is 140/60 in the office today.   Recommend home blood pressure monitoring:  - check your blood pressure every day and put it in a log  - pick a different time each day so we can see how it varies throughout the day  - when you check your blood pressure: make sure you sit quietly for 5-10 minutes beforehand, keep both feet flat on the ground, and make sure you use an arm cuff at heart height    Lifestyle factors to help manage blood pressure:  1) reduce stress with daily activity, consider yoga, or meditation  2) reduce Na to <2000 mg/day-avoid putting extra salt on food, avoid packaged/processed foods, avoid excessive sugar intake  3) Mediterranean diet   4) 30 minutes of cardio and resistance training most days of the week, which you can build up to at least 150 min./week

## 2023-08-09 NOTE — PROGRESS NOTES
Subjective:     CC: lab follow up, left arm itching    HPI:   Karla presents today with    Problem   Mass of Left Upper Extremity    5x5 cm fluctuant soft tissue mass to LUE lateral humerous just below the deltoid. Patient first noticed over a year ago after receiving her Covid shot by [employer]. She reports it swelled at the time of injection however resolved. But in the past few weeks this has showed reoccurrence with itching. She denies pain at site of concern. She denies fever, chills, fatigue, weight loss, or loss of appetite. No recent injections or vaccines.      Elevated Bp Without Diagnosis of Hypertension    BP today is 140/60  110s/60s at home.  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  -Denies CP, SOB, blurred vision, dizziness     Macrocytosis Without Anemia    Patient with history of macrocytosis without evidence of anemia  -She denies any current symptomology  -Does not eat a lot of meat other than fish     Dyslipidemia    Chronic, not on medication  Reports healthy diet  Exercise: walking   Lab Results   Component Value Date/Time    CHOLSTRLTOT 210 (H) 08/03/2023 06:05 AM    CHOLSTRLTOT 216 (H) 08/26/2022 06:02 AM     (H) 08/03/2023 06:05 AM     (H) 08/26/2022 06:02 AM    HDL 58 08/03/2023 06:05 AM    HDL 63 08/26/2022 06:02 AM    TRIGLYCERIDE 178 (H) 08/03/2023 06:05 AM    TRIGLYCERIDE 220 (H) 08/26/2022 06:02 AM            Osteoporosis    Currently supplementing with Calcium and Vit D,    2021 Dexa shows:  The mean bone mineral density for the lumbar spine is 0.879 g/cm2, which corresponds to a T score of -2.4 and a Z score of -0.3.   The proximal left femur has a mean bone mineral density of 0.671 g/cm2, with a T score of -2.7 and a Z score of -0.7.   When compared with the most recent study dated 4/19/2018, there has been a 0.8% decrease in the bone mineral density of the lumbar spine and a 4.1% decrease in the bone mineral density of the proximal left femur.            Health Maintenance:  Completed    ROS:  Review of Systems   Constitutional:  Negative for chills, fever, malaise/fatigue and weight loss.   Eyes:  Negative for blurred vision.   Respiratory:  Negative for cough and shortness of breath.    Cardiovascular:  Negative for chest pain.   Gastrointestinal:  Negative for abdominal pain, constipation, diarrhea, nausea and vomiting.   Musculoskeletal:  Negative for myalgias.   Skin:  Positive for itching.   Neurological:  Negative for dizziness, weakness and headaches.       Objective:     Exam:  BP (!) 140/60 (BP Location: Left arm, Patient Position: Sitting, BP Cuff Size: Adult)   Pulse (!) 57   Temp 36.6 °C (97.9 °F) (Temporal)   Ht 1.524 m (5')   Wt 51.7 kg (114 lb)   SpO2 96%   BMI 22.26 kg/m²  Body mass index is 22.26 kg/m².    Physical Exam  Constitutional:       General: She is not in acute distress.     Appearance: Normal appearance. She is not ill-appearing or toxic-appearing.   HENT:      Head: Normocephalic.   Eyes:      Conjunctiva/sclera: Conjunctivae normal.   Cardiovascular:      Rate and Rhythm: Normal rate and regular rhythm.      Pulses: Normal pulses.      Heart sounds: Normal heart sounds. No murmur heard.  Pulmonary:      Effort: Pulmonary effort is normal. No respiratory distress.      Breath sounds: Normal breath sounds.   Musculoskeletal:         General: Swelling (LUE 5X5 cm area of mild swelling) present. No signs of injury.   Skin:     General: Skin is warm and dry.   Neurological:      General: No focal deficit present.      Mental Status: She is alert and oriented to person, place, and time.   Psychiatric:         Mood and Affect: Mood normal.         Behavior: Behavior normal.         Labs:    Latest Reference Range & Units 08/03/23 06:05   WBC 4.8 - 10.8 K/uL 7.7   RBC 4.20 - 5.40 M/uL 4.67   Hemoglobin 12.0 - 16.0 g/dL 15.3   Hematocrit 37.0 - 47.0 % 46.6   MCV 81.4 - 97.8 fL 99.8 (H)   MCH 27.0 - 33.0 pg 32.8   MCHC 32.2 - 35.5 g/dL 32.8   RDW 35.9 - 50.0  fL 45.5   Platelet Count 164 - 446 K/uL 214   MPV 9.0 - 12.9 fL 11.9   Neutrophils-Polys 44.00 - 72.00 % 52.20   Neutrophils (Absolute) 1.82 - 7.42 K/uL 4.03   Lymphocytes 22.00 - 41.00 % 36.50   Lymphs (Absolute) 1.00 - 4.80 K/uL 2.82   Monocytes 0.00 - 13.40 % 7.40   Monos (Absolute) 0.00 - 0.85 K/uL 0.57   Eosinophils 0.00 - 6.90 % 2.60   Eos (Absolute) 0.00 - 0.51 K/uL 0.20   Basophils 0.00 - 1.80 % 1.20   Baso (Absolute) 0.00 - 0.12 K/uL 0.09   Immature Granulocytes 0.00 - 0.90 % 0.10   Immature Granulocytes (abs) 0.00 - 0.11 K/uL 0.01   Nucleated RBC 0.00 - 0.20 /100 WBC 0.00   NRBC (Absolute) K/uL 0.00   Sodium 135 - 145 mmol/L 140   Potassium 3.6 - 5.5 mmol/L 4.2   Chloride 96 - 112 mmol/L 105   Co2 20 - 33 mmol/L 25   Anion Gap 7.0 - 16.0  10.0   Glucose 65 - 99 mg/dL 91   Bun 8 - 22 mg/dL 15   Creatinine 0.50 - 1.40 mg/dL 0.69   GFR (CKD-EPI) >60 mL/min/1.73 m 2 90   Calcium 8.5 - 10.5 mg/dL 9.5   Correct Calcium 8.5 - 10.5 mg/dL 9.2   AST(SGOT) 12 - 45 U/L 26   ALT(SGPT) 2 - 50 U/L 18   Alkaline Phosphatase 30 - 99 U/L 71   Total Bilirubin 0.1 - 1.5 mg/dL 0.5   Albumin 3.2 - 4.9 g/dL 4.4   Total Protein 6.0 - 8.2 g/dL 7.0   Globulin 1.9 - 3.5 g/dL 2.6   A-G Ratio g/dL 1.7   Fasting Status  Fasting   Cholesterol,Tot 100 - 199 mg/dL 210 (H)   Triglycerides 0 - 149 mg/dL 178 (H)   HDL >=40 mg/dL 58   LDL <100 mg/dL 116 (H)       Assessment & Plan: Medical Decision Making     77 y.o. female with the following -     Problem List Items Addressed This Visit       Osteoporosis     Chronic condition uncontrolled  -Recommend calcium citrate 1200 mg daily along with vitamin D3 2000 IUs daily. Declines Biphosponates   -Recommend weightbearing exercise  -Repeat DEXA scan at some point in the next year  -Fall precautions discussed         Dyslipidemia     Chronic condition, uncontrolled  The 10-year ASCVD risk score (Marciano COON, et al., 2019) is: 26.5%  -Declines statins  -Will continue to eat healthy           Relevant  Orders    Comp Metabolic Panel    Lipid Profile    Elevated BP without diagnosis of hypertension     Chronic  Blood pressure is 140/60 in the office today.   Recommend home blood pressure monitoring:  - check your blood pressure every day and put it in a log  - pick a different time each day so we can see how it varies throughout the day  - when you check your blood pressure: make sure you sit quietly for 5-10 minutes beforehand, keep both feet flat on the ground, and make sure you use an arm cuff at heart height    Lifestyle factors to help manage blood pressure:  1) reduce stress with daily activity, consider yoga, or meditation  2) reduce Na to <2000 mg/day-avoid putting extra salt on food, avoid packaged/processed foods, avoid excessive sugar intake  3) Mediterranean diet   4) 30 minutes of cardio and resistance training most days of the week, which you can build up to at least 150 min./week                 Macrocytosis without anemia     Chronic, stable  Recommend supplementation with B12 and eating a diet rich in sources of B12 as discussed (clams, tuna, herring, mackeral, fortified cereal)  -Recheck CBC at next lab         Relevant Orders    CBC WITHOUT DIFFERENTIAL    Mass of left upper extremity     Undiagnosed condition of uncertain prognosis  -US ordered to do if condition fails to resolve in 1-2 weeks  -Given pruritic nature swelling, I am suspicious for insect bite. Hydrocortisone cream can be applied twice daily. Discussed only using for the minimal amount of time necessary as steroids can cause skin thinning and atrophy.          Relevant Medications    hydrocortisone 1 % Cream    Other Relevant Orders    US-EXTREMITY NON VASCULAR UNILATERAL LEFT       Differential diagnosis, natural history, supportive care, and indications for immediate follow-up discussed.  Shared decision making approach utilized, and patient is amendable with plan of care.  Patient understands to return to clinic or go to the  emergency department if symptoms worsen. All questions and concerns addressed to the best of my knowledge.    Return in about 6 months (around 2/9/2024), or if symptoms worsen or fail to improve.    Please note that this dictation was created using voice recognition software. I have made every reasonable attempt to correct obvious errors, but I expect that there are errors of grammar and possibly content that I did not discover before finalizing the note.

## 2024-06-12 ENCOUNTER — HOSPITAL ENCOUNTER (OUTPATIENT)
Dept: LAB | Facility: MEDICAL CENTER | Age: 79
End: 2024-06-12
Payer: MEDICARE

## 2024-06-12 DIAGNOSIS — E78.5 DYSLIPIDEMIA: ICD-10-CM

## 2024-06-12 LAB
ALBUMIN SERPL BCP-MCNC: 3.9 G/DL (ref 3.2–4.9)
ALBUMIN/GLOB SERPL: 1.4 G/DL
ALP SERPL-CCNC: 62 U/L (ref 30–99)
ALT SERPL-CCNC: 18 U/L (ref 2–50)
ANION GAP SERPL CALC-SCNC: 10 MMOL/L (ref 7–16)
AST SERPL-CCNC: 18 U/L (ref 12–45)
BILIRUB SERPL-MCNC: 0.4 MG/DL (ref 0.1–1.5)
BUN SERPL-MCNC: 9 MG/DL (ref 8–22)
CALCIUM ALBUM COR SERPL-MCNC: 9.6 MG/DL (ref 8.5–10.5)
CALCIUM SERPL-MCNC: 9.5 MG/DL (ref 8.5–10.5)
CHLORIDE SERPL-SCNC: 104 MMOL/L (ref 96–112)
CHOLEST SERPL-MCNC: 190 MG/DL (ref 100–199)
CO2 SERPL-SCNC: 25 MMOL/L (ref 20–33)
CREAT SERPL-MCNC: 0.72 MG/DL (ref 0.5–1.4)
FASTING STATUS PATIENT QL REPORTED: NORMAL
GFR SERPLBLD CREATININE-BSD FMLA CKD-EPI: 86 ML/MIN/1.73 M 2
GLOBULIN SER CALC-MCNC: 2.7 G/DL (ref 1.9–3.5)
GLUCOSE SERPL-MCNC: 87 MG/DL (ref 65–99)
HDLC SERPL-MCNC: 58 MG/DL
LDLC SERPL CALC-MCNC: 91 MG/DL
POTASSIUM SERPL-SCNC: 4 MMOL/L (ref 3.6–5.5)
PROT SERPL-MCNC: 6.6 G/DL (ref 6–8.2)
SODIUM SERPL-SCNC: 139 MMOL/L (ref 135–145)
TRIGL SERPL-MCNC: 205 MG/DL (ref 0–149)

## 2024-06-12 PROCEDURE — 80053 COMPREHEN METABOLIC PANEL: CPT

## 2024-06-12 PROCEDURE — 80061 LIPID PANEL: CPT

## 2024-06-12 PROCEDURE — 36415 COLL VENOUS BLD VENIPUNCTURE: CPT

## 2024-06-16 SDOH — ECONOMIC STABILITY: INCOME INSECURITY: IN THE LAST 12 MONTHS, WAS THERE A TIME WHEN YOU WERE NOT ABLE TO PAY THE MORTGAGE OR RENT ON TIME?: NO

## 2024-06-16 SDOH — ECONOMIC STABILITY: HOUSING INSECURITY: IN THE LAST 12 MONTHS, HOW MANY PLACES HAVE YOU LIVED?: 1

## 2024-06-16 SDOH — ECONOMIC STABILITY: INCOME INSECURITY: HOW HARD IS IT FOR YOU TO PAY FOR THE VERY BASICS LIKE FOOD, HOUSING, MEDICAL CARE, AND HEATING?: NOT HARD AT ALL

## 2024-06-16 SDOH — HEALTH STABILITY: PHYSICAL HEALTH: ON AVERAGE, HOW MANY DAYS PER WEEK DO YOU ENGAGE IN MODERATE TO STRENUOUS EXERCISE (LIKE A BRISK WALK)?: 3 DAYS

## 2024-06-16 SDOH — HEALTH STABILITY: PHYSICAL HEALTH: ON AVERAGE, HOW MANY MINUTES DO YOU ENGAGE IN EXERCISE AT THIS LEVEL?: 30 MIN

## 2024-06-16 SDOH — ECONOMIC STABILITY: FOOD INSECURITY: WITHIN THE PAST 12 MONTHS, THE FOOD YOU BOUGHT JUST DIDN'T LAST AND YOU DIDN'T HAVE MONEY TO GET MORE.: NEVER TRUE

## 2024-06-16 SDOH — ECONOMIC STABILITY: FOOD INSECURITY: WITHIN THE PAST 12 MONTHS, YOU WORRIED THAT YOUR FOOD WOULD RUN OUT BEFORE YOU GOT MONEY TO BUY MORE.: NEVER TRUE

## 2024-06-16 ASSESSMENT — SOCIAL DETERMINANTS OF HEALTH (SDOH)
HOW OFTEN DO YOU GET TOGETHER WITH FRIENDS OR RELATIVES?: ONCE A WEEK
HOW OFTEN DO YOU HAVE A DRINK CONTAINING ALCOHOL: MONTHLY OR LESS
HOW OFTEN DO YOU ATTEND CHURCH OR RELIGIOUS SERVICES?: NEVER
IN A TYPICAL WEEK, HOW MANY TIMES DO YOU TALK ON THE PHONE WITH FAMILY, FRIENDS, OR NEIGHBORS?: THREE TIMES A WEEK
IN A TYPICAL WEEK, HOW MANY TIMES DO YOU TALK ON THE PHONE WITH FAMILY, FRIENDS, OR NEIGHBORS?: THREE TIMES A WEEK
HOW HARD IS IT FOR YOU TO PAY FOR THE VERY BASICS LIKE FOOD, HOUSING, MEDICAL CARE, AND HEATING?: NOT HARD AT ALL
HOW MANY DRINKS CONTAINING ALCOHOL DO YOU HAVE ON A TYPICAL DAY WHEN YOU ARE DRINKING: 1 OR 2
HOW OFTEN DO YOU ATTENT MEETINGS OF THE CLUB OR ORGANIZATION YOU BELONG TO?: NEVER
DO YOU BELONG TO ANY CLUBS OR ORGANIZATIONS SUCH AS CHURCH GROUPS UNIONS, FRATERNAL OR ATHLETIC GROUPS, OR SCHOOL GROUPS?: NO
HOW OFTEN DO YOU ATTENT MEETINGS OF THE CLUB OR ORGANIZATION YOU BELONG TO?: NEVER
IN A TYPICAL WEEK, HOW MANY TIMES DO YOU TALK ON THE PHONE WITH FAMILY, FRIENDS, OR NEIGHBORS?: THREE TIMES A WEEK
HOW OFTEN DO YOU GET TOGETHER WITH FRIENDS OR RELATIVES?: ONCE A WEEK
HOW OFTEN DO YOU ATTEND CHURCH OR RELIGIOUS SERVICES?: NEVER
WITHIN THE PAST 12 MONTHS, YOU WORRIED THAT YOUR FOOD WOULD RUN OUT BEFORE YOU GOT THE MONEY TO BUY MORE: NEVER TRUE
DO YOU BELONG TO ANY CLUBS OR ORGANIZATIONS SUCH AS CHURCH GROUPS UNIONS, FRATERNAL OR ATHLETIC GROUPS, OR SCHOOL GROUPS?: NO
IN THE PAST 12 MONTHS, HAS THE ELECTRIC, GAS, OIL, OR WATER COMPANY THREATENED TO SHUT OFF SERVICE IN YOUR HOME?: NO
HOW OFTEN DO YOU ATTEND CHURCH OR RELIGIOUS SERVICES?: NEVER
HOW OFTEN DO YOU GET TOGETHER WITH FRIENDS OR RELATIVES?: ONCE A WEEK
DO YOU BELONG TO ANY CLUBS OR ORGANIZATIONS SUCH AS CHURCH GROUPS UNIONS, FRATERNAL OR ATHLETIC GROUPS, OR SCHOOL GROUPS?: NO
HOW OFTEN DO YOU ATTENT MEETINGS OF THE CLUB OR ORGANIZATION YOU BELONG TO?: NEVER
HOW OFTEN DO YOU HAVE SIX OR MORE DRINKS ON ONE OCCASION: NEVER
IN THE PAST 12 MONTHS, HAS THE ELECTRIC, GAS, OIL, OR WATER COMPANY THREATENED TO SHUT OFF SERVICE IN YOUR HOME?: NO

## 2024-06-16 ASSESSMENT — LIFESTYLE VARIABLES
HOW OFTEN DO YOU HAVE A DRINK CONTAINING ALCOHOL: MONTHLY OR LESS
HOW MANY STANDARD DRINKS CONTAINING ALCOHOL DO YOU HAVE ON A TYPICAL DAY: 1 OR 2
AUDIT-C TOTAL SCORE: 1
HOW MANY STANDARD DRINKS CONTAINING ALCOHOL DO YOU HAVE ON A TYPICAL DAY: 1 OR 2
HOW OFTEN DO YOU HAVE A DRINK CONTAINING ALCOHOL: MONTHLY OR LESS
HOW OFTEN DO YOU HAVE SIX OR MORE DRINKS ON ONE OCCASION: NEVER
HOW OFTEN DO YOU HAVE SIX OR MORE DRINKS ON ONE OCCASION: NEVER
SKIP TO QUESTIONS 9-10: 1
AUDIT-C TOTAL SCORE: 1
SKIP TO QUESTIONS 9-10: 1

## 2024-06-17 SDOH — ECONOMIC STABILITY: HOUSING INSECURITY: IN THE LAST 12 MONTHS, HOW MANY PLACES HAVE YOU LIVED?: 1

## 2024-06-17 SDOH — HEALTH STABILITY: PHYSICAL HEALTH: ON AVERAGE, HOW MANY MINUTES DO YOU ENGAGE IN EXERCISE AT THIS LEVEL?: 30 MIN

## 2024-06-17 SDOH — HEALTH STABILITY: PHYSICAL HEALTH: ON AVERAGE, HOW MANY DAYS PER WEEK DO YOU ENGAGE IN MODERATE TO STRENUOUS EXERCISE (LIKE A BRISK WALK)?: 3 DAYS

## 2024-06-17 ASSESSMENT — SOCIAL DETERMINANTS OF HEALTH (SDOH)
IN A TYPICAL WEEK, HOW MANY TIMES DO YOU TALK ON THE PHONE WITH FAMILY, FRIENDS, OR NEIGHBORS?: THREE TIMES A WEEK
HOW OFTEN DO YOU HAVE A DRINK CONTAINING ALCOHOL: MONTHLY OR LESS
HOW MANY DRINKS CONTAINING ALCOHOL DO YOU HAVE ON A TYPICAL DAY WHEN YOU ARE DRINKING: 1 OR 2
HOW OFTEN DO YOU GET TOGETHER WITH FRIENDS OR RELATIVES?: ONCE A WEEK
DO YOU BELONG TO ANY CLUBS OR ORGANIZATIONS SUCH AS CHURCH GROUPS UNIONS, FRATERNAL OR ATHLETIC GROUPS, OR SCHOOL GROUPS?: NO
HOW HARD IS IT FOR YOU TO PAY FOR THE VERY BASICS LIKE FOOD, HOUSING, MEDICAL CARE, AND HEATING?: NOT HARD AT ALL
HOW OFTEN DO YOU HAVE SIX OR MORE DRINKS ON ONE OCCASION: NEVER
HOW OFTEN DO YOU ATTENT MEETINGS OF THE CLUB OR ORGANIZATION YOU BELONG TO?: NEVER
HOW OFTEN DO YOU ATTEND CHURCH OR RELIGIOUS SERVICES?: NEVER
WITHIN THE PAST 12 MONTHS, YOU WORRIED THAT YOUR FOOD WOULD RUN OUT BEFORE YOU GOT THE MONEY TO BUY MORE: NEVER TRUE

## 2024-06-19 ENCOUNTER — APPOINTMENT (OUTPATIENT)
Dept: MEDICAL GROUP | Facility: PHYSICIAN GROUP | Age: 79
End: 2024-06-19
Payer: MEDICARE

## 2024-08-13 ENCOUNTER — APPOINTMENT (OUTPATIENT)
Dept: MEDICAL GROUP | Facility: PHYSICIAN GROUP | Age: 79
End: 2024-08-13
Payer: MEDICARE

## 2024-08-13 VITALS
OXYGEN SATURATION: 99 % | HEIGHT: 62 IN | DIASTOLIC BLOOD PRESSURE: 80 MMHG | HEART RATE: 61 BPM | TEMPERATURE: 97.2 F | SYSTOLIC BLOOD PRESSURE: 126 MMHG | BODY MASS INDEX: 21.05 KG/M2 | WEIGHT: 114.4 LBS

## 2024-08-13 DIAGNOSIS — Z00.00 ENCOUNTER FOR MEDICARE ANNUAL WELLNESS EXAM: Primary | ICD-10-CM

## 2024-08-13 DIAGNOSIS — M81.0 AGE-RELATED OSTEOPOROSIS WITHOUT CURRENT PATHOLOGICAL FRACTURE: ICD-10-CM

## 2024-08-13 DIAGNOSIS — E78.5 DYSLIPIDEMIA: ICD-10-CM

## 2024-08-13 PROCEDURE — G0439 PPPS, SUBSEQ VISIT: HCPCS

## 2024-08-13 PROCEDURE — 3079F DIAST BP 80-89 MM HG: CPT

## 2024-08-13 PROCEDURE — 3074F SYST BP LT 130 MM HG: CPT

## 2024-08-13 ASSESSMENT — PATIENT HEALTH QUESTIONNAIRE - PHQ9: CLINICAL INTERPRETATION OF PHQ2 SCORE: 0

## 2024-08-13 ASSESSMENT — FIBROSIS 4 INDEX: FIB4 SCORE: 1.51

## 2024-08-13 NOTE — PROGRESS NOTES
Chief Complaint   Patient presents with    Annual Exam     PE    Results         HPI:  Karla Prince is a 78 y.o. here for Medicare Annual Wellness Visit     Patient Active Problem List    Diagnosis Date Noted    Mass of left upper extremity 08/09/2023    Elevated BP without diagnosis of hypertension 02/09/2023    Macrocytosis without anemia 02/09/2023    Dysuria 08/10/2021    Hospital discharge follow-up 08/10/2021    Erosive gastritis 08/04/2021    Hyponatremia 07/30/2021    Vitamin D deficiency 04/03/2019    Osteoporosis of lower leg 04/11/2018    Dyslipidemia 05/31/2016    Impaired fasting blood sugar 05/31/2016    Osteoporosis 03/04/2016       Current Outpatient Medications   Medication Sig Dispense Refill    VITAMIN D PO Take 500 Units by mouth every morning.      Calcium Citrate (CITRACAL PO) Take 2 Tablets by mouth every morning.       No current facility-administered medications for this visit.            Current supplements as per medication list.       Allergies: Patient has no known allergies.    Current social contact/activities: Getting together with friends, spending time with ,  visiting family    She  reports that she has been smoking cigarettes. She has a 13 pack-year smoking history. She has never used smokeless tobacco. She reports that she does not currently use alcohol. She reports that she does not use drugs.  Ready to quit: Not Answered  Counseling given: Not Answered  Tobacco comments: dec from 1/2 ppd to 1/4 ppd since 2/16      DPA/Advanced Directive:  Patient has Advance Directive and Durable Power of  on file.     ROS:    Gait: Uses no assistive device  Ostomy: No  Other tubes: No  Amputations: No  Chronic oxygen use: No  Last eye exam: 2024  Wears hearing aids: No   : Denies any urinary leakage during the last 6 months    Screening:    Depression Screening  Little interest or pleasure in doing things?  0 - not at all  Feeling down, depressed , or hopeless? 0 - not at  all  Patient Health Questionnaire Score: 0     If depressive symptoms identified deferred to follow up visit unless specifically addressed in assessment and plan.    Interpretation of PHQ-9 Total Score   Score Severity   1-4 No Depression   5-9 Mild Depression   10-14 Moderate Depression   15-19 Moderately Severe Depression   20-27 Severe Depression    Screening for Cognitive Impairment  Do you or any of your friends or family members have any concern about your memory?    Three Minute Recall (Bannana, sunrise, chair) 3 /3    Abdirashid clock face with all 12 numbers and set the hands to show 10 minutes after 11.       Cognitive concerns identified deferred for follow up unless specifically addressed in assessment and plan.    Fall Risk Assessment  Has the patient had two or more falls in the last year or any fall with injury in the last year?  No    Safety Assessment  Do you always wear your seatbelt?  Yes   Any changes to home needed to function safely? no    Difficulty hearing.     Patient counseled about all safety risks that were identified.    Functional Assessment ADLs  Are there any barriers preventing you from cooking for yourself or meeting nutritional needs?  no .    Are there any barriers preventing you from driving safely or obtaining transportation?  no .    Are there any barriers preventing you from using a telephone or calling for help?  no      Are there any barriers preventing you from shopping? no  .    Are there any barriers preventing you from taking care of your own finances? no      Are there any barriers preventing you from managing your medications?  no     Are there any barriers preventing you from showering, bathing or dressing yourself? no     Are there any barriers preventing you from doing housework or laundry? no   Are there any barriers preventing you from using the toilet? no  Are you currently engaging in any exercise or physical activity? no  .      Self-Assessment of Health  What is  your perception of your health? Very Good      Do you sleep more than six hours a night? 7 hours in bed with varied sleep       In the past 7 days, how much did pain keep you from doing your normal work? none      Do you spend quality time with family or friends (virtually or in person)? yes      Do you usually eat a heart healthy diet that constists of a variety of fruits, vegetables, whole grains and fiber? yes      Do you eat foods high in fat and/or Fast Food more than three times per week? no      How concerned are you that your medical conditions are not being well managed? Not at all      Are you worried that in the next 2 months, you may not have stable housing that you own, rent, or stay in as part of a household? no        Advance Care Planning  Do you have an Advance Directive, Living Will, Durable Power of , or POLST?  Yes on file                 Health Maintenance Summary            Influenza Vaccine (1) Next due on 9/1/2024      10/02/2023  Outside Immunization: Fluzone High-Dose Quad    09/26/2022  Imm Admin: Influenza Vaccine Adult HD    09/15/2021  Imm Admin: Influenza Vaccine Quad Recombinant    09/23/2020  Imm Admin: Influenza Vaccine Quad Recombinant    10/20/2019  Imm Admin: INFLUENZA TIV (IM)    Only the first 5 history entries have been loaded, but more history exists.              Annual Wellness Visit (Yearly) Next due on 8/13/2025 08/13/2024  Visit Dx: Encounter for Medicare annual wellness exam    04/11/2018  Visit Dx: Medicare annual wellness visit, initial              Bone Density Scan (Every 5 Years) Tentatively due on 5/28/2026 05/28/2021  DS-BONE DENSITY STUDY (DEXA)    04/19/2018  DS-BONE DENSITY STUDY (DEXA)    03/03/2016  DS-BONE DENSITY STUDY (DEXA)              IMM DTaP/Tdap/Td Vaccine (2 - Td or Tdap) Next due on 10/17/2026      10/17/2016  Imm Admin: Tdap Vaccine              Hepatitis C Screening  Tentatively Complete      03/18/2020  Hepatitis C Antibody  component of HEP C VIRUS ANTIBODY              Pneumococcal Vaccine: 65+ Years (Series Information) Completed      03/24/2021  Imm Admin: Pneumococcal polysaccharide vaccine (PPSV-23)    02/23/2016  Imm Admin: Pneumococcal Conjugate Vaccine (Prevnar/PCV-13)    10/10/2011  Imm Admin: Pneumococcal polysaccharide vaccine (PPSV-23)              Zoster (Shingles) Vaccines (Series Information) Completed      05/31/2022  Imm Admin: Zoster Vaccine Recombinant (RZV) (SHINGRIX)    03/31/2022  Imm Admin: Zoster Vaccine Recombinant (RZV) (SHINGRIX)    10/12/2009  Imm Admin: Zoster Vaccine Live (ZVL) (Zostavax) - HISTORICAL DATA              COVID-19 Vaccine (Series Information) Completed      10/02/2023  Imm Admin: Comirnaty (Covid-19 Vaccine, Mrna, [id number] Formula)    06/23/2022  Imm Admin: PFIZER LOCKE CAP SARS-COV-2 VACCINATION (12+)    09/28/2021  Imm Admin: PFIZER PURPLE CAP SARS-COV-2 VACCINATION (12+)    03/10/2021  Imm Admin: PFIZER PURPLE CAP SARS-COV-2 VACCINATION (12+)    02/16/2021  Imm Admin: PFIZER PURPLE CAP SARS-COV-2 VACCINATION (12+)    Only the first 5 history entries have been loaded, but more history exists.              Hepatitis A Vaccine (Hep A) (Series Information) Aged Out      No completion history exists for this topic.              HPV Vaccines (Series Information) Aged Out      No completion history exists for this topic.              Polio Vaccine (Inactivated Polio) (Series Information) Aged Out      No completion history exists for this topic.              Meningococcal Immunization (Series Information) Aged Out      No completion history exists for this topic.              Discontinued - Mammogram  Discontinued        Frequency changed to Never automatically (Topic No Longer Applies)    02/10/2023  MA-SCREENING MAMMO BILAT W/TOMOSYNTHESIS W/CAD    05/28/2021  MA-SCREENING MAMMO BILAT W/CAD    03/15/2019  MA-SCREENING MAMMO BILAT W/TOMOSYNTHESIS W/CAD    11/06/2017  MA-MAMMO SCREENING BILAT  "W/EKTA W/CAD    Only the first 5 history entries have been loaded, but more history exists.              Discontinued - Colorectal Cancer Screening  Discontinued        Frequency changed to Never automatically (Topic No Longer Applies)    05/09/2018  REFERRAL TO GI FOR COLONOSCOPY              Discontinued - Hepatitis B Vaccine (Hep B)  Discontinued      No completion history exists for this topic.                    Patient Care Team:  Stefan Cox D.N.P. as PCP - General (Nurse Practitioner Family)  John Richards M.D. as Consulting Physician (Ophthalmology)  [facility] (Inactive) as Consulting Physician (Gastroenterology)  [facility]         Social History     Tobacco Use    Smoking status: Every Day     Current packs/day: 0.25     Average packs/day: 0.3 packs/day for 52.0 years (13.0 ttl pk-yrs)     Types: Cigarettes    Smokeless tobacco: Never    Tobacco comments:     dec from 1/2 ppd to 1/4 ppd since 2/16   Vaping Use    Vaping status: Never Used   Substance Use Topics    Alcohol use: Not Currently     Alcohol/week: 0.0 oz     Comment: glass of wine with dinner    Drug use: No     Family History   Problem Relation Age of Onset    Dementia Mother     Diabetes Father     Hyperlipidemia Sister     Hyperlipidemia Brother      She  has a past medical history of Dyslipidemia, Impaired fasting blood sugar, Osteoporosis (3/16), and Osteoporosis.   Past Surgical History:   Procedure Laterality Date    SC UPPER GI ENDOSCOPY,DIAGNOSIS N/A 8/3/2021    Procedure: GASTROSCOPY;  Surgeon: Darrell Iverson M.D.;  Location: SURGERY [facility];  Service: Gastroenterology    COLONOSCOPY  05/09/2018    int hemorrhoids    HYSTERECTOMY, TOTAL ABDOMINAL      w/ BSO due to endometriosis       Exam:   /80 (BP Location: Left arm, Patient Position: Sitting, BP Cuff Size: Adult)   Pulse 61   Temp 36.2 °C (97.2 °F) (Temporal)   Ht 1.575 m (5' 2\")   Wt 51.9 " kg (114 lb 6.4 oz)   SpO2 99%  Body mass index is 20.92 kg/m².    Hearing good.    Dentition good  Alert, oriented in no acute distress.  Eye contact is good, speech goal directed, affect calm    Assessment and Plan. The following treatment and monitoring plan is recommended:    1. Encounter for Medicare annual wellness exam  Comp Metabolic Panel    Lipid Profile      2. Dyslipidemia  Comp Metabolic Panel    Lipid Profile      3. Age-related osteoporosis without current pathological fracture            Problem List Items Addressed This Visit       Osteoporosis     Chronic condition uncontrolled  Declines medications at this time  -Recommend calcium citrate 1200 mg daily along with vitamin D3 2000 IUs daily. Declines Biphosponates   -Recommend weightbearing exercise  -Repeat DEXA scan at some point in the next year  -Fall precautions discussed         Dyslipidemia     Chronic condition, stable  The 10-year ASCVD risk score (Marciano DK, et al., 2019) is: 23.9%  -Declines statins  -Will continue to eat healthy           Relevant Orders    Comp Metabolic Panel    Lipid Profile     Other Visit Diagnoses       Encounter for Medicare annual wellness exam    -  Primary    Relevant Orders    Comp Metabolic Panel    Lipid Profile            Services suggested: No services needed at this time  Health Care Screening: Age-appropriate preventive services recommended by USPTF and ACIP covered by Medicare were discussed today. Services ordered if indicated and agreed upon by the patient.  Referrals offered: Community-based lifestyle interventions to reduce health risks and promote self-management and wellness, fall prevention, nutrition, physical activity, tobacco-use cessation, weight loss, and mental health services as per orders if indicated.    Anticipatory Guidance Discussion today about general wellness and lifestyle habits:    Prevent falls and reduce trip hazards; Cautioned about securing or removing rugs.  Have a working  fire alarm and carbon monoxide detector;   Engage in regular physical activity and social activities     Follow-up: Return in about 1 year (around 8/13/2025), or if symptoms worsen or fail to improve.

## 2024-08-13 NOTE — ASSESSMENT & PLAN NOTE
Chronic condition, stable  The 10-year ASCVD risk score (Marciano DK, et al., 2019) is: 23.9%  -Declines statins  -Will continue to eat healthy

## 2024-08-13 NOTE — ASSESSMENT & PLAN NOTE
Chronic condition uncontrolled  Declines medications at this time  -Recommend calcium citrate 1200 mg daily along with vitamin D3 2000 IUs daily. Declines Biphosponates   -Recommend weightbearing exercise  -Repeat DEXA scan at some point in the next year  -Fall precautions discussed

## 2024-09-04 ENCOUNTER — PATIENT MESSAGE (OUTPATIENT)
Dept: HEALTH INFORMATION MANAGEMENT | Facility: OTHER | Age: 79
End: 2024-09-04

## 2025-07-30 ENCOUNTER — APPOINTMENT (OUTPATIENT)
Dept: LAB | Facility: MEDICAL CENTER | Age: 80
End: 2025-07-30
Payer: MEDICARE

## 2025-07-30 DIAGNOSIS — Z00.00 ENCOUNTER FOR MEDICARE ANNUAL WELLNESS EXAM: ICD-10-CM

## 2025-07-30 DIAGNOSIS — E78.5 DYSLIPIDEMIA: ICD-10-CM

## 2025-07-30 LAB
ALBUMIN SERPL BCP-MCNC: 4 G/DL (ref 3.2–4.9)
ALBUMIN/GLOB SERPL: 1.4 G/DL
ALP SERPL-CCNC: 76 U/L (ref 30–99)
ALT SERPL-CCNC: 22 U/L (ref 2–50)
ANION GAP SERPL CALC-SCNC: 9 MMOL/L (ref 7–16)
AST SERPL-CCNC: 23 U/L (ref 12–45)
BILIRUB SERPL-MCNC: 0.3 MG/DL (ref 0.1–1.5)
BUN SERPL-MCNC: 16 MG/DL (ref 8–22)
CALCIUM ALBUM COR SERPL-MCNC: 9.4 MG/DL (ref 8.5–10.5)
CALCIUM SERPL-MCNC: 9.4 MG/DL (ref 8.5–10.5)
CHLORIDE SERPL-SCNC: 102 MMOL/L (ref 96–112)
CHOLEST SERPL-MCNC: 205 MG/DL (ref 100–199)
CO2 SERPL-SCNC: 25 MMOL/L (ref 20–33)
CREAT SERPL-MCNC: 0.77 MG/DL (ref 0.5–1.4)
GFR SERPLBLD CREATININE-BSD FMLA CKD-EPI: 79 ML/MIN/1.73 M 2
GLOBULIN SER CALC-MCNC: 2.8 G/DL (ref 1.9–3.5)
GLUCOSE SERPL-MCNC: 81 MG/DL (ref 65–99)
HDLC SERPL-MCNC: 53 MG/DL
LDLC SERPL CALC-MCNC: 101 MG/DL
POTASSIUM SERPL-SCNC: 4.6 MMOL/L (ref 3.6–5.5)
PROT SERPL-MCNC: 6.8 G/DL (ref 6–8.2)
SODIUM SERPL-SCNC: 136 MMOL/L (ref 135–145)
TRIGL SERPL-MCNC: 253 MG/DL (ref 0–149)

## 2025-07-30 PROCEDURE — 80053 COMPREHEN METABOLIC PANEL: CPT

## 2025-07-30 PROCEDURE — 36415 COLL VENOUS BLD VENIPUNCTURE: CPT

## 2025-07-30 PROCEDURE — 80061 LIPID PANEL: CPT

## 2025-07-31 ENCOUNTER — APPOINTMENT (OUTPATIENT)
Dept: RADIOLOGY | Facility: MEDICAL CENTER | Age: 80
End: 2025-07-31
Attending: STUDENT IN AN ORGANIZED HEALTH CARE EDUCATION/TRAINING PROGRAM
Payer: MEDICARE

## 2025-07-31 ENCOUNTER — HOSPITAL ENCOUNTER (EMERGENCY)
Facility: MEDICAL CENTER | Age: 80
End: 2025-07-31
Attending: STUDENT IN AN ORGANIZED HEALTH CARE EDUCATION/TRAINING PROGRAM
Payer: MEDICARE

## 2025-07-31 VITALS
HEART RATE: 77 BPM | BODY MASS INDEX: 20.85 KG/M2 | DIASTOLIC BLOOD PRESSURE: 93 MMHG | RESPIRATION RATE: 16 BRPM | WEIGHT: 114 LBS | SYSTOLIC BLOOD PRESSURE: 181 MMHG | OXYGEN SATURATION: 93 % | TEMPERATURE: 97.5 F

## 2025-07-31 DIAGNOSIS — N30.00 ACUTE CYSTITIS WITHOUT HEMATURIA: ICD-10-CM

## 2025-07-31 DIAGNOSIS — M80.08XA CHRONIC VERTEBRAL FRACTURE DUE TO OSTEOPOROSIS (HCC): ICD-10-CM

## 2025-07-31 DIAGNOSIS — S09.90XA CLOSED HEAD INJURY, INITIAL ENCOUNTER: Primary | ICD-10-CM

## 2025-07-31 DIAGNOSIS — S41.111A SKIN TEAR OF UPPER ARM WITHOUT COMPLICATION, RIGHT, INITIAL ENCOUNTER: ICD-10-CM

## 2025-07-31 DIAGNOSIS — S00.03XA HEMATOMA OF SCALP, INITIAL ENCOUNTER: ICD-10-CM

## 2025-07-31 LAB
APPEARANCE UR: CLEAR
BACTERIA #/AREA URNS HPF: ABNORMAL /HPF
BILIRUB UR QL STRIP.AUTO: NEGATIVE
CASTS URNS QL MICRO: ABNORMAL /LPF (ref 0–2)
COLOR UR: YELLOW
EKG IMPRESSION: NORMAL
EPITHELIAL CELLS 1715: ABNORMAL /HPF (ref 0–5)
GLUCOSE UR STRIP.AUTO-MCNC: NEGATIVE MG/DL
KETONES UR STRIP.AUTO-MCNC: NEGATIVE MG/DL
LEUKOCYTE ESTERASE UR QL STRIP.AUTO: ABNORMAL
MICRO URNS: ABNORMAL
NITRITE UR QL STRIP.AUTO: POSITIVE
PH UR STRIP.AUTO: 7.5 [PH] (ref 5–8)
PROT UR QL STRIP: NEGATIVE MG/DL
RBC # URNS HPF: ABNORMAL /HPF (ref 0–2)
RBC UR QL AUTO: NEGATIVE
SP GR UR STRIP.AUTO: 1.01
UROBILINOGEN UR STRIP.AUTO-MCNC: 0.2 EU/DL
WBC #/AREA URNS HPF: ABNORMAL /HPF

## 2025-07-31 PROCEDURE — 72125 CT NECK SPINE W/O DYE: CPT

## 2025-07-31 PROCEDURE — 73080 X-RAY EXAM OF ELBOW: CPT | Mod: RT

## 2025-07-31 PROCEDURE — 72131 CT LUMBAR SPINE W/O DYE: CPT

## 2025-07-31 PROCEDURE — 72128 CT CHEST SPINE W/O DYE: CPT

## 2025-07-31 PROCEDURE — 73590 X-RAY EXAM OF LOWER LEG: CPT | Mod: RT

## 2025-07-31 PROCEDURE — 81001 URINALYSIS AUTO W/SCOPE: CPT

## 2025-07-31 PROCEDURE — 99284 EMERGENCY DEPT VISIT MOD MDM: CPT

## 2025-07-31 PROCEDURE — 700111 HCHG RX REV CODE 636 W/ 250 OVERRIDE (IP): Performed by: STUDENT IN AN ORGANIZED HEALTH CARE EDUCATION/TRAINING PROGRAM

## 2025-07-31 PROCEDURE — 90715 TDAP VACCINE 7 YRS/> IM: CPT | Performed by: STUDENT IN AN ORGANIZED HEALTH CARE EDUCATION/TRAINING PROGRAM

## 2025-07-31 PROCEDURE — 90471 IMMUNIZATION ADMIN: CPT

## 2025-07-31 PROCEDURE — 70450 CT HEAD/BRAIN W/O DYE: CPT

## 2025-07-31 PROCEDURE — 93005 ELECTROCARDIOGRAM TRACING: CPT | Mod: TC | Performed by: STUDENT IN AN ORGANIZED HEALTH CARE EDUCATION/TRAINING PROGRAM

## 2025-07-31 RX ORDER — NITROFURANTOIN 25; 75 MG/1; MG/1
100 CAPSULE ORAL 2 TIMES DAILY
Qty: 10 CAPSULE | Refills: 0 | Status: ACTIVE | OUTPATIENT
Start: 2025-07-31 | End: 2025-08-05

## 2025-07-31 RX ADMIN — CLOSTRIDIUM TETANI TOXOID ANTIGEN (FORMALDEHYDE INACTIVATED), CORYNEBACTERIUM DIPHTHERIAE TOXOID ANTIGEN (FORMALDEHYDE INACTIVATED), BORDETELLA PERTUSSIS TOXOID ANTIGEN (GLUTARALDEHYDE INACTIVATED), BORDETELLA PERTUSSIS FILAMENTOUS HEMAGGLUTININ ANTIGEN (FORMALDEHYDE INACTIVATED), BORDETELLA PERTUSSIS PERTACTIN ANTIGEN, AND BORDETELLA PERTUSSIS FIMBRIAE 2/3 ANTIGEN 0.5 ML: 5; 2; 2.5; 5; 3; 5 INJECTION, SUSPENSION INTRAMUSCULAR at 20:18

## 2025-07-31 ASSESSMENT — PAIN DESCRIPTION - PAIN TYPE: TYPE: ACUTE PAIN

## 2025-07-31 ASSESSMENT — FIBROSIS 4 INDEX: FIB4 SCORE: 1.76

## 2025-08-13 ENCOUNTER — OFFICE VISIT (OUTPATIENT)
Dept: MEDICAL GROUP | Facility: PHYSICIAN GROUP | Age: 80
End: 2025-08-13
Payer: MEDICARE

## 2025-08-13 VITALS
SYSTOLIC BLOOD PRESSURE: 120 MMHG | TEMPERATURE: 97.6 F | HEART RATE: 60 BPM | RESPIRATION RATE: 17 BRPM | BODY MASS INDEX: 20.72 KG/M2 | OXYGEN SATURATION: 98 % | HEIGHT: 62 IN | DIASTOLIC BLOOD PRESSURE: 80 MMHG | WEIGHT: 112.6 LBS

## 2025-08-13 DIAGNOSIS — E55.9 VITAMIN D DEFICIENCY: ICD-10-CM

## 2025-08-13 DIAGNOSIS — Z91.81 RISK FOR FALLS: ICD-10-CM

## 2025-08-13 DIAGNOSIS — Z00.00 ENCOUNTER FOR MEDICARE ANNUAL WELLNESS EXAM: ICD-10-CM

## 2025-08-13 DIAGNOSIS — M81.8 OTHER OSTEOPOROSIS WITHOUT CURRENT PATHOLOGICAL FRACTURE: ICD-10-CM

## 2025-08-13 DIAGNOSIS — D75.89 MACROCYTOSIS WITHOUT ANEMIA: ICD-10-CM

## 2025-08-13 DIAGNOSIS — E78.5 DYSLIPIDEMIA: Primary | ICD-10-CM

## 2025-08-13 PROCEDURE — 3074F SYST BP LT 130 MM HG: CPT

## 2025-08-13 PROCEDURE — 1125F AMNT PAIN NOTED PAIN PRSNT: CPT

## 2025-08-13 PROCEDURE — 3079F DIAST BP 80-89 MM HG: CPT

## 2025-08-13 PROCEDURE — G0439 PPPS, SUBSEQ VISIT: HCPCS

## 2025-08-13 ASSESSMENT — PAIN SCALES - GENERAL: PAINLEVEL_OUTOF10: 4=SLIGHT-MODERATE PAIN

## 2025-08-13 ASSESSMENT — ENCOUNTER SYMPTOMS: GENERAL WELL-BEING: GOOD

## 2025-08-13 ASSESSMENT — ACTIVITIES OF DAILY LIVING (ADL): BATHING_REQUIRES_ASSISTANCE: 0

## 2025-08-13 ASSESSMENT — PATIENT HEALTH QUESTIONNAIRE - PHQ9: CLINICAL INTERPRETATION OF PHQ2 SCORE: 0

## (undated) DEVICE — SENSOR SPO2 ADULT LNCS ADTX (20/BX) ORDER ITEM #19593

## (undated) DEVICE — MASK ANESTHESIA ADULT  - (100/CA)

## (undated) DEVICE — MASK WITH FACE SHIELD (25/BX 4BX/CA)

## (undated) DEVICE — LACTATED RINGERS INJ 1000 ML - (14EA/CA 60CA/PF)

## (undated) DEVICE — SYRINGE SAFETY 10 ML 18 GA X 1 1/2 BLUNT LL (100/BX 4BX/CA)

## (undated) DEVICE — TOWEL STOP TIMEOUT SAFETY FLAG (40EA/CA)

## (undated) DEVICE — SYRINGE 12 CC LUER TIP - (80/BX) OBSOLETE ITEM

## (undated) DEVICE — WATER IRRIGATION STERILE 1000ML (12EA/CA)

## (undated) DEVICE — KIT CUSTOM PROCEDURE SINGLE FOR ENDO  (15/CA)

## (undated) DEVICE — GLOVE, LITE (PAIR)

## (undated) DEVICE — TUBING CLEARLINK DUO-VENT - C-FLO (48EA/CA)

## (undated) DEVICE — FORCEP RADIAL JAW 4 STANDARD CAPACITY W/NEEDLE 240CM (40EA/BX)

## (undated) DEVICE — SYRINGE DISP. 60 CC LL - (30/BX, 12BX/CA)**WHEN THESE ARE GONE ORDER #500206**

## (undated) DEVICE — KIT ANESTHESIA W/CIRCUIT & 3/LT BAG W/FILTER (20EA/CA)

## (undated) DEVICE — ELECTRODE DUAL RETURN W/ CORD - (50/PK)

## (undated) DEVICE — GOWN SURGEONS LARGE - (32/CA)

## (undated) DEVICE — SYRINGE SAFETY 3 ML 18 GA X 1 1/2 BLUNT LL (100/BX 8BX/CA)

## (undated) DEVICE — BLOCK BITE ENDOSCOPIC 2809 - (100/BX) INTERMEDIATE

## (undated) DEVICE — SPONGE GAUZE NON-STERILE 4X4 - (2000/CA 10PK/CA)

## (undated) DEVICE — SYRINGE SAFETY 5 ML 18 GA X 1-1/2 BLUNT LL (100/BX 4BX/CA)

## (undated) DEVICE — KIT  I.V. START (100EA/CA)

## (undated) DEVICE — TUBE SUCTION YANKAUER  1/4 X 6FT (20EA/CA)"

## (undated) DEVICE — NEPTUNE 4 PORT MANIFOLD - (20/PK)